# Patient Record
Sex: MALE | Race: BLACK OR AFRICAN AMERICAN | Employment: FULL TIME | URBAN - METROPOLITAN AREA
[De-identification: names, ages, dates, MRNs, and addresses within clinical notes are randomized per-mention and may not be internally consistent; named-entity substitution may affect disease eponyms.]

---

## 2017-01-27 ENCOUNTER — OFFICE VISIT (OUTPATIENT)
Dept: PRIMARY CARE CLINIC | Age: 57
End: 2017-01-27

## 2017-01-27 VITALS
WEIGHT: 263.8 LBS | TEMPERATURE: 97.9 F | BODY MASS INDEX: 31.28 KG/M2 | HEART RATE: 65 BPM | OXYGEN SATURATION: 98 % | SYSTOLIC BLOOD PRESSURE: 140 MMHG | DIASTOLIC BLOOD PRESSURE: 82 MMHG

## 2017-01-27 DIAGNOSIS — E78.00 PURE HYPERCHOLESTEROLEMIA: Primary | ICD-10-CM

## 2017-01-27 DIAGNOSIS — M1A.0790 CHRONIC IDIOPATHIC GOUT INVOLVING TOE, UNSPECIFIED LATERALITY: ICD-10-CM

## 2017-01-27 DIAGNOSIS — Z72.0 TOBACCO ABUSE: ICD-10-CM

## 2017-01-27 DIAGNOSIS — I10 ESSENTIAL HYPERTENSION: ICD-10-CM

## 2017-01-27 DIAGNOSIS — E78.00 PURE HYPERCHOLESTEROLEMIA: ICD-10-CM

## 2017-01-27 DIAGNOSIS — N52.01 ERECTILE DYSFUNCTION DUE TO ARTERIAL INSUFFICIENCY: ICD-10-CM

## 2017-01-27 LAB
ANION GAP SERPL CALCULATED.3IONS-SCNC: 17 MMOL/L (ref 3–16)
AST SERPL-CCNC: 27 U/L (ref 15–37)
BUN BLDV-MCNC: 18 MG/DL (ref 7–20)
CALCIUM SERPL-MCNC: 10.1 MG/DL (ref 8.3–10.6)
CHLORIDE BLD-SCNC: 100 MMOL/L (ref 99–110)
CHOLESTEROL, TOTAL: 242 MG/DL (ref 0–199)
CO2: 26 MMOL/L (ref 21–32)
CREAT SERPL-MCNC: 0.8 MG/DL (ref 0.9–1.3)
GFR AFRICAN AMERICAN: >60
GFR NON-AFRICAN AMERICAN: >60
GLUCOSE BLD-MCNC: 80 MG/DL (ref 70–99)
HDLC SERPL-MCNC: 95 MG/DL (ref 40–60)
LDL CHOLESTEROL CALCULATED: 126 MG/DL
POTASSIUM SERPL-SCNC: 4.5 MMOL/L (ref 3.5–5.1)
SODIUM BLD-SCNC: 143 MMOL/L (ref 136–145)
TRIGL SERPL-MCNC: 104 MG/DL (ref 0–150)
URIC ACID, SERUM: 6.9 MG/DL (ref 3.5–7.2)
VLDLC SERPL CALC-MCNC: 21 MG/DL

## 2017-01-27 PROCEDURE — 3017F COLORECTAL CA SCREEN DOC REV: CPT | Performed by: FAMILY MEDICINE

## 2017-01-27 PROCEDURE — 4004F PT TOBACCO SCREEN RCVD TLK: CPT | Performed by: FAMILY MEDICINE

## 2017-01-27 PROCEDURE — G8427 DOCREV CUR MEDS BY ELIG CLIN: HCPCS | Performed by: FAMILY MEDICINE

## 2017-01-27 PROCEDURE — G8419 CALC BMI OUT NRM PARAM NOF/U: HCPCS | Performed by: FAMILY MEDICINE

## 2017-01-27 PROCEDURE — 99214 OFFICE O/P EST MOD 30 MIN: CPT | Performed by: FAMILY MEDICINE

## 2017-01-27 PROCEDURE — G8484 FLU IMMUNIZE NO ADMIN: HCPCS | Performed by: FAMILY MEDICINE

## 2017-01-27 RX ORDER — BISOPROLOL FUMARATE AND HYDROCHLOROTHIAZIDE 10; 6.25 MG/1; MG/1
TABLET ORAL
Qty: 90 TABLET | Refills: 5 | Status: SHIPPED | OUTPATIENT
Start: 2017-01-27 | End: 2017-03-01 | Stop reason: SDUPTHER

## 2017-01-27 RX ORDER — TADALAFIL 2.5 MG/1
TABLET ORAL
Qty: 30 TABLET | Refills: 5 | Status: SHIPPED | OUTPATIENT
Start: 2017-01-27 | End: 2018-04-02 | Stop reason: SDUPTHER

## 2017-01-27 RX ORDER — CITALOPRAM 20 MG/1
20 TABLET ORAL
COMMUNITY
End: 2018-04-02

## 2017-01-27 RX ORDER — ALLOPURINOL 100 MG/1
TABLET ORAL
Qty: 180 TABLET | Refills: 1 | Status: SHIPPED | OUTPATIENT
Start: 2017-01-27 | End: 2018-04-02 | Stop reason: SDUPTHER

## 2017-01-27 RX ORDER — SIMVASTATIN 10 MG
10 TABLET ORAL
COMMUNITY
Start: 2015-06-16 | End: 2017-03-01 | Stop reason: CLARIF

## 2017-01-27 ASSESSMENT — ENCOUNTER SYMPTOMS
SHORTNESS OF BREATH: 0
BLURRED VISION: 0
CHOKING: 0
PHOTOPHOBIA: 0
SORE THROAT: 0
BACK PAIN: 0
ABDOMINAL PAIN: 0
ORTHOPNEA: 0
FACIAL SWELLING: 0
TROUBLE SWALLOWING: 0
APNEA: 0
COUGH: 0
ANAL BLEEDING: 0
COLOR CHANGE: 0
VOICE CHANGE: 0
EYE DISCHARGE: 0
NAUSEA: 0
EYE ITCHING: 0
RECTAL PAIN: 0
EYE REDNESS: 0
WHEEZING: 0
VOMITING: 0
BLOOD IN STOOL: 0
CONSTIPATION: 0
SINUS PRESSURE: 0
EYE PAIN: 0
CHEST TIGHTNESS: 0

## 2017-01-27 ASSESSMENT — PATIENT HEALTH QUESTIONNAIRE - PHQ9
1. LITTLE INTEREST OR PLEASURE IN DOING THINGS: 0
2. FEELING DOWN, DEPRESSED OR HOPELESS: 0
SUM OF ALL RESPONSES TO PHQ QUESTIONS 1-9: 0
SUM OF ALL RESPONSES TO PHQ9 QUESTIONS 1 & 2: 0

## 2017-02-01 ENCOUNTER — TELEPHONE (OUTPATIENT)
Dept: PRIMARY CARE CLINIC | Age: 57
End: 2017-02-01

## 2017-02-03 ENCOUNTER — OFFICE VISIT (OUTPATIENT)
Dept: FAMILY MEDICINE CLINIC | Age: 57
End: 2017-02-03

## 2017-02-03 VITALS
HEIGHT: 77 IN | WEIGHT: 264 LBS | SYSTOLIC BLOOD PRESSURE: 132 MMHG | BODY MASS INDEX: 31.17 KG/M2 | DIASTOLIC BLOOD PRESSURE: 86 MMHG | HEART RATE: 68 BPM

## 2017-02-03 DIAGNOSIS — M25.562 ACUTE PAIN OF BOTH KNEES: Primary | ICD-10-CM

## 2017-02-03 DIAGNOSIS — M25.561 ACUTE PAIN OF BOTH KNEES: Primary | ICD-10-CM

## 2017-02-03 PROCEDURE — 99213 OFFICE O/P EST LOW 20 MIN: CPT | Performed by: NURSE PRACTITIONER

## 2017-02-03 PROCEDURE — G8427 DOCREV CUR MEDS BY ELIG CLIN: HCPCS | Performed by: NURSE PRACTITIONER

## 2017-02-03 PROCEDURE — 4004F PT TOBACCO SCREEN RCVD TLK: CPT | Performed by: NURSE PRACTITIONER

## 2017-02-03 PROCEDURE — 3017F COLORECTAL CA SCREEN DOC REV: CPT | Performed by: NURSE PRACTITIONER

## 2017-02-03 PROCEDURE — G8484 FLU IMMUNIZE NO ADMIN: HCPCS | Performed by: NURSE PRACTITIONER

## 2017-02-03 PROCEDURE — G8419 CALC BMI OUT NRM PARAM NOF/U: HCPCS | Performed by: NURSE PRACTITIONER

## 2017-02-03 RX ORDER — TRAMADOL HYDROCHLORIDE 50 MG/1
50 TABLET ORAL 2 TIMES DAILY PRN
Qty: 20 TABLET | Refills: 0 | Status: SHIPPED | OUTPATIENT
Start: 2017-02-03 | End: 2017-02-13

## 2017-02-03 RX ORDER — PREDNISONE 10 MG/1
TABLET ORAL
Qty: 20 TABLET | Refills: 0 | Status: SHIPPED | OUTPATIENT
Start: 2017-02-03 | End: 2018-04-02

## 2017-02-03 ASSESSMENT — ENCOUNTER SYMPTOMS
CONSTIPATION: 0
DIARRHEA: 0
VOMITING: 0
BACK PAIN: 0
NAUSEA: 0
COUGH: 0
ABDOMINAL PAIN: 0
CHEST TIGHTNESS: 0
SHORTNESS OF BREATH: 0
WHEEZING: 0
VOICE CHANGE: 0

## 2017-02-13 ENCOUNTER — OFFICE VISIT (OUTPATIENT)
Dept: ORTHOPEDIC SURGERY | Age: 57
End: 2017-02-13

## 2017-02-13 ENCOUNTER — TELEPHONE (OUTPATIENT)
Dept: ORTHOPEDIC SURGERY | Age: 57
End: 2017-02-13

## 2017-02-13 VITALS
SYSTOLIC BLOOD PRESSURE: 132 MMHG | WEIGHT: 264 LBS | BODY MASS INDEX: 31.17 KG/M2 | HEART RATE: 78 BPM | TEMPERATURE: 97.8 F | DIASTOLIC BLOOD PRESSURE: 89 MMHG | HEIGHT: 77 IN

## 2017-02-13 DIAGNOSIS — S83.92XA SPRAIN OF LEFT KNEE, UNSPECIFIED LIGAMENT, INITIAL ENCOUNTER: ICD-10-CM

## 2017-02-13 DIAGNOSIS — S83.91XA SPRAIN OF RIGHT KNEE, UNSPECIFIED LIGAMENT, INITIAL ENCOUNTER: ICD-10-CM

## 2017-02-13 DIAGNOSIS — S80.01XA CONTUSION OF RIGHT KNEE, INITIAL ENCOUNTER: ICD-10-CM

## 2017-02-13 DIAGNOSIS — M17.0 PRIMARY OSTEOARTHRITIS OF BOTH KNEES: Primary | ICD-10-CM

## 2017-02-13 DIAGNOSIS — S80.02XA CONTUSION OF LEFT KNEE, INITIAL ENCOUNTER: Primary | ICD-10-CM

## 2017-02-13 PROCEDURE — G8419 CALC BMI OUT NRM PARAM NOF/U: HCPCS | Performed by: ORTHOPAEDIC SURGERY

## 2017-02-13 PROCEDURE — G8484 FLU IMMUNIZE NO ADMIN: HCPCS | Performed by: ORTHOPAEDIC SURGERY

## 2017-02-13 PROCEDURE — 3017F COLORECTAL CA SCREEN DOC REV: CPT | Performed by: ORTHOPAEDIC SURGERY

## 2017-02-13 PROCEDURE — 20610 DRAIN/INJ JOINT/BURSA W/O US: CPT | Performed by: ORTHOPAEDIC SURGERY

## 2017-02-13 PROCEDURE — 4004F PT TOBACCO SCREEN RCVD TLK: CPT | Performed by: ORTHOPAEDIC SURGERY

## 2017-02-13 PROCEDURE — G8427 DOCREV CUR MEDS BY ELIG CLIN: HCPCS | Performed by: ORTHOPAEDIC SURGERY

## 2017-02-13 PROCEDURE — 99214 OFFICE O/P EST MOD 30 MIN: CPT | Performed by: ORTHOPAEDIC SURGERY

## 2017-02-15 ENCOUNTER — TELEPHONE (OUTPATIENT)
Dept: ORTHOPEDIC SURGERY | Age: 57
End: 2017-02-15

## 2017-02-15 DIAGNOSIS — M25.561 RIGHT KNEE PAIN, UNSPECIFIED CHRONICITY: Primary | ICD-10-CM

## 2017-02-21 ENCOUNTER — HOSPITAL ENCOUNTER (OUTPATIENT)
Dept: OTHER | Age: 57
Discharge: HOME | End: 2017-02-28
Attending: ORTHOPAEDIC SURGERY | Admitting: ORTHOPAEDIC SURGERY

## 2017-02-21 ASSESSMENT — PAIN DESCRIPTION - LOCATION: LOCATION: KNEE

## 2017-02-21 ASSESSMENT — PAIN DESCRIPTION - ORIENTATION: ORIENTATION: RIGHT;LEFT

## 2017-02-21 ASSESSMENT — PAIN DESCRIPTION - PROGRESSION: CLINICAL_PROGRESSION: NOT CHANGED

## 2017-02-21 ASSESSMENT — PAIN DESCRIPTION - DESCRIPTORS: DESCRIPTORS: SHOOTING;SHARP;CONSTANT

## 2017-02-22 ENCOUNTER — HOSPITAL ENCOUNTER (OUTPATIENT)
Dept: PHYSICAL THERAPY | Age: 57
Discharge: HOME | End: 2017-02-22
Admitting: ORTHOPAEDIC SURGERY

## 2017-02-24 ENCOUNTER — HOSPITAL ENCOUNTER (OUTPATIENT)
Dept: MRI IMAGING | Age: 57
Discharge: HOME | End: 2017-02-24
Attending: ORTHOPAEDIC SURGERY | Admitting: ORTHOPAEDIC SURGERY

## 2017-02-24 DIAGNOSIS — M25.561 RIGHT KNEE PAIN, UNSPECIFIED CHRONICITY: ICD-10-CM

## 2017-02-24 DIAGNOSIS — M25.561 PAIN IN RIGHT KNEE: ICD-10-CM

## 2017-02-27 ENCOUNTER — TELEPHONE (OUTPATIENT)
Dept: ORTHOPEDIC SURGERY | Age: 57
End: 2017-02-27

## 2017-02-28 ENCOUNTER — HOSPITAL ENCOUNTER (OUTPATIENT)
Dept: PHYSICAL THERAPY | Age: 57
Discharge: HOME | End: 2017-02-28
Admitting: ORTHOPAEDIC SURGERY

## 2017-03-01 ENCOUNTER — OFFICE VISIT (OUTPATIENT)
Dept: PRIMARY CARE CLINIC | Age: 57
End: 2017-03-01

## 2017-03-01 VITALS
SYSTOLIC BLOOD PRESSURE: 119 MMHG | OXYGEN SATURATION: 99 % | HEART RATE: 69 BPM | BODY MASS INDEX: 31.07 KG/M2 | TEMPERATURE: 98.4 F | DIASTOLIC BLOOD PRESSURE: 78 MMHG | RESPIRATION RATE: 18 BRPM | WEIGHT: 262 LBS

## 2017-03-01 DIAGNOSIS — Z23 FLU VACCINE NEED: Primary | ICD-10-CM

## 2017-03-01 DIAGNOSIS — Z23 NEED FOR 23-POLYVALENT PNEUMOCOCCAL POLYSACCHARIDE VACCINE: ICD-10-CM

## 2017-03-01 DIAGNOSIS — I10 ESSENTIAL HYPERTENSION: ICD-10-CM

## 2017-03-01 DIAGNOSIS — Z23 NEED FOR DIPHTHERIA-TETANUS-PERTUSSIS (TDAP) VACCINE: ICD-10-CM

## 2017-03-01 PROCEDURE — 90732 PPSV23 VACC 2 YRS+ SUBQ/IM: CPT | Performed by: INTERNAL MEDICINE

## 2017-03-01 PROCEDURE — 4004F PT TOBACCO SCREEN RCVD TLK: CPT | Performed by: INTERNAL MEDICINE

## 2017-03-01 PROCEDURE — G8484 FLU IMMUNIZE NO ADMIN: HCPCS | Performed by: INTERNAL MEDICINE

## 2017-03-01 PROCEDURE — G8427 DOCREV CUR MEDS BY ELIG CLIN: HCPCS | Performed by: INTERNAL MEDICINE

## 2017-03-01 PROCEDURE — G8417 CALC BMI ABV UP PARAM F/U: HCPCS | Performed by: INTERNAL MEDICINE

## 2017-03-01 PROCEDURE — 3017F COLORECTAL CA SCREEN DOC REV: CPT | Performed by: INTERNAL MEDICINE

## 2017-03-01 PROCEDURE — 99213 OFFICE O/P EST LOW 20 MIN: CPT | Performed by: INTERNAL MEDICINE

## 2017-03-01 PROCEDURE — G0009 ADMIN PNEUMOCOCCAL VACCINE: HCPCS | Performed by: INTERNAL MEDICINE

## 2017-03-01 RX ORDER — BISOPROLOL FUMARATE AND HYDROCHLOROTHIAZIDE 10; 6.25 MG/1; MG/1
TABLET ORAL
Qty: 180 TABLET | Refills: 2 | Status: SHIPPED | OUTPATIENT
Start: 2017-03-01 | End: 2018-01-17 | Stop reason: SDUPTHER

## 2017-03-01 RX ORDER — EZETIMIBE 10 MG/1
10 TABLET ORAL DAILY
Qty: 90 TABLET | Refills: 0 | Status: SHIPPED | OUTPATIENT
Start: 2017-03-01 | End: 2017-06-07 | Stop reason: SDUPTHER

## 2017-03-01 RX ORDER — KETOPROFEN 50 MG/1
50 CAPSULE ORAL 2 TIMES DAILY
COMMUNITY
Start: 2017-02-14 | End: 2018-04-02 | Stop reason: SDUPTHER

## 2017-03-01 ASSESSMENT — ENCOUNTER SYMPTOMS
ORTHOPNEA: 0
EYE REDNESS: 0
GASTROINTESTINAL NEGATIVE: 1
EYE ITCHING: 0
EYE DISCHARGE: 0
RESPIRATORY NEGATIVE: 1
EYE PAIN: 0
PHOTOPHOBIA: 0
BLURRED VISION: 0
SHORTNESS OF BREATH: 0

## 2017-03-02 ENCOUNTER — OFFICE VISIT (OUTPATIENT)
Dept: ORTHOPEDIC SURGERY | Age: 57
End: 2017-03-02

## 2017-03-02 ENCOUNTER — TELEPHONE (OUTPATIENT)
Dept: ORTHOPEDIC SURGERY | Age: 57
End: 2017-03-02

## 2017-03-02 ENCOUNTER — HOSPITAL ENCOUNTER (OUTPATIENT)
Dept: PHYSICAL THERAPY | Age: 57
Discharge: HOME | End: 2017-03-03
Admitting: ORTHOPAEDIC SURGERY

## 2017-03-02 VITALS
DIASTOLIC BLOOD PRESSURE: 82 MMHG | BODY MASS INDEX: 30.94 KG/M2 | TEMPERATURE: 97 F | RESPIRATION RATE: 16 BRPM | HEIGHT: 77 IN | HEART RATE: 75 BPM | SYSTOLIC BLOOD PRESSURE: 128 MMHG | WEIGHT: 262 LBS

## 2017-03-02 DIAGNOSIS — M25.562 PAIN IN BOTH KNEES, UNSPECIFIED CHRONICITY: Primary | ICD-10-CM

## 2017-03-02 DIAGNOSIS — M17.0 PRIMARY OSTEOARTHRITIS OF BOTH KNEES: ICD-10-CM

## 2017-03-02 DIAGNOSIS — M25.561 PAIN IN BOTH KNEES, UNSPECIFIED CHRONICITY: Primary | ICD-10-CM

## 2017-03-02 PROCEDURE — 4004F PT TOBACCO SCREEN RCVD TLK: CPT | Performed by: ORTHOPAEDIC SURGERY

## 2017-03-02 PROCEDURE — G8484 FLU IMMUNIZE NO ADMIN: HCPCS | Performed by: ORTHOPAEDIC SURGERY

## 2017-03-02 PROCEDURE — G8427 DOCREV CUR MEDS BY ELIG CLIN: HCPCS | Performed by: ORTHOPAEDIC SURGERY

## 2017-03-02 PROCEDURE — 99213 OFFICE O/P EST LOW 20 MIN: CPT | Performed by: ORTHOPAEDIC SURGERY

## 2017-03-02 PROCEDURE — G8417 CALC BMI ABV UP PARAM F/U: HCPCS | Performed by: ORTHOPAEDIC SURGERY

## 2017-03-02 PROCEDURE — L1812 KO ELASTIC W/JOINTS PRE OTS: HCPCS | Performed by: ORTHOPAEDIC SURGERY

## 2017-03-02 PROCEDURE — 73562 X-RAY EXAM OF KNEE 3: CPT | Performed by: ORTHOPAEDIC SURGERY

## 2017-03-02 PROCEDURE — 3017F COLORECTAL CA SCREEN DOC REV: CPT | Performed by: ORTHOPAEDIC SURGERY

## 2017-03-06 ENCOUNTER — HOSPITAL ENCOUNTER (OUTPATIENT)
Dept: MRI IMAGING | Age: 57
Discharge: HOME | End: 2017-03-06
Attending: ORTHOPAEDIC SURGERY | Admitting: ORTHOPAEDIC SURGERY

## 2017-03-06 DIAGNOSIS — M25.561 PAIN IN RIGHT KNEE: ICD-10-CM

## 2017-03-06 DIAGNOSIS — M25.562 PAIN IN BOTH KNEES, UNSPECIFIED CHRONICITY: ICD-10-CM

## 2017-03-06 DIAGNOSIS — M25.561 PAIN IN BOTH KNEES, UNSPECIFIED CHRONICITY: ICD-10-CM

## 2017-03-08 ENCOUNTER — TELEPHONE (OUTPATIENT)
Dept: ORTHOPEDIC SURGERY | Age: 57
End: 2017-03-08

## 2017-03-09 ENCOUNTER — HOSPITAL ENCOUNTER (OUTPATIENT)
Dept: PHYSICAL THERAPY | Age: 57
Discharge: HOME | End: 2017-03-10
Admitting: ORTHOPAEDIC SURGERY

## 2017-03-13 ENCOUNTER — OFFICE VISIT (OUTPATIENT)
Dept: ORTHOPEDIC SURGERY | Age: 57
End: 2017-03-13

## 2017-03-13 VITALS
BODY MASS INDEX: 30.94 KG/M2 | HEART RATE: 66 BPM | RESPIRATION RATE: 16 BRPM | TEMPERATURE: 97.6 F | DIASTOLIC BLOOD PRESSURE: 80 MMHG | HEIGHT: 77 IN | WEIGHT: 262 LBS | SYSTOLIC BLOOD PRESSURE: 126 MMHG

## 2017-03-13 DIAGNOSIS — M17.0 PRIMARY OSTEOARTHRITIS OF BOTH KNEES: Primary | ICD-10-CM

## 2017-03-13 PROCEDURE — G8417 CALC BMI ABV UP PARAM F/U: HCPCS | Performed by: PHYSICIAN ASSISTANT

## 2017-03-13 PROCEDURE — 20610 DRAIN/INJ JOINT/BURSA W/O US: CPT | Performed by: PHYSICIAN ASSISTANT

## 2017-03-13 PROCEDURE — 4004F PT TOBACCO SCREEN RCVD TLK: CPT | Performed by: PHYSICIAN ASSISTANT

## 2017-03-13 PROCEDURE — 99213 OFFICE O/P EST LOW 20 MIN: CPT | Performed by: PHYSICIAN ASSISTANT

## 2017-03-13 PROCEDURE — G8484 FLU IMMUNIZE NO ADMIN: HCPCS | Performed by: PHYSICIAN ASSISTANT

## 2017-03-13 PROCEDURE — G8427 DOCREV CUR MEDS BY ELIG CLIN: HCPCS | Performed by: PHYSICIAN ASSISTANT

## 2017-03-13 PROCEDURE — 3017F COLORECTAL CA SCREEN DOC REV: CPT | Performed by: PHYSICIAN ASSISTANT

## 2017-03-16 ENCOUNTER — HOSPITAL ENCOUNTER (OUTPATIENT)
Dept: PHYSICAL THERAPY | Age: 57
Discharge: HOME | End: 2017-03-17
Admitting: ORTHOPAEDIC SURGERY

## 2017-03-21 ENCOUNTER — HOSPITAL ENCOUNTER (OUTPATIENT)
Dept: PHYSICAL THERAPY | Age: 57
Discharge: HOME | End: 2017-03-22
Admitting: ORTHOPAEDIC SURGERY

## 2017-03-27 ENCOUNTER — OFFICE VISIT (OUTPATIENT)
Dept: ORTHOPEDIC SURGERY | Age: 57
End: 2017-03-27

## 2017-03-27 VITALS
DIASTOLIC BLOOD PRESSURE: 88 MMHG | WEIGHT: 261.91 LBS | BODY MASS INDEX: 30.92 KG/M2 | SYSTOLIC BLOOD PRESSURE: 139 MMHG | TEMPERATURE: 98.2 F | HEART RATE: 69 BPM | HEIGHT: 77 IN

## 2017-03-27 DIAGNOSIS — M17.0 PRIMARY OSTEOARTHRITIS OF BOTH KNEES: Primary | ICD-10-CM

## 2017-03-27 PROCEDURE — G8427 DOCREV CUR MEDS BY ELIG CLIN: HCPCS | Performed by: PHYSICIAN ASSISTANT

## 2017-03-27 PROCEDURE — 4004F PT TOBACCO SCREEN RCVD TLK: CPT | Performed by: PHYSICIAN ASSISTANT

## 2017-03-27 PROCEDURE — G8484 FLU IMMUNIZE NO ADMIN: HCPCS | Performed by: PHYSICIAN ASSISTANT

## 2017-03-27 PROCEDURE — 3017F COLORECTAL CA SCREEN DOC REV: CPT | Performed by: PHYSICIAN ASSISTANT

## 2017-03-27 PROCEDURE — 99212 OFFICE O/P EST SF 10 MIN: CPT | Performed by: PHYSICIAN ASSISTANT

## 2017-03-27 PROCEDURE — G8417 CALC BMI ABV UP PARAM F/U: HCPCS | Performed by: PHYSICIAN ASSISTANT

## 2017-06-10 RX ORDER — EZETIMIBE 10 MG/1
TABLET ORAL
Qty: 90 TABLET | Refills: 0 | Status: SHIPPED | OUTPATIENT
Start: 2017-06-10 | End: 2018-04-02 | Stop reason: SDUPTHER

## 2018-01-17 DIAGNOSIS — I10 ESSENTIAL HYPERTENSION: ICD-10-CM

## 2018-01-17 RX ORDER — BISOPROLOL FUMARATE AND HYDROCHLOROTHIAZIDE 10; 6.25 MG/1; MG/1
TABLET ORAL
Qty: 180 TABLET | Refills: 0 | Status: SHIPPED | OUTPATIENT
Start: 2018-01-17 | End: 2018-04-02 | Stop reason: SDUPTHER

## 2018-04-02 ENCOUNTER — OFFICE VISIT (OUTPATIENT)
Dept: PRIMARY CARE CLINIC | Age: 58
End: 2018-04-02

## 2018-04-02 VITALS
BODY MASS INDEX: 34.24 KG/M2 | OXYGEN SATURATION: 99 % | WEIGHT: 290 LBS | DIASTOLIC BLOOD PRESSURE: 91 MMHG | HEIGHT: 77 IN | SYSTOLIC BLOOD PRESSURE: 139 MMHG | HEART RATE: 61 BPM

## 2018-04-02 DIAGNOSIS — F41.9 ANXIETY: ICD-10-CM

## 2018-04-02 DIAGNOSIS — Z12.5 PROSTATE CANCER SCREENING: ICD-10-CM

## 2018-04-02 DIAGNOSIS — M1A.0790 CHRONIC IDIOPATHIC GOUT INVOLVING TOE, UNSPECIFIED LATERALITY: ICD-10-CM

## 2018-04-02 DIAGNOSIS — Z23 NEED FOR PROPHYLACTIC VACCINATION AND INOCULATION AGAINST VARICELLA: ICD-10-CM

## 2018-04-02 DIAGNOSIS — N52.01 ERECTILE DYSFUNCTION DUE TO ARTERIAL INSUFFICIENCY: ICD-10-CM

## 2018-04-02 DIAGNOSIS — Z23 NEED FOR PROPHYLACTIC VACCINATION AGAINST DIPHTHERIA-TETANUS-PERTUSSIS (DTP): ICD-10-CM

## 2018-04-02 DIAGNOSIS — Z12.11 SCREEN FOR COLON CANCER: ICD-10-CM

## 2018-04-02 DIAGNOSIS — I10 ESSENTIAL HYPERTENSION: ICD-10-CM

## 2018-04-02 DIAGNOSIS — Z79.899 LONG-TERM USE OF HIGH-RISK MEDICATION: ICD-10-CM

## 2018-04-02 DIAGNOSIS — E78.1 PURE HYPERGLYCERIDEMIA: ICD-10-CM

## 2018-04-02 DIAGNOSIS — M17.0 PRIMARY OSTEOARTHRITIS OF BOTH KNEES: ICD-10-CM

## 2018-04-02 DIAGNOSIS — Z82.49 FAMILY HISTORY OF EARLY CAD: ICD-10-CM

## 2018-04-02 DIAGNOSIS — I10 ESSENTIAL HYPERTENSION: Primary | ICD-10-CM

## 2018-04-02 LAB
A/G RATIO: 1.5 (ref 1.1–2.2)
ALBUMIN SERPL-MCNC: 4.4 G/DL (ref 3.4–5)
ALP BLD-CCNC: 56 U/L (ref 40–129)
ALT SERPL-CCNC: 45 U/L (ref 10–40)
AMPHETAMINE SCREEN, URINE: NORMAL
ANION GAP SERPL CALCULATED.3IONS-SCNC: 15 MMOL/L (ref 3–16)
AST SERPL-CCNC: 40 U/L (ref 15–37)
BARBITURATE SCREEN URINE: NORMAL
BENZODIAZEPINE SCREEN, URINE: NORMAL
BILIRUB SERPL-MCNC: 0.4 MG/DL (ref 0–1)
BUN BLDV-MCNC: 15 MG/DL (ref 7–20)
CALCIUM SERPL-MCNC: 9.5 MG/DL (ref 8.3–10.6)
CANNABINOID SCREEN URINE: NORMAL
CHLORIDE BLD-SCNC: 100 MMOL/L (ref 99–110)
CO2: 26 MMOL/L (ref 21–32)
COCAINE METABOLITE SCREEN URINE: NORMAL
CREAT SERPL-MCNC: 0.9 MG/DL (ref 0.9–1.3)
GFR AFRICAN AMERICAN: >60
GFR NON-AFRICAN AMERICAN: >60
GLOBULIN: 2.9 G/DL
GLUCOSE BLD-MCNC: 95 MG/DL (ref 70–99)
HCT VFR BLD CALC: 48.2 % (ref 40.5–52.5)
HEMOGLOBIN: 15.8 G/DL (ref 13.5–17.5)
Lab: NORMAL
MCH RBC QN AUTO: 29.9 PG (ref 26–34)
MCHC RBC AUTO-ENTMCNC: 32.8 G/DL (ref 31–36)
MCV RBC AUTO: 91.2 FL (ref 80–100)
METHADONE SCREEN, URINE: NORMAL
OPIATE SCREEN URINE: NORMAL
OXYCODONE URINE: NORMAL
PDW BLD-RTO: 16.5 % (ref 12.4–15.4)
PH UA: 5
PHENCYCLIDINE SCREEN URINE: NORMAL
PLATELET # BLD: 202 K/UL (ref 135–450)
PMV BLD AUTO: 8.7 FL (ref 5–10.5)
POTASSIUM SERPL-SCNC: 4.5 MMOL/L (ref 3.5–5.1)
PROPOXYPHENE SCREEN: NORMAL
PROSTATE SPECIFIC ANTIGEN: 1.56 NG/ML (ref 0–4)
RBC # BLD: 5.28 M/UL (ref 4.2–5.9)
SODIUM BLD-SCNC: 141 MMOL/L (ref 136–145)
TOTAL CK: 228 U/L (ref 39–308)
TOTAL PROTEIN: 7.3 G/DL (ref 6.4–8.2)
URIC ACID, SERUM: 10.7 MG/DL (ref 3.5–7.2)
WBC # BLD: 4.7 K/UL (ref 4–11)

## 2018-04-02 PROCEDURE — G8417 CALC BMI ABV UP PARAM F/U: HCPCS | Performed by: FAMILY MEDICINE

## 2018-04-02 PROCEDURE — 3017F COLORECTAL CA SCREEN DOC REV: CPT | Performed by: FAMILY MEDICINE

## 2018-04-02 PROCEDURE — 4004F PT TOBACCO SCREEN RCVD TLK: CPT | Performed by: FAMILY MEDICINE

## 2018-04-02 PROCEDURE — 99214 OFFICE O/P EST MOD 30 MIN: CPT | Performed by: FAMILY MEDICINE

## 2018-04-02 PROCEDURE — G8427 DOCREV CUR MEDS BY ELIG CLIN: HCPCS | Performed by: FAMILY MEDICINE

## 2018-04-02 RX ORDER — BISOPROLOL FUMARATE AND HYDROCHLOROTHIAZIDE 10; 6.25 MG/1; MG/1
TABLET ORAL
Qty: 180 TABLET | Refills: 0 | Status: SHIPPED | OUTPATIENT
Start: 2018-04-02 | End: 2018-09-08 | Stop reason: SDUPTHER

## 2018-04-02 RX ORDER — KETOPROFEN 50 MG/1
2500 CAPSULE ORAL 2 TIMES DAILY
Qty: 60 CAPSULE | Refills: 2 | Status: SHIPPED | OUTPATIENT
Start: 2018-04-02 | End: 2019-06-28 | Stop reason: SDUPTHER

## 2018-04-02 RX ORDER — LORAZEPAM 1 MG/1
TABLET ORAL
Qty: 30 TABLET | Refills: 0 | Status: SHIPPED | OUTPATIENT
Start: 2018-04-02 | End: 2018-04-02

## 2018-04-02 RX ORDER — TADALAFIL 2.5 MG/1
TABLET ORAL
Qty: 30 TABLET | Refills: 5 | Status: SHIPPED | OUTPATIENT
Start: 2018-04-02 | End: 2019-09-27 | Stop reason: SDUPTHER

## 2018-04-02 RX ORDER — EZETIMIBE 10 MG/1
TABLET ORAL
Qty: 90 TABLET | Refills: 0 | Status: SHIPPED | OUTPATIENT
Start: 2018-04-02 | End: 2019-06-28 | Stop reason: SDUPTHER

## 2018-04-02 RX ORDER — TADALAFIL 2.5 MG/1
TABLET ORAL
Qty: 30 TABLET | Refills: 5 | Status: SHIPPED | OUTPATIENT
Start: 2018-04-02 | End: 2018-04-02 | Stop reason: SDUPTHER

## 2018-04-02 RX ORDER — ALLOPURINOL 100 MG/1
TABLET ORAL
Qty: 180 TABLET | Refills: 1 | Status: SHIPPED | OUTPATIENT
Start: 2018-04-02 | End: 2019-06-28 | Stop reason: SDUPTHER

## 2018-04-02 ASSESSMENT — ENCOUNTER SYMPTOMS
BLURRED VISION: 0
NAUSEA: 0
CONSTIPATION: 0
EYE ITCHING: 0
CHOKING: 0
TROUBLE SWALLOWING: 0
PHOTOPHOBIA: 0
SORE THROAT: 0
VOMITING: 0
EYE DISCHARGE: 0
ANAL BLEEDING: 0
SHORTNESS OF BREATH: 0
ABDOMINAL PAIN: 0
APNEA: 0
COUGH: 0
WHEEZING: 0
COLOR CHANGE: 0
SINUS PRESSURE: 0
EYE REDNESS: 0
VOICE CHANGE: 0
BACK PAIN: 0
CHEST TIGHTNESS: 0
BLOOD IN STOOL: 0
FACIAL SWELLING: 0
EYE PAIN: 0
RECTAL PAIN: 0

## 2018-04-02 ASSESSMENT — PATIENT HEALTH QUESTIONNAIRE - PHQ9
2. FEELING DOWN, DEPRESSED OR HOPELESS: 1
SUM OF ALL RESPONSES TO PHQ9 QUESTIONS 1 & 2: 1
SUM OF ALL RESPONSES TO PHQ QUESTIONS 1-9: 1
1. LITTLE INTEREST OR PLEASURE IN DOING THINGS: 0

## 2018-04-03 DIAGNOSIS — R74.8 ELEVATED LIVER ENZYMES: Primary | ICD-10-CM

## 2018-04-03 DIAGNOSIS — E78.00 PURE HYPERCHOLESTEROLEMIA: ICD-10-CM

## 2018-04-03 DIAGNOSIS — R79.89 ABNORMAL LIVER FUNCTION TEST: ICD-10-CM

## 2018-04-03 DIAGNOSIS — E78.00 PURE HYPERCHOLESTEROLEMIA: Primary | ICD-10-CM

## 2018-04-03 LAB
CHOLESTEROL, TOTAL: 264 MG/DL (ref 0–199)
HDLC SERPL-MCNC: 68 MG/DL (ref 40–60)
LDL CHOLESTEROL CALCULATED: 161 MG/DL
TRIGL SERPL-MCNC: 174 MG/DL (ref 0–150)
VLDLC SERPL CALC-MCNC: 35 MG/DL

## 2018-04-06 DIAGNOSIS — E78.00 PURE HYPERCHOLESTEROLEMIA: Primary | ICD-10-CM

## 2018-04-06 RX ORDER — ATORVASTATIN CALCIUM 20 MG/1
20 TABLET, FILM COATED ORAL DAILY
Qty: 30 TABLET | Refills: 3 | Status: SHIPPED | OUTPATIENT
Start: 2018-04-06 | End: 2018-04-19 | Stop reason: SDUPTHER

## 2018-04-19 DIAGNOSIS — E78.00 PURE HYPERCHOLESTEROLEMIA: ICD-10-CM

## 2018-04-21 RX ORDER — ATORVASTATIN CALCIUM 20 MG/1
20 TABLET, FILM COATED ORAL DAILY
Qty: 90 TABLET | Refills: 1 | Status: SHIPPED | OUTPATIENT
Start: 2018-04-21 | End: 2018-12-04 | Stop reason: SDUPTHER

## 2018-06-21 ENCOUNTER — OFFICE VISIT (OUTPATIENT)
Dept: ORTHOPEDIC SURGERY | Age: 58
End: 2018-06-21

## 2018-06-21 VITALS
BODY MASS INDEX: 34.24 KG/M2 | WEIGHT: 290 LBS | TEMPERATURE: 97.7 F | DIASTOLIC BLOOD PRESSURE: 95 MMHG | HEART RATE: 65 BPM | SYSTOLIC BLOOD PRESSURE: 142 MMHG | HEIGHT: 77 IN

## 2018-06-21 DIAGNOSIS — S80.01XA CONTUSION OF RIGHT KNEE, INITIAL ENCOUNTER: Primary | ICD-10-CM

## 2018-06-21 DIAGNOSIS — S80.02XA CONTUSION OF LEFT KNEE, INITIAL ENCOUNTER: ICD-10-CM

## 2018-06-21 PROCEDURE — 99214 OFFICE O/P EST MOD 30 MIN: CPT | Performed by: PHYSICIAN ASSISTANT

## 2018-06-21 RX ORDER — KETOPROFEN 75 MG/1
75 CAPSULE ORAL 4 TIMES DAILY PRN
Qty: 120 CAPSULE | Refills: 0 | Status: SHIPPED
Start: 2018-06-21 | End: 2020-06-26 | Stop reason: ALTCHOICE

## 2018-06-22 ENCOUNTER — TELEPHONE (OUTPATIENT)
Dept: ORTHOPEDIC SURGERY | Age: 58
End: 2018-06-22

## 2018-06-23 PROBLEM — S80.01XA CONTUSION OF RIGHT KNEE: Status: ACTIVE | Noted: 2018-06-23

## 2018-06-23 PROBLEM — S80.02XA CONTUSION OF LEFT KNEE: Status: ACTIVE | Noted: 2018-06-23

## 2018-06-26 ENCOUNTER — TELEPHONE (OUTPATIENT)
Dept: ORTHOPEDIC SURGERY | Age: 58
End: 2018-06-26

## 2018-07-02 ENCOUNTER — OFFICE VISIT (OUTPATIENT)
Dept: ORTHOPEDIC SURGERY | Age: 58
End: 2018-07-02

## 2018-07-02 VITALS
HEIGHT: 77 IN | DIASTOLIC BLOOD PRESSURE: 92 MMHG | TEMPERATURE: 96.6 F | BODY MASS INDEX: 34.24 KG/M2 | HEART RATE: 88 BPM | SYSTOLIC BLOOD PRESSURE: 143 MMHG | WEIGHT: 290 LBS | RESPIRATION RATE: 16 BRPM

## 2018-07-02 DIAGNOSIS — S80.02XA CONTUSION OF LEFT KNEE, INITIAL ENCOUNTER: ICD-10-CM

## 2018-07-02 DIAGNOSIS — S80.01XA CONTUSION OF RIGHT KNEE, INITIAL ENCOUNTER: Primary | ICD-10-CM

## 2018-07-02 PROCEDURE — 99212 OFFICE O/P EST SF 10 MIN: CPT | Performed by: PHYSICIAN ASSISTANT

## 2018-07-02 PROCEDURE — L1812 KO ELASTIC W/JOINTS PRE OTS: HCPCS | Performed by: PHYSICIAN ASSISTANT

## 2018-07-02 PROCEDURE — 20610 DRAIN/INJ JOINT/BURSA W/O US: CPT | Performed by: PHYSICIAN ASSISTANT

## 2018-07-03 NOTE — PROGRESS NOTES
negative. Patellar Compression testing does produce pain. Extensor Mechanism is  intact. X Rays: not performed in the office today:   Diagnosis:        ICD-10-CM ICD-9-CM    1. Contusion of right knee, initial encounter S80. 01XA 924.11 DJO Hinged Patella Knee Stabilizer      OH ARTHROCENTESIS ASPIR&/INJ MAJOR JT/BURSA W/O US      OH ARTHROCENTESIS ASPIR&/INJ MAJOR JT/BURSA W/O US      OH TRIAMCINOLONE ACETONIDE INJ   2. Contusion of left knee, initial encounter S80. 02XA 924.11 DJO Hinged Patella Knee Stabilizer      OH ARTHROCENTESIS ASPIR&/INJ MAJOR JT/BURSA W/O US      OH ARTHROCENTESIS ASPIR&/INJ MAJOR JT/BURSA W/O US      OH TRIAMCINOLONE ACETONIDE INJ        Assessment/ Plan:      The natural history of the patient's diagnosis as well as the treatment options were discussed in full and questions were answered. Risks and benefits of the treatment options also reviewed in detail. He has bilateral knee pain due to a recent work comp injury to both knees. He has contusions, MCL sprain left knee and bilateral knee arthritis which was aggravated by his work injury. Authorization has been granted for bilateral knee cortisone injections as well as economy hinged braces. Risk and benefits of corticosteroid intra-articular injection was discussed today. All questions were answered to his satisfaction. He verbally consented to proceed with intra-articular injection today. Cortisone Injection                                                       PROCEDURE NOTE:     Pre op Diagnosis:  bilateral knee pain     Post op Diagnosis: Same  With the patient's permission, his bilateral knee was prepped  in standard sterile fashion with  Alcohol and 2 cc of 0.25% Marcaine and 1 cc of Kenalog 40 mg was injected into the bilateral lateral compartment  without difficulty. The patient tolerated this well without difficulty. A band-aid was applied.  The patient was advised to ice the knee for 15-20 minutes

## 2018-07-17 ENCOUNTER — OFFICE VISIT (OUTPATIENT)
Dept: ORTHOPEDIC SURGERY | Age: 58
End: 2018-07-17

## 2018-07-17 VITALS
HEART RATE: 66 BPM | DIASTOLIC BLOOD PRESSURE: 89 MMHG | WEIGHT: 290 LBS | BODY MASS INDEX: 34.24 KG/M2 | HEIGHT: 77 IN | SYSTOLIC BLOOD PRESSURE: 125 MMHG

## 2018-07-17 DIAGNOSIS — S39.012A STRAIN OF LUMBAR REGION, INITIAL ENCOUNTER: ICD-10-CM

## 2018-07-17 DIAGNOSIS — M51.36 DDD (DEGENERATIVE DISC DISEASE), LUMBAR: Primary | ICD-10-CM

## 2018-07-17 PROCEDURE — 99203 OFFICE O/P NEW LOW 30 MIN: CPT | Performed by: NURSE PRACTITIONER

## 2018-09-08 DIAGNOSIS — I10 ESSENTIAL HYPERTENSION: ICD-10-CM

## 2018-09-11 RX ORDER — BISOPROLOL FUMARATE AND HYDROCHLOROTHIAZIDE 10; 6.25 MG/1; MG/1
TABLET ORAL
Qty: 180 TABLET | Refills: 0 | Status: SHIPPED | OUTPATIENT
Start: 2018-09-11 | End: 2019-02-19 | Stop reason: SDUPTHER

## 2018-12-04 DIAGNOSIS — E78.00 PURE HYPERCHOLESTEROLEMIA: ICD-10-CM

## 2018-12-05 RX ORDER — ATORVASTATIN CALCIUM 20 MG/1
TABLET, FILM COATED ORAL
Qty: 90 TABLET | Refills: 1 | Status: SHIPPED | OUTPATIENT
Start: 2018-12-05 | End: 2019-06-28 | Stop reason: SDUPTHER

## 2019-02-19 DIAGNOSIS — I10 ESSENTIAL HYPERTENSION: ICD-10-CM

## 2019-02-20 RX ORDER — BISOPROLOL FUMARATE AND HYDROCHLOROTHIAZIDE 10; 6.25 MG/1; MG/1
TABLET ORAL
Qty: 180 TABLET | Refills: 0 | Status: SHIPPED | OUTPATIENT
Start: 2019-02-20 | End: 2019-06-25 | Stop reason: SDUPTHER

## 2019-06-25 DIAGNOSIS — I10 ESSENTIAL HYPERTENSION: ICD-10-CM

## 2019-06-25 RX ORDER — BISOPROLOL FUMARATE AND HYDROCHLOROTHIAZIDE 10; 6.25 MG/1; MG/1
TABLET ORAL
Qty: 180 TABLET | Refills: 0 | Status: SHIPPED | OUTPATIENT
Start: 2019-06-25 | End: 2019-06-28 | Stop reason: SDUPTHER

## 2019-06-28 ENCOUNTER — OFFICE VISIT (OUTPATIENT)
Dept: PRIMARY CARE CLINIC | Age: 59
End: 2019-06-28

## 2019-06-28 VITALS
BODY MASS INDEX: 34.38 KG/M2 | HEART RATE: 61 BPM | OXYGEN SATURATION: 100 % | DIASTOLIC BLOOD PRESSURE: 86 MMHG | SYSTOLIC BLOOD PRESSURE: 137 MMHG | HEIGHT: 77 IN | WEIGHT: 291.2 LBS

## 2019-06-28 DIAGNOSIS — M1A.0790 CHRONIC IDIOPATHIC GOUT INVOLVING TOE, UNSPECIFIED LATERALITY: ICD-10-CM

## 2019-06-28 DIAGNOSIS — Z79.899 HIGH RISK MEDICATION USE: ICD-10-CM

## 2019-06-28 DIAGNOSIS — F41.9 ANXIETY: Primary | ICD-10-CM

## 2019-06-28 DIAGNOSIS — E78.1 PURE HYPERGLYCERIDEMIA: ICD-10-CM

## 2019-06-28 DIAGNOSIS — E78.00 PURE HYPERCHOLESTEROLEMIA: ICD-10-CM

## 2019-06-28 DIAGNOSIS — M17.0 PRIMARY OSTEOARTHRITIS OF BOTH KNEES: ICD-10-CM

## 2019-06-28 DIAGNOSIS — I10 ESSENTIAL HYPERTENSION: ICD-10-CM

## 2019-06-28 LAB
AMPHETAMINE SCREEN, URINE: NORMAL
BARBITURATE SCREEN URINE: NORMAL
BENZODIAZEPINE SCREEN, URINE: NORMAL
CANNABINOID SCREEN URINE: NORMAL
CHOLESTEROL, TOTAL: 170 MG/DL (ref 0–199)
COCAINE METABOLITE SCREEN URINE: NORMAL
HDLC SERPL-MCNC: 71 MG/DL (ref 40–60)
LDL CHOLESTEROL CALCULATED: 76 MG/DL
Lab: NORMAL
METHADONE SCREEN, URINE: NORMAL
OPIATE SCREEN URINE: NORMAL
OXYCODONE URINE: NORMAL
PH UA: 5.5
PHENCYCLIDINE SCREEN URINE: NORMAL
PROPOXYPHENE SCREEN: NORMAL
TRIGL SERPL-MCNC: 113 MG/DL (ref 0–150)
URIC ACID, SERUM: 10 MG/DL (ref 3.5–7.2)
VLDLC SERPL CALC-MCNC: 23 MG/DL

## 2019-06-28 PROCEDURE — 99214 OFFICE O/P EST MOD 30 MIN: CPT | Performed by: FAMILY MEDICINE

## 2019-06-28 RX ORDER — KETOPROFEN 50 MG/1
2500 CAPSULE ORAL 2 TIMES DAILY
Qty: 180 CAPSULE | Refills: 1 | Status: SHIPPED | OUTPATIENT
Start: 2019-06-28 | End: 2020-06-26 | Stop reason: ALTCHOICE

## 2019-06-28 RX ORDER — ALLOPURINOL 100 MG/1
TABLET ORAL
Qty: 180 TABLET | Refills: 1 | Status: SHIPPED | OUTPATIENT
Start: 2019-06-28 | End: 2019-06-29 | Stop reason: DRUGHIGH

## 2019-06-28 RX ORDER — BISOPROLOL FUMARATE AND HYDROCHLOROTHIAZIDE 10; 6.25 MG/1; MG/1
TABLET ORAL
Qty: 180 TABLET | Refills: 1 | Status: SHIPPED | OUTPATIENT
Start: 2019-06-28 | End: 2020-06-26 | Stop reason: SDUPTHER

## 2019-06-28 RX ORDER — LORAZEPAM 1 MG/1
1 TABLET ORAL
COMMUNITY
End: 2019-06-28 | Stop reason: SDUPTHER

## 2019-06-28 RX ORDER — EZETIMIBE 10 MG/1
TABLET ORAL
Qty: 90 TABLET | Refills: 1 | Status: SHIPPED | OUTPATIENT
Start: 2019-06-28 | End: 2020-06-26 | Stop reason: SDUPTHER

## 2019-06-28 RX ORDER — LORAZEPAM 1 MG/1
TABLET ORAL
Qty: 15 TABLET | Refills: 2 | Status: SHIPPED | OUTPATIENT
Start: 2019-06-28 | End: 2019-07-28

## 2019-06-28 ASSESSMENT — ENCOUNTER SYMPTOMS
BLURRED VISION: 0
ORTHOPNEA: 0
SHORTNESS OF BREATH: 0

## 2019-06-28 NOTE — PROGRESS NOTES
Subjective:      Patient ID: Mabel Avery is a 64 y.o. male. Hyperlipidemia   This is a chronic problem. The problem is uncontrolled. Recent lipid tests were reviewed and are high. He has no history of chronic renal disease, diabetes, hypothyroidism, liver disease or nephrotic syndrome. Pertinent negatives include no chest pain, focal sensory loss, leg pain, myalgias or shortness of breath. Current antihyperlipidemic treatment includes statins. The current treatment provides mild improvement of lipids. There are no compliance problems. Risk factors for coronary artery disease include hypertension, male sex and dyslipidemia. Hypertension   This is a recurrent problem. The current episode started more than 1 year ago. The problem is controlled. Pertinent negatives include no anxiety, blurred vision, chest pain, headaches, malaise/fatigue, neck pain, orthopnea, palpitations, peripheral edema, PND, shortness of breath or sweats. There are no associated agents to hypertension. The current treatment provides significant improvement. There is no history of angina, kidney disease, CAD/MI, CVA, heart failure, left ventricular hypertrophy, PVD or retinopathy. There is no history of chronic renal disease, coarctation of the aorta, hyperaldosteronism, hypercortisolism, hyperparathyroidism, a hypertension causing med, pheochromocytoma, renovascular disease, sleep apnea or a thyroid problem. he does have gouty arthritis sxs currently controlled    He does have primary djd of the joints of knees  sxs are controlled    ddd lumbar spine sxs controlled    Chronic anxiety  No evid for abuse  Review of Systems   Constitutional: Negative for malaise/fatigue. Eyes: Negative for blurred vision. Respiratory: Negative for shortness of breath. Cardiovascular: Negative for chest pain, palpitations, orthopnea and PND. Musculoskeletal: Negative for myalgias and neck pain. Neurological: Negative for headaches.        Objective: Physical Exam   Constitutional: He is oriented to person, place, and time. He appears well-developed and well-nourished. No distress. HENT:   Head: Normocephalic and atraumatic. Right Ear: External ear normal.   Left Ear: External ear normal.   Nose: Nose normal.   Mouth/Throat: Oropharynx is clear and moist. No oropharyngeal exudate. Eyes: Pupils are equal, round, and reactive to light. Conjunctivae and EOM are normal. Right eye exhibits no discharge. Left eye exhibits no discharge. No scleral icterus. Neck: Normal range of motion. Neck supple. No JVD present. No tracheal deviation present. No thyromegaly present. Cardiovascular: Normal rate, regular rhythm, normal heart sounds and intact distal pulses. Exam reveals no friction rub. No murmur heard. Pulses:       Carotid pulses are 2+ on the right side, and 2+ on the left side. Radial pulses are 2+ on the right side, and 2+ on the left side. Femoral pulses are 2+ on the right side, and 2+ on the left side. Popliteal pulses are 2+ on the right side, and 2+ on the left side. Dorsalis pedis pulses are 2+ on the right side, and 2+ on the left side. Posterior tibial pulses are 2+ on the right side, and 2+ on the left side. Pulmonary/Chest: Effort normal and breath sounds normal. No stridor. No respiratory distress. He has no wheezes. He has no rales. He exhibits no tenderness. Abdominal: Soft. Bowel sounds are normal. He exhibits no distension and no mass. There is no tenderness. There is no rebound and no guarding. Musculoskeletal: Normal range of motion. He exhibits no edema or tenderness. Lymphadenopathy:     He has no cervical adenopathy. Neurological: He is oriented to person, place, and time. He displays normal reflexes. No cranial nerve deficit or sensory deficit. He exhibits normal muscle tone. Coordination normal.   Skin: Skin is warm and dry. No rash noted. He is not diaphoretic. No pallor. Psychiatric: He has a normal mood and affect. His behavior is normal. Judgment and thought content normal.   Nursing note and vitals reviewed. Assessment:      1. Pure hyperglyceridemia    - ezetimibe (ZETIA) 10 MG tablet; TAKE 1 TABLET BY MOUTH DAILY  Dispense: 90 tablet; Refill: 1  - LIPID PANEL; Future    2. Essential hypertension    - bisoprolol-hydrochlorothiazide (ZIAC) 10-6.25 MG per tablet; TAKE ONE TABLET BY MOUTH TWICE A DAY  Dispense: 180 tablet; Refill: 1    3. Chronic idiopathic gout involving toe, unspecified laterality    - allopurinol (ZYLOPRIM) 100 MG tablet; TAKE ONE TABLET BY MOUTH TWICE DAILY  Dispense: 180 tablet; Refill: 1  - URIC ACID; Future    4. Primary osteoarthritis of both knees    - ketoprofen (ORUDIS) 50 MG capsule; Take 50 capsules by mouth 2 times daily  Dispense: 180 capsule; Refill: 1    5. Anxiety  Drug screen  oaars  - LORazepam (ATIVAN) 1 MG tablet; Take one tab every other day as needed for anxiety  Dispense: 15 tablet; Refill: 2    6. High risk medication use    - DRUG SCREEN MULTI URINE;  Future          Plan:              Mckay Li MD

## 2019-06-28 NOTE — TELEPHONE ENCOUNTER
Per pt is calling back in regards to the Tadalafil (CIALIS) 2.5 MG TABS     atorvastatin (LIPITOR) 20 MG tablet 90 day supply. Per pt stated that he is currently at the pharmacy and is waiting. Stated that the provider forgot to send his meds and he is in need of his medication.            Thanks           Please advise

## 2019-06-28 NOTE — TELEPHONE ENCOUNTER
Pt states he spoke with you about medication. States it should be a 90 day supply with 2 refills.      atorvastatin (LIPITOR) 20 MG tablet

## 2019-06-29 DIAGNOSIS — E78.00 PURE HYPERCHOLESTEROLEMIA: ICD-10-CM

## 2019-06-29 RX ORDER — ATORVASTATIN CALCIUM 80 MG/1
TABLET, FILM COATED ORAL
Qty: 90 TABLET | Refills: 1 | Status: SHIPPED | OUTPATIENT
Start: 2019-06-29 | End: 2020-02-04

## 2019-06-29 RX ORDER — ALLOPURINOL 300 MG/1
TABLET ORAL
Qty: 90 TABLET | Refills: 1 | Status: SHIPPED | OUTPATIENT
Start: 2019-06-29 | End: 2020-03-24 | Stop reason: SDUPTHER

## 2019-06-29 RX ORDER — ATORVASTATIN CALCIUM 20 MG/1
TABLET, FILM COATED ORAL
Qty: 90 TABLET | Refills: 0 | Status: SHIPPED | OUTPATIENT
Start: 2019-06-29 | End: 2019-06-29 | Stop reason: DRUGHIGH

## 2019-09-18 ENCOUNTER — TELEPHONE (OUTPATIENT)
Dept: PRIMARY CARE CLINIC | Age: 59
End: 2019-09-18

## 2019-09-18 DIAGNOSIS — J01.91 ACUTE RECURRENT SINUSITIS, UNSPECIFIED LOCATION: Primary | ICD-10-CM

## 2019-09-18 RX ORDER — LORATADINE 10 MG/1
10 TABLET ORAL DAILY
Qty: 30 TABLET | Refills: 0 | Status: SHIPPED | OUTPATIENT
Start: 2019-09-18

## 2019-09-18 RX ORDER — AMOXICILLIN 500 MG/1
TABLET, FILM COATED ORAL
Qty: 30 TABLET | Refills: 0 | Status: SHIPPED | OUTPATIENT
Start: 2019-09-18 | End: 2020-11-04 | Stop reason: ALTCHOICE

## 2019-09-26 DIAGNOSIS — N52.01 ERECTILE DYSFUNCTION DUE TO ARTERIAL INSUFFICIENCY: ICD-10-CM

## 2019-09-27 RX ORDER — TADALAFIL 2.5 MG/1
TABLET ORAL
Qty: 15 TABLET | Refills: 11 | Status: SHIPPED | OUTPATIENT
Start: 2019-09-27 | End: 2020-06-26 | Stop reason: SDUPTHER

## 2020-02-13 NOTE — TELEPHONE ENCOUNTER
Requested Prescriptions     Pending Prescriptions Disp Refills    bisoprolol-hydrochlorothiazide (ZIAC) 10-6.25 MG per tablet [Pharmacy Med Name: BISOPROLOL-HCTZ 10-6.25 MG TAB] 180 tablet 0     Sig: TAKE ONE TABLET BY MOUTH TWICE A DAY     4/2/18 Detail Level: Generalized

## 2020-03-19 ENCOUNTER — OFFICE VISIT (OUTPATIENT)
Dept: ORTHOPEDIC SURGERY | Age: 60
End: 2020-03-19
Payer: COMMERCIAL

## 2020-03-19 VITALS — TEMPERATURE: 97 F

## 2020-03-19 PROBLEM — M25.511 ACUTE PAIN OF RIGHT SHOULDER: Status: ACTIVE | Noted: 2020-03-19

## 2020-03-19 PROBLEM — S46.011A ROTATOR CUFF STRAIN, RIGHT, INITIAL ENCOUNTER: Status: ACTIVE | Noted: 2020-03-19

## 2020-03-19 PROCEDURE — L3660 SO 8 AB RSTR CAN/WEB PRE OTS: HCPCS | Performed by: PHYSICIAN ASSISTANT

## 2020-03-19 PROCEDURE — 99214 OFFICE O/P EST MOD 30 MIN: CPT | Performed by: PHYSICIAN ASSISTANT

## 2020-03-19 NOTE — LETTER
[facility]  [facility] [address] [address]  Phone: [phone]  Fax: [phone]    Caryle Cheadle, [address]        March 19, 2020     Patient: Tamera Dove   YOB: 1960   Date of Visit: 3/19/2020       To Whom It May Concern: It is my medical opinion that Tamera Dove should not use the right arm for 4 weeks. If you have any questions or concerns, please don't hesitate to call.     Sincerely,          Caryle Cheadle, PA

## 2020-03-19 NOTE — PROGRESS NOTES
500 MG TABS Take one tablet 3 times a day for 10 days 30 tablet 0    loratadine (CLARITIN) 10 MG tablet Take 1 tablet by mouth daily 30 tablet 0    allopurinol (ZYLOPRIM) 300 MG tablet Take one tab a day & cancel script for allopurinol 100 mg 90 tablet 1    ezetimibe (ZETIA) 10 MG tablet TAKE 1 TABLET BY MOUTH DAILY 90 tablet 1    bisoprolol-hydrochlorothiazide (ZIAC) 10-6.25 MG per tablet TAKE ONE TABLET BY MOUTH TWICE A  tablet 1    ketoprofen (ORUDIS) 50 MG capsule Take 50 capsules by mouth 2 times daily 180 capsule 1    ketoprofen (ORUDIS) 75 MG capsule Take 1 capsule by mouth 4 times daily as needed for Pain 120 capsule 0    Multiple Vitamins-Minerals (THERAPEUTIC MULTIVITAMIN-MINERALS) tablet Take 1 tablet by mouth daily       No current facility-administered medications for this visit. Objective:   He  is alert, oriented x 3, pleasant, well nourished, developed and in no acute distress. Temp 97 °F (36.1 °C) (Temporal)      SHOULDER EXAM:  Examination of the right shoulder shows: There is not a deformity. There is not erythema. There is not soft tissue swelling. Deltoid region is  tender to palpation. AC Joint is not tender to palpation. Clavicle is not tender to palpation. Bicipital Groove is not tender to palpation. Pectoralis  is not tender to palpation. Scapula/ trapezius is not tender to palpation. There is weakness with supraspinatus testing. There is pain with supraspinatus testing. Yergason Test negative. Drop Arm Test negative. Apprehension Test negative. Cross Arm Test negative. Supraspinatus Test positive. Shoulder AROM-  FF 60 degrees  Abduction 60 degrees  IR to ASIS                 ER 20     NEUROLOGICAL EXAM:  Examination of the upper extremities are intact with sensation to light touch. Motor testing  5/5 in all major motor groups including hand intrinsics. Radial, Median and Ulnar nerves are intact. Borjas's Sign absent.        VASCULAR

## 2020-03-24 RX ORDER — ATORVASTATIN CALCIUM 80 MG/1
TABLET, FILM COATED ORAL
Qty: 90 TABLET | Refills: 1 | OUTPATIENT
Start: 2020-03-24 | End: 2020-06-26 | Stop reason: SDUPTHER

## 2020-03-24 RX ORDER — ALLOPURINOL 300 MG/1
TABLET ORAL
Qty: 90 TABLET | Refills: 1 | OUTPATIENT
Start: 2020-03-24 | End: 2020-06-23 | Stop reason: SDUPTHER

## 2020-03-25 ENCOUNTER — TELEPHONE (OUTPATIENT)
Dept: ORTHOPEDIC SURGERY | Age: 60
End: 2020-03-25

## 2020-03-25 NOTE — TELEPHONE ENCOUNTER
Pt is calling to see if Enrico Wade can order an open MRI at [facility]. He also wanted to know what Dr in [facility] he was referred to for his Back. I thought Religious, but I wasn't positive I told him.   Please call

## 2020-03-25 NOTE — TELEPHONE ENCOUNTER
Pt said he needs an order for his MRI to be faxed to [facility] AT [facility] [address] [phone].

## 2020-03-31 ENCOUNTER — TELEPHONE (OUTPATIENT)
Dept: ORTHOPEDIC SURGERY | Age: 60
End: 2020-03-31

## 2020-04-08 ENCOUNTER — TELEPHONE (OUTPATIENT)
Dept: ORTHOPEDIC SURGERY | Age: 60
End: 2020-04-08

## 2020-04-09 ENCOUNTER — TELEPHONE (OUTPATIENT)
Dept: ORTHOPEDIC SURGERY | Age: 60
End: 2020-04-09

## 2020-04-29 ENCOUNTER — TELEPHONE (OUTPATIENT)
Dept: ORTHOPEDIC SURGERY | Age: 60
End: 2020-04-29

## 2020-05-07 ENCOUNTER — TELEPHONE (OUTPATIENT)
Dept: ORTHOPEDIC SURGERY | Age: 60
End: 2020-05-07

## 2020-05-08 ENCOUNTER — TELEPHONE (OUTPATIENT)
Dept: ORTHOPEDIC SURGERY | Age: 60
End: 2020-05-08

## 2020-05-08 NOTE — TELEPHONE ENCOUNTER
Wants note from yesterday faxed to his . Faxed to [phone]  Also want this emailed. To [email or web address]   Told him that I would have to check with the physican re the medication he was fine with this

## 2020-05-08 NOTE — TELEPHONE ENCOUNTER
PATIENT WOULD LIKE A CALL BACK REGARDING A WORK NOTE THAT NEEDS TO BE EXTENDED BEYOND 5/28 AND NEEDS TO SAY NO USE OF RIGHT ARM [address]. [phone]

## 2020-05-28 ENCOUNTER — OFFICE VISIT (OUTPATIENT)
Dept: ORTHOPEDIC SURGERY | Age: 60
End: 2020-05-28
Payer: COMMERCIAL

## 2020-05-28 VITALS — TEMPERATURE: 97.2 F

## 2020-05-28 PROCEDURE — 99214 OFFICE O/P EST MOD 30 MIN: CPT | Performed by: PHYSICIAN ASSISTANT

## 2020-05-28 NOTE — PROGRESS NOTES
tendon involving the insertion footplate. There is no tendon retraction. Mild tendinosis distal infraspinatus tendon is identified with a focus of intrasubstance fissuring. The rotator cuff tendons and muscles are otherwise normal.  Moderate degenerative hypertrophic AC joint is identified. Small glenohumeral joint effusion is identified. The long head biceps tendon is preserved. X Rays: not performed in the office today:     Diagnosis:        ICD-10-CM    1. Rotator cuff strain, right, initial encounter S46.011A         Assessment/ Plan:      I did spend 30 minutes in the office today with greater than 50% of this time counseling, reviewing diagnostic tests, face to face discussion concerning their diagnosis and treatment options. All of their questions were answered. Full-thickness tear of the distal supraspinatus tendon. Symptoms are related to his work injury. The natural history of the patient's diagnosis as well as the treatment options were discussed in full and questions were answered. Risks and benefits of the treatment options also reviewed in detail. Recommend physical therapy 2-3 times a week for 4 weeks. If no improvement then consider surgical repair. We will add diagnosis of rotator cuff tear of the supraspinatus tendon. Request physical therapy 2-3 times a week for 4 weeks. He will be allowed to return to work with restrictions, no use of the right upper extremity for 6 weeks. Follow Up: 4-6 weeks. Call or return to clinic prn if these symptoms worsen or fail to improve as anticipated.

## 2020-06-04 ENCOUNTER — TELEPHONE (OUTPATIENT)
Dept: INTERNAL MEDICINE CLINIC | Age: 60
End: 2020-06-04

## 2020-06-09 ENCOUNTER — TELEPHONE (OUTPATIENT)
Dept: PRIMARY CARE CLINIC | Age: 60
End: 2020-06-09

## 2020-06-09 ENCOUNTER — TELEPHONE (OUTPATIENT)
Dept: ORTHOPEDIC SURGERY | Age: 60
End: 2020-06-09

## 2020-06-09 NOTE — TELEPHONE ENCOUNTER
Pt called; req a referral to be sent over to [facility] for back pain. Please advise.      Call [phone] to discuss     Ph. [phone]   Fx [phone] or [phone]     [address]

## 2020-06-09 NOTE — TELEPHONE ENCOUNTER
[facility] received a call from:    Name of Caller: Godwin Beard contact number: [phone]    Reason for call: was told from specialist to ask PCP for a referral for patients back to Dr. Alia Alexis from [facility]    Fax: [phone]    [place]:[phone]  Fax : [phone]    Bring to Attention of Kathy Willingham

## 2020-06-16 ENCOUNTER — TELEPHONE (OUTPATIENT)
Dept: ORTHOPEDIC SURGERY | Age: 60
End: 2020-06-16

## 2020-06-18 ENCOUNTER — TELEPHONE (OUTPATIENT)
Dept: ORTHOPEDIC SURGERY | Age: 60
End: 2020-06-18

## 2020-06-23 ENCOUNTER — TELEPHONE (OUTPATIENT)
Dept: ADMINISTRATIVE | Age: 60
End: 2020-06-23

## 2020-06-23 RX ORDER — ALLOPURINOL 300 MG/1
TABLET ORAL
Qty: 90 TABLET | Refills: 1 | Status: SHIPPED | OUTPATIENT
Start: 2020-06-23 | End: 2020-06-26 | Stop reason: SDUPTHER

## 2020-06-23 NOTE — TELEPHONE ENCOUNTER
Referral faxed to number provided.   Med pended- naproxen has not been rx by you before I dont see if

## 2020-06-25 ENCOUNTER — TELEPHONE (OUTPATIENT)
Dept: ORTHOPEDIC SURGERY | Age: 60
End: 2020-06-25

## 2020-06-26 ENCOUNTER — VIRTUAL VISIT (OUTPATIENT)
Dept: PRIMARY CARE CLINIC | Age: 60
End: 2020-06-26
Payer: COMMERCIAL

## 2020-06-26 VITALS — WEIGHT: 270 LBS | BODY MASS INDEX: 31.88 KG/M2 | HEIGHT: 77 IN

## 2020-06-26 PROCEDURE — 99406 BEHAV CHNG SMOKING 3-10 MIN: CPT | Performed by: FAMILY MEDICINE

## 2020-06-26 PROCEDURE — 99215 OFFICE O/P EST HI 40 MIN: CPT | Performed by: FAMILY MEDICINE

## 2020-06-26 RX ORDER — ALLOPURINOL 300 MG/1
TABLET ORAL
Qty: 90 TABLET | Refills: 1 | Status: SHIPPED | OUTPATIENT
Start: 2020-06-26 | End: 2021-04-22

## 2020-06-26 RX ORDER — SERTRALINE HYDROCHLORIDE 25 MG/1
TABLET, FILM COATED ORAL
Qty: 60 TABLET | Refills: 5 | Status: SHIPPED | OUTPATIENT
Start: 2020-06-26 | End: 2020-10-06 | Stop reason: ALTCHOICE

## 2020-06-26 RX ORDER — BISOPROLOL FUMARATE AND HYDROCHLOROTHIAZIDE 10; 6.25 MG/1; MG/1
TABLET ORAL
Qty: 180 TABLET | Refills: 1 | Status: SHIPPED | OUTPATIENT
Start: 2020-06-26 | End: 2021-07-13 | Stop reason: SDUPTHER

## 2020-06-26 RX ORDER — IBUPROFEN 800 MG/1
TABLET ORAL
Qty: 120 TABLET | Refills: 0 | Status: SHIPPED | OUTPATIENT
Start: 2020-06-26 | End: 2020-09-20 | Stop reason: ALTCHOICE

## 2020-06-26 RX ORDER — ZOSTER VACCINE RECOMBINANT, ADJUVANTED 50 MCG/0.5
0.5 KIT INTRAMUSCULAR ONCE
Qty: 0.5 ML | Refills: 0 | Status: SHIPPED | OUTPATIENT
Start: 2020-06-26 | End: 2020-06-26

## 2020-06-26 RX ORDER — EZETIMIBE 10 MG/1
TABLET ORAL
Qty: 90 TABLET | Refills: 1 | Status: SHIPPED | OUTPATIENT
Start: 2020-06-26 | End: 2020-10-06 | Stop reason: ALTCHOICE

## 2020-06-26 RX ORDER — TADALAFIL 2.5 MG/1
TABLET ORAL
Qty: 15 TABLET | Refills: 11 | Status: SHIPPED | OUTPATIENT
Start: 2020-06-26 | End: 2020-08-13 | Stop reason: DRUGHIGH

## 2020-06-26 RX ORDER — COLCHICINE 0.6 MG/1
0.6 CAPSULE ORAL DAILY
Qty: 30 CAPSULE | Refills: 2 | Status: SHIPPED | OUTPATIENT
Start: 2020-06-26 | End: 2021-06-29

## 2020-06-26 RX ORDER — ATORVASTATIN CALCIUM 80 MG/1
TABLET, FILM COATED ORAL
Qty: 90 TABLET | Refills: 1 | Status: SHIPPED | OUTPATIENT
Start: 2020-06-26 | End: 2021-07-13 | Stop reason: SDUPTHER

## 2020-06-26 ASSESSMENT — ENCOUNTER SYMPTOMS
VOMITING: 0
SORE THROAT: 0
COUGH: 0
BACK PAIN: 0
EYE ITCHING: 0
WHEEZING: 0
SHORTNESS OF BREATH: 0
FACIAL SWELLING: 0
BLOOD IN STOOL: 0
EYE REDNESS: 0
ANAL BLEEDING: 0
EYE DISCHARGE: 0
ABDOMINAL PAIN: 0
COLOR CHANGE: 0
CONSTIPATION: 0
VOICE CHANGE: 0
RECTAL PAIN: 0
EYE PAIN: 0
SINUS PRESSURE: 0
CHEST TIGHTNESS: 0
PHOTOPHOBIA: 0
NAUSEA: 0
CHOKING: 0
TROUBLE SWALLOWING: 0
APNEA: 0

## 2020-06-26 ASSESSMENT — PATIENT HEALTH QUESTIONNAIRE - PHQ9
SUM OF ALL RESPONSES TO PHQ9 QUESTIONS 1 & 2: 0
SUM OF ALL RESPONSES TO PHQ QUESTIONS 1-9: 0
2. FEELING DOWN, DEPRESSED OR HOPELESS: 0
1. LITTLE INTEREST OR PLEASURE IN DOING THINGS: 0
SUM OF ALL RESPONSES TO PHQ QUESTIONS 1-9: 0

## 2020-06-26 NOTE — PROGRESS NOTES
Days, severe pain, swelling,present day/night,getting worse at this time. No other factors. No relieving factor but small dose of ibuprofen otc. Not getting better. Hypertension  No sob, no leg edema, no headaches  No blurred vision  No chest pain    Hyperlipidemia  On statin  No muscle spasms    Morbid obese  Wt Readings from Last 3 Encounters:   06/26/20 270 lb (122.5 kg)   06/28/19 291 lb 3.2 oz (132.1 kg)   07/17/18 290 lb (131.5 kg)       ED  cialis works  No uti sxs    Tobacco abuse disorder  1 pack lasts for 3 days, smoked for 7 yrs  Trying to stop      Past Medical History:   Diagnosis Date    Anxiety     Gout     Hyperlipidemia     Hypertension     Knee pain      Social History     Socioeconomic History    Marital status:      Spouse name: Not on file    Number of children: Not on file    Years of education: Not on file    Highest education level: Not on file   Occupational History    Not on file   Social Needs    Financial resource strain: Not on file    Food insecurity     Worry: Not on file     Inability: Not on file   [employer] needs     Medical: Not on file     Non-medical: Not on file   Tobacco Use    Smoking status: Light Tobacco Smoker     Packs/day: 0.25     Types: Cigarettes    Smokeless tobacco: Never Used   Substance and Sexual Activity    Alcohol use:  Yes     Alcohol/week: 0.0 standard drinks     Comment: socially    Drug use: No    Sexual activity: Not on file   Lifestyle    Physical activity     Days per week: Not on file     Minutes per session: Not on file    Stress: Not on file   Relationships    Social connections     Talks on phone: Not on file     Gets together: Not on file     Attends Orthodoxy service: Not on file     Active member of club or organization: Not on file     Attends meetings of clubs or organizations: Not on file     Relationship status: Not on file    Intimate partner violence     Fear of current or ex partner: Not on file

## 2020-06-26 NOTE — TELEPHONE ENCOUNTER
Called and he said that he needs someone to call [place] to refer him for his back. Since no one will see him here for his back.   Please call on Monday

## 2020-06-29 NOTE — TELEPHONE ENCOUNTER
Called and advised that the referral needs to come from his PCP. We do not refer to [facility]. Per alireza Banegas for extended work note for the next 6 weeks.     Pancho Doshi  [phone]

## 2020-07-06 ENCOUNTER — TELEPHONE (OUTPATIENT)
Dept: ORTHOPEDIC SURGERY | Age: 60
End: 2020-07-06

## 2020-07-30 ENCOUNTER — TELEPHONE (OUTPATIENT)
Dept: ORTHOPEDIC SURGERY | Age: 60
End: 2020-07-30

## 2020-07-30 ENCOUNTER — OFFICE VISIT (OUTPATIENT)
Dept: ORTHOPEDIC SURGERY | Age: 60
End: 2020-07-30
Payer: COMMERCIAL

## 2020-07-30 VITALS — WEIGHT: 280 LBS | BODY MASS INDEX: 33.06 KG/M2 | HEIGHT: 77 IN | TEMPERATURE: 97.3 F

## 2020-07-30 PROCEDURE — 99213 OFFICE O/P EST LOW 20 MIN: CPT | Performed by: PHYSICIAN ASSISTANT

## 2020-07-30 NOTE — LETTER
[facility]  [address] [address]  Phone: [phone]  Fax: [phone]    She Hernandez MD        July 30, 2020     Patient: Colleen Chisholm Sr. YOB: 1960   Date of Visit: 7/30/2020       To Whom It May Concern: It is my medical opinion that Colleen Chisholm has a mechanism of  injury consistant with rotator cuff tear. He has failed to improve thru Phyical therapy alone. My medical opinion is that he requires rotator cuff repair surgery to obtain maximum medical improvement. If you have any questions or concerns, please don't hesitate to call.     Sincerely,           She Hernandez MD

## 2020-08-03 ENCOUNTER — TELEPHONE (OUTPATIENT)
Dept: ORTHOPEDIC SURGERY | Age: 60
End: 2020-08-03

## 2020-08-03 NOTE — TELEPHONE ENCOUNTER
Received a call from [address] [place] [address]. He is returning your call from the other day stating that Dr. Tori Caruso is not taking workers comp backs. I think he is confused. He has called here many times wanting to scheduled with either Dr. Tori Caruso or Roula Winters.     He isn't sure whom to call now to make an appointment

## 2020-08-04 ENCOUNTER — TELEPHONE (OUTPATIENT)
Dept: ORTHOPEDIC SURGERY | Age: 60
End: 2020-08-04

## 2020-08-04 NOTE — TELEPHONE ENCOUNTER
Apolinar Bedolla is going to see for a 1 time consult only. I called and spoke with patient. He understood that she is not taking over his care but doing a 1 time evaluation. Appointment scheduled.

## 2020-08-08 NOTE — PROGRESS NOTES
This dictation was done with [employer] dictation and may contain mechanical errors related to translation. The review of systems was currently provided by the patient and reviewed with the medical assistant at today's visit. Please see media. Subjective:  Laverne Hall is a 64 y.o. who is here in follow-up for his right shoulder is  Having ongoing pain with overhead activities and work type activities. He has a 6 out of 10 pain and has been going to physical therapy at [facility] where they report approximately a 72 disability symptom score. His MRI showed a full thickness high-grade partial tear of the distal supraspinatus moderate degenerative hypertrophic AC joint effusion present.     Patient Active Problem List   Diagnosis    Knee pain    HTN (hypertension)    Anxiety    Hyperlipemia    Erectile dysfunction    Gout    Sinusitis, acute    Primary osteoarthritis of both knees    Contusion of right knee    Contusion of left knee    Acute pain of right shoulder    Rotator cuff strain, right, initial encounter           Current Outpatient Medications on File Prior to Visit   Medication Sig Dispense Refill    colchicine (MITIGARE) 0.6 MG capsule Take 1 capsule by mouth daily 30 capsule 2    ibuprofen (ADVIL;MOTRIN) 800 MG tablet One tablet 3 times a day as needed 120 tablet 0    allopurinol (ZYLOPRIM) 300 MG tablet Take one tab a day & cancel script for allopurinol 100 mg 90 tablet 1    bisoprolol-hydrochlorothiazide (ZIAC) 10-6.25 MG per tablet TAKE ONE TABLET BY MOUTH TWICE A  tablet 1    Tadalafil (CIALIS) 2.5 MG TABS TAKE 1 TABLET BY MOUTH EVERY DAY 15 tablet 11    sertraline (ZOLOFT) 25 MG tablet Take one tab a day for one week then one tab twice a day 60 tablet 5    ezetimibe (ZETIA) 10 MG tablet TAKE 1 TABLET BY MOUTH DAILY 90 tablet 1    atorvastatin (LIPITOR) 80 MG tablet TAKE 1 TABLET BY MOUTH ONE TIME A DAY 90 tablet 1    Amoxicillin 500 MG TABS Take one tablet 3 times a day for 10 days 30 tablet 0    loratadine (CLARITIN) 10 MG tablet Take 1 tablet by mouth daily 30 tablet 0    Multiple Vitamins-Minerals (THERAPEUTIC MULTIVITAMIN-MINERALS) tablet Take 1 tablet by mouth daily       No current facility-administered medications on file prior to visit. Objective:   Temperature 97.3 °F (36.3 °C), height 6' 5\" (1.956 m), weight 280 lb (127 kg). On examination this is a pleasant 65-year-old gentleman in no acute distress he is alert and oriented x3 he does have some apprehension and some weakness to supraspinatus strength testing. He has a positive crossover and palpable tenderness in the anterior and superior aspect of the right shoulder. I do not see any neurological deficits into the right hand with good  strength good wrist extension strength and thumb extension strength. He has good symmetric motion through the neck with a negative Spurling's test.  Neuro exam grossly intact both lower extremities. Intact sensation to light touch. Motor exam 4+ to 5/5 in all major motor groups. Negative Borjas's sign. Skin is warm, dry and intact with out erythema or significant increased temperature around the knee joint(s). There are no cutaneous lesions or lymphadenopathy present. X-RAYS:  MRI was reviewed with the patient      Assessment:  Right shoulder rotator cuff tear    Plan:  During today's visit, there was approximately 20 minutes of face-to-face discussion in regards to the patient's current condition and treatment options. More than 50 % of the time was counseling and coordination of care. At this point we talked about short and long-term expectations given his age activity and examination findings I feel that a consultation with Dr. Kvng Wallace for evaluation of possible surgery is warranted. This is a Worker's Comp.  injury and we will look to do a C9 to get approval for consultation and possible surgery if rotator cuff tear is not included in claim I would like to see that this gets allowed    PROCEDURE NOTE:   Refer to Dr. Isa Moreno      They will schedule a follow up in as needed

## 2020-08-11 ENCOUNTER — TELEPHONE (OUTPATIENT)
Dept: ORTHOPEDIC SURGERY | Age: 60
End: 2020-08-11

## 2020-08-12 ENCOUNTER — OFFICE VISIT (OUTPATIENT)
Dept: ORTHOPEDIC SURGERY | Age: 60
End: 2020-08-12
Payer: COMMERCIAL

## 2020-08-12 VITALS — BODY MASS INDEX: 31.88 KG/M2 | TEMPERATURE: 97.4 F | WEIGHT: 270 LBS | HEIGHT: 77 IN

## 2020-08-12 PROCEDURE — 99213 OFFICE O/P EST LOW 20 MIN: CPT | Performed by: PHYSICIAN ASSISTANT

## 2020-08-12 NOTE — PROGRESS NOTES
History of present illness:   Mr. Steve Herbert  is a pleasant 64 y.o. male with a PMH of HTN, HLD, gout, and anxiety here for consultation regarding his LBP and bilateral leg pain. He was previously evaluated by Dr. German Dominguez and Corey García CNP for low back pain from a workers comp injury in 2018. Patient states his low back and leg pain was resolved until new injury. He states his current pain is a new worker's compensation claim from an injury 3/13/20. He states his pain began after hooking up a trailer at work. Sustained a right shoulder injury at this time as well. His pain has steadily increased since then. He rates his back pain 7/10 and leg pain 7/10. He describes the pain as sharp, aching and constant that is worse with standing, rising from sitting, walking, and lying down and better with sitting and walking with grocery cart. The leg pain radiates down the posterior aspect of his legs to his ankles bilaterally. States R=L and alternates sides sporadically. He admits numbness and tingling in his posterior legs. He admits generalized weakness of his legs and denies bowel or bladder dysfunction. He states he can sit for a maximum of 30 minutes and stand for a maximum 10 minutes. The pain significantly disrupts his sleep. He takes NSAIDs. Past medical history:  His past medical history has been reviewed and is significant for HTN, HLD, gout, and anxiety    Past surgical history:  His past surgical history has been reviewed and is non-contributory to his present illness. His medications and allergies were reviewed.     Social history:  His social history has been reviewed and he smokes 0.25ppd    Current Medication:  Current Outpatient Medications   Medication Sig Dispense Refill    colchicine (MITIGARE) 0.6 MG capsule Take 1 capsule by mouth daily 30 capsule 2    ibuprofen (ADVIL;MOTRIN) 800 MG tablet One tablet 3 times a day as needed 120 tablet 0    allopurinol (ZYLOPRIM) 300 MG tablet Take one strength of bilateral lower extremities. He has a negative straight leg raise, bilaterally. Deep tendon reflexes at knees and achilles are 2+. Sensation is intact to light touch L3 to S1 bilaterally. He has no clonus. Hip range of motion painless. Imaging:  I reviewed MRI images of his lumbar spine from 6/25/18. They note multilevel lumbar spondylosis with degenerative disc L5-S1 with possible annular tear. No neural impingement appreciated. Assessment:  Lumbar strain  Lumbar HNP    Plan:  We discussed treatment options including observation, physical therapy, epidural injection and additional imaging. He would like to proceed with new lumbar MRI since this is a new injury. May consider PT/JANNA pending results. We will call him with results.      Severa Campi, PA-C

## 2020-08-13 ENCOUNTER — TELEPHONE (OUTPATIENT)
Dept: ORTHOPEDIC SURGERY | Age: 60
End: 2020-08-13

## 2020-08-13 ENCOUNTER — TELEPHONE (OUTPATIENT)
Dept: PRIMARY CARE CLINIC | Age: 60
End: 2020-08-13

## 2020-08-13 RX ORDER — TADALAFIL 5 MG/1
5 TABLET ORAL DAILY
Qty: 90 TABLET | Refills: 1 | Status: SHIPPED | OUTPATIENT
Start: 2020-08-13 | End: 2021-07-13 | Stop reason: SDUPTHER

## 2020-08-13 NOTE — TELEPHONE ENCOUNTER
Patient say 2.5mgs of Tadalafil is not working well and would like Dr. Tim Aparicio to up the dosage to 5mgs. He also wants 90 day supply with refills sent to [facility] on [address] near [employer]. Please call and advise.  Last OV 6/26/2020 Virtual

## 2020-08-14 ENCOUNTER — TELEPHONE (OUTPATIENT)
Dept: ORTHOPEDIC SURGERY | Age: 60
End: 2020-08-14

## 2020-09-03 ENCOUNTER — TELEPHONE (OUTPATIENT)
Dept: ORTHOPEDIC SURGERY | Age: 60
End: 2020-09-03

## 2020-09-10 ENCOUNTER — OFFICE VISIT (OUTPATIENT)
Dept: ORTHOPEDIC SURGERY | Age: 60
End: 2020-09-10
Payer: COMMERCIAL

## 2020-09-10 VITALS — TEMPERATURE: 97 F | BODY MASS INDEX: 31.88 KG/M2 | HEIGHT: 77 IN | WEIGHT: 270 LBS

## 2020-09-10 PROCEDURE — 99213 OFFICE O/P EST LOW 20 MIN: CPT | Performed by: PHYSICIAN ASSISTANT

## 2020-09-10 NOTE — PROGRESS NOTES
This dictation was done with [employer] dictation and may contain mechanical errors related to translation. Temperature 97 °F (36.1 °C), temperature source Temporal, height 6' 5\" (1.956 m), weight 270 lb (122.5 kg). This is a pleasant 70-year-old gentleman who has ongoing pain in his right shoulder related to [employer]. injury dating back about 6 months. He was lifting something at work strained his shoulder ultimately did physical therapy and had an MRI. This MRI did show partial tear and at my last visit I recommend that he go see Dr. Dr. Terence Wilson for a surgical consultation. At this point he had not gone and seen the other doctor he has been doing things physical therapy and exercises but not only his shoulder and his back but he is disabled to the point where he cannot do his regular occupation. We had him on restrictions and is here for reevaluation. Examining his shoulder he still has exquisite weakness with supraspinous strength testing pain with crossover all consistent with a partial rotator cuff tear that he see on the MRI. Impression is right shoulder rotator cuff partial tear  I reiterated the importance of following through her consultations and I recommend that he go see Dr. Terence Wilson for evaluation of possible surgical intervention.   I did extend his work restrictions based on the fact that he cannot return to his regular occupation at this point it is medically necessary that he does not continue to put too much pressure on his shoulder as he is trying to heal it    He will see Dr. Isa Moreno sometime in the near future

## 2020-09-14 ENCOUNTER — TELEPHONE (OUTPATIENT)
Dept: ORTHOPEDIC SURGERY | Age: 60
End: 2020-09-14

## 2020-09-15 ENCOUNTER — OFFICE VISIT (OUTPATIENT)
Dept: ORTHOPEDIC SURGERY | Age: 60
End: 2020-09-15
Payer: COMMERCIAL

## 2020-09-15 VITALS — TEMPERATURE: 97.2 F | WEIGHT: 270 LBS | HEIGHT: 77 IN | BODY MASS INDEX: 31.88 KG/M2 | RESPIRATION RATE: 16 BRPM

## 2020-09-15 PROCEDURE — 99243 OFF/OP CNSLTJ NEW/EST LOW 30: CPT | Performed by: ORTHOPAEDIC SURGERY

## 2020-09-15 NOTE — LETTER
Surgery Scheduling Form:    Patient Name:  Rachid Esquivel Sr.  Patient :  1960     Patient MR#:  [id number]    Patient Address:  [address] [place] [address]    Location:  [facility]  Surgeon:  Jessi Blackwell. Jesse Lynne M.D.    PCP:  Love Farr MD  Insurance:    Payor/Plan Subscr  Sex Relation Sub. Ins. ID Effective Group Num   1. [place] 1960 Male  [id number]                                    [address]   2. GENERIC SELF-* Mohsen Getting 1960 Male Self [id number] 3/13/20                                    [address] [place] [address]     Diagnosis:  Right shoulder rotator cuff tear S46.011A    Operation:  Right shoulder arthroscopy, subacromial decompression, rotator cuff repair and indicated procedures. Surgeon's Scheduling Instruction:  elective  Requested Date: 20 OR Time:  9:15       OR Time Required:  75  Minutes    Anesthesia:  General + block  Surgical Assistant required:  Yes     Equipment:  Arthrex    Standard C-Arm:  No    Mini C-Arm:  No  Status:  Outpatient   PAT Required:  Yes  Comments:       Jessi Blackwell. Jesse Lynne MD      9/15/20    BILLING INFORMATION:                                                                                               . Date of procedure:  Procedure:       CPT Code Modifier                Pre-Certification: [facility] approved                                                Pre-Admission Testing Order Set   In accordance with our formulary system, a generic equivalent drug may be dispensed and administered unless a D.A. W. is written with the medication order  All orders without checkboxes will processed automatically unless crossed out. Orders with checkboxes MUST be checked in order to be carried out. Rachid Esquivel Sr.                                                        1960  Date of Procedure/Surgery: 20  1.  H & P for ALL procedure/surgery completed within 30 days, to be updated day of procedure/surgery 2. [ ]Consults: PT/OT evaluation  3. [X ]Defer to Anesthesia for Pre-Admission testing orders  4. Diagnostic Tests:  [ ]EKG (if not completed in last 3 months) IF: (check all that apply)   Other:  [ [employer] (if not completed in last 6 months) IF: (check all that apply)   Hx Malignancy   Hx CVS/Thoracic Surg   CVS Disease   Hx Pneumonia last 3 months   Chronic Pulm Disease  Other:  [ ]Radiology   Knee Right Left Standing AP knee, hip to ankle on scoliosis film Other:  5. Labs  [ ]Basic Metabolic Profile  IF: (check all that apply)  [ ]Albumin    Other:     ________________________________________________________________  [ ]CBC without diff   Pre op exam      ________________________________________________________________  [ ]PT/INR  PTT   Pre op exam         ________________________________________________________________  [ ]Type & Screen   Surg with potiential for signif. blood loss  [ ]Type & cross match 2 units         ________________________________________________________________  [ ]Urine routine reflex to culture (UAR) If cultures positive, repeat on                  admission [ [place] catch urine  [ ]Nasal culture for MRSA   [ ]Nasal MRSA culture  ________________________________________________________________  6. [ ]Other:    TO: __________________________________ Date: _______ Time:_________    Physician Signature:         9/21/2020    10:46 AM         Pre-operative Physician Prophylaxis Orders    Kim King Sr.  1960  All orders without checkboxes will be processed automatically unless crossed out. Orders with checkboxes MUST be checked in order to be carried out. 1. Allergies: Patient has no known allergies. 2. Procedure: Right shoulder arthroscopy, subacromial decompression, rotator cuff repair and indicated procedures.           Ht Readings from Last 1 Encounters:   09/15/20 6' 5\" (1.956 m)               Wt Readings from Last 1 Encounters:   09/15/20 270 lb (122.5 kg) 4.   [ ] DVT Prophylaxis-Contraindication (Do not give)  Allergy to heparin Currently on anticoagulation  Not indicated, low risk patient  Thrombocytopenia Admitted for bleeding diagnosis Surgery or invasive procedure  Gala.Louann ] Sequential Compression Boots to unaffected or bilateral extremities  [ ] Venous Compression Hose (SHADI hose) to unaffected or bilateral extremities        Knee high  [ ] Heparin 5,000 units subcutaneously 1-2 hours pre op into the thigh opposite of operative site  5.   [ ] Beta Blocker  Patient to take beta blocker the morning of surgery with a sip of water as prescribed at home  Other:  6. Gala.Louann ] Antimicrobial Prophylaxis (Consensus Guideline Recommendations) for       S.C.I. P. procedures  All antibiotics to be initiated 30 minutes pre-op (prior to leaving pre-op hold area/ACU) EXCEPT: Vancomycin and Ciprofloxacin. Initiate Vancomycin and Ciprofloxacin 2 hours pre-op. For combination antibiotic orders, start Ciprofloxacin first, then start second antibiotic ordered. In house patients, send pre-op antibiotics with patient to pre-op hold, do not initiate.   Surgical Procedure Routine pre-op Antibiotic  Penicillin or Cephalosporin Allergy  Orthopaedic  X Cefazolin (Ancef) 2 gm IVPB x1 X Clindamycin 900 mg IVPB x1    or     If > 80 kg Cefazolin 2 gm IVPB x1 Vancomycin 1 gm IVPB x1  Approved indications for Vancomycin:  MRSA related chronic wound dialysis  Other antibiotic to be given:  TO: ________________________________Date: ____________ Time: _______    Physician Signature:                Date: 9/21/2020      Time: 10:46 AM

## 2020-09-15 NOTE — Clinical Note
The diagnosis needs to be added to the claim. He will also need post op outpatient PT 1-2xs/week for 6 weeks. And a cold therapy unit with pad [id number]. The location of [facility] was already on the letter. I hope this is everything you need.

## 2020-09-15 NOTE — PROGRESS NOTES
CHIEF COMPLAINT: Right shoulder pain    DATE OF INJURY: 3/13/20    History:    Josefa Chu Sr. is a 64 y.o. right handed male referred by SRINATH Curry for Sport Medicine consultation for evaluation and treatment of Right shoulder pain. This is evaluated as a workman's compensation injury out of [facility]. The pain is described as aching. The pain began 6 months ago. There was an injury. He was drop and hooking a trailer to truck. Pain is rated as a 7/10. Pain is located lateral. The symptoms are worse with reaching, lifting, work at or above shoulder height, sleeping on affected side, abduction. Symptoms improve with ice, ibuprofen. Limited activities include work overhead. Mild stiffness, mild weakness reported. The patient does report night pain. The patient has had PT. The patient has not had an injection. The patient has tried NSAIDs. The patient has tried ice. Patient's occupation is . Outside reports reviewed:  office notes from Hawa Dodd.     Past Medical History:   Diagnosis Date    Anxiety     Gout     Hyperlipidemia     Hypertension     Knee pain        Past Surgical History:   Procedure Laterality Date    KNEE ARTHROSCOPY  right knee       Current Outpatient Medications on File Prior to Visit   Medication Sig Dispense Refill    tadalafil (CIALIS) 5 MG tablet Take 1 tablet by mouth daily 90 tablet 1    colchicine (MITIGARE) 0.6 MG capsule Take 1 capsule by mouth daily 30 capsule 2    ibuprofen (ADVIL;MOTRIN) 800 MG tablet One tablet 3 times a day as needed 120 tablet 0    allopurinol (ZYLOPRIM) 300 MG tablet Take one tab a day & cancel script for allopurinol 100 mg 90 tablet 1    bisoprolol-hydrochlorothiazide (ZIAC) 10-6.25 MG per tablet TAKE ONE TABLET BY MOUTH TWICE A  tablet 1    sertraline (ZOLOFT) 25 MG tablet Take one tab a day for one week then one tab twice a day 60 tablet 5    ezetimibe (ZETIA) 10 MG tablet TAKE 1 TABLET BY MOUTH DAILY 90 tablet 1    insertion footplate. There is no tendon retraction. Mild tendinosis distal infraspinatus tendon with focus of intrasubstance fissuring. Moderate degenerative hypertrophic AC joint. Small glenohumeral joint effusion. The long head of biceps tendon is preserved. \"  Per my review, there is full-thickness tear of the supraspinatus. Assessment:      Right shoulder rotator cuff tear  Gout  HTN  Anxiety      Plan:      Natural history and expected course discussed. Questions answered. Discussed that I think he does have full thickness traumatic tear of his rotator cuff. I had an extensive discussion with Mr. Marcia Galindo. and/or family regarding the natural history, etiology, and long term consequences of his condition. I have outlined a treatment plan with them and, in my opinion, surgical intervention is indicated at this time. I have discussed the potential complications (including, but not limited to injury to nerve or blood vessel, infection, bleeding, DVT or PE, stiffness, incomplete pain relief, need for further surgery, and anesthetic complications), limitations, expectations, alternatives, and risks of the surgical procedure. We also discussed the importance of postoperative rehabilitation. He has had full opportunity to ask his questions. I have answered them all to his satisfaction. I feel that Mr. Marcia Galindo understands our discussion today and he will provide written informed consent for the procedure. Plan for right shoulder arthroscopy, subacromial decompression, rotator cuff repair. The patient will see their PCP for H&P and clearance for surgery.

## 2020-09-15 NOTE — LETTER
Surgery Scheduling Form:    Patient Name:  Jennyfer Hedrick Sr.  Patient :  1960     Patient MR#:  [id number]    Patient Address:  [address] [place], [place] [id number]    Location:  [facility]  Surgeon:  Kalin Kennedy. Benji Anna M.D.    PCP:  Octavio Arango MD  Insurance:  Payor: GENERIC SELF-INSURED / Plan: GENERIC SELF-INSURED [facility] / Product Type: *No Product type* /       Diagnosis:  Right shoulder rotator cuff tear S46.011A    Operation:  Right shoulder arthroscopy, subacromial decompression, rotator cuff repair and indicated procedures. Surgeon's Scheduling Instruction:  elective  Requested Date:     OR Time:       Patient Arrival Time:      OR Time Required:  76  Minutes    Anesthesia:  General + block  Surgical Assistant required:  Yes     Equipment:  Arthrex    Standard C-Arm:  No    Mini C-Arm:  No  Status:  Outpatient   PAT Required:  Yes  Comments:               Kalin Kennedy. Benji Anna MD      9/15/20    BILLING INFORMATION:                                                                                               .     Date of procedure:  Procedure:       CPT Code Modifier                  Pre-Certification:

## 2020-09-18 ENCOUNTER — TELEPHONE (OUTPATIENT)
Dept: PRIMARY CARE CLINIC | Age: 60
End: 2020-09-18

## 2020-09-18 NOTE — TELEPHONE ENCOUNTER
Pt states he remembers the name of the medication he would like Dr. Nickie Cuevas to prescribe: Indomethacin 50 mg. Take 3 times a day when you have a flare up. Then it is once a day as needed.        PHONE:   [phone]

## 2020-09-20 RX ORDER — INDOMETHACIN 50 MG/1
50 CAPSULE ORAL 2 TIMES DAILY WITH MEALS
Qty: 60 CAPSULE | Refills: 3 | Status: ON HOLD | OUTPATIENT
Start: 2020-09-20 | End: 2020-11-06 | Stop reason: HOSPADM

## 2020-09-28 ENCOUNTER — OFFICE VISIT (OUTPATIENT)
Dept: ORTHOPEDIC SURGERY | Age: 60
End: 2020-09-28

## 2020-09-28 VITALS — HEIGHT: 77 IN | TEMPERATURE: 97.2 F | BODY MASS INDEX: 31.88 KG/M2 | WEIGHT: 270 LBS

## 2020-09-28 PROCEDURE — 99212 OFFICE O/P EST SF 10 MIN: CPT | Performed by: PHYSICIAN ASSISTANT

## 2020-09-28 NOTE — PROGRESS NOTES
History of present illness:  Mr. Murali Ferrera  is a pleasant 64 y.o. male with a PMH of HTN, HLD, gout, and anxiety following up regarding his LBP and bilateral leg pain. He was previously evaluated by Dr. Michaela Bermudez and Alyssia Galvan CNP for low back pain from a workers comp injury in 2018. Patient states his low back and leg pain was resolved until new injury. He states his current pain is a new worker's compensation claim from an injury 3/13/20. He states his pain began after hooking up a trailer at work. Sustained a right shoulder injury at this time as well. His pain has persisted since last office visit. He rates his pain 7/10 today. He describes the pain as sharp, aching and constant that is worse with standing, rising from sitting, walking, and lying down and better with sitting and walking with grocery cart. The leg pain radiates down the posterior aspect of his legs to his ankles bilaterally. States R=L and alternates sides sporadically. He admits numbness and tingling in his posterior legs. He admits generalized weakness of his legs and denies bowel or bladder dysfunction. He takes NSAIDs. Physical examination:  Mr. Evaristo Diaz Sr.'s most recent vitals:  Vitals  Temp: 97.2 °F (36.2 °C)  Temp Source: Infrared  Height: 6' 5\" (195.6 cm)  Weight: 270 lb (122.5 kg)  Body mass index is 32.02 kg/m². General exam:  He is well-developed and well-nourished, is in obvious pain and alert and oriented to person, place, and time. He demonstrates appropriate mood and affect. His skin is warm and dry. His gait is normal and he walks heel to toe without limp or instability. Back:  He stands with slight lumbar flexion. His lumbar flexion, extension and lateral bending are moderately reduced with pain. He has mild tenderness over his lumbar spine without obvious muscle spasm. The skin over his lumbar spine is normal without a surgical scar.      Lower extremities:  He has 5/5 motor strength of bilateral lower

## 2020-10-05 ENCOUNTER — OFFICE VISIT (OUTPATIENT)
Dept: PRIMARY CARE CLINIC | Age: 60
End: 2020-10-05
Payer: COMMERCIAL

## 2020-10-05 ENCOUNTER — TELEPHONE (OUTPATIENT)
Dept: ORTHOPEDIC SURGERY | Age: 60
End: 2020-10-05

## 2020-10-05 PROCEDURE — 99211 OFF/OP EST MAY X REQ PHY/QHP: CPT | Performed by: NURSE PRACTITIONER

## 2020-10-05 NOTE — PROGRESS NOTES
Patient presented to [facility] drive up clinic for preop testing. Patient was swabbed and given information advising them to remain isolated until procedure date.

## 2020-10-06 LAB — SARS-COV-2, NAA: NOT DETECTED

## 2020-10-07 PROCEDURE — MISCCOLD COLD THERAPY UNIT AND PAD: Performed by: ORTHOPAEDIC SURGERY

## 2020-10-08 ENCOUNTER — TELEPHONE (OUTPATIENT)
Dept: ORTHOPEDIC SURGERY | Age: 60
End: 2020-10-08

## 2020-10-08 NOTE — TELEPHONE ENCOUNTER
Surgery and/or Procedure Scheduling     Contact Name: FIDE  Surgical/Procedure Request: NEEDS TO CANCEL [address] DUE TO HIS PCP BEING OUT OF TOWN AND CAN'T GET A PHYSICAL  Patient Contact Number: [phone]

## 2020-10-14 ENCOUNTER — TELEPHONE (OUTPATIENT)
Dept: ORTHOPEDIC SURGERY | Age: 60
End: 2020-10-14

## 2020-10-14 NOTE — TELEPHONE ENCOUNTER
[address] [address] APPROVED PT LUMBAR 12 SESSIONS      Called and spoke to patient letting him know that his PT has been approved for his back. Patient would like information to go to [facility] on [address]. I will fax over the external order and the auth to them.

## 2020-10-19 ENCOUNTER — OFFICE VISIT (OUTPATIENT)
Dept: ORTHOPEDIC SURGERY | Age: 60
End: 2020-10-19
Payer: COMMERCIAL

## 2020-10-19 VITALS — TEMPERATURE: 96.6 F

## 2020-10-19 PROBLEM — S46.011A TRAUMATIC COMPLETE TEAR OF RIGHT ROTATOR CUFF: Status: ACTIVE | Noted: 2020-10-19

## 2020-10-19 PROCEDURE — 99213 OFFICE O/P EST LOW 20 MIN: CPT | Performed by: PHYSICIAN ASSISTANT

## 2020-10-19 NOTE — PROGRESS NOTES
Subjective:      Patient ID: Aldair Sterling Sr. is a 64 y.o.  male. Chief Complaint   Patient presents with    Shoulder Problem     Right        HPI: He is here for follow-up pain, [facility] claim. He was on the surgical schedule with Dr. Shantal Campbell for right shoulder arthroscopy, subacromial decompression, rotator cuff repair. He was unable to get a history and physical, preop clearance performed prior to his surgery and therefore that his surgical procedure was canceled. He is back in the office today stating that he does have an appointment with his primary care physician this week for his preop history and physical.  He continues to have moderate right shoulder pain, weakness. Review of Systems:   Negative for fever or chills. Past Medical History:   Diagnosis Date    Anxiety     Gout     Hyperlipidemia     Hypertension     Knee pain        Family History   Problem Relation Age of Onset    Cancer Mother     Diabetes Mother     Heart Failure Mother     Diabetes Father     Heart Disease Father        Past Surgical History:   Procedure Laterality Date    FINGER FRACTURE SURGERY Left     2 MIDDLE FINGERS    KNEE ARTHROSCOPY  right knee       Social History     Occupational History    Not on file   Tobacco Use    Smoking status: Light Tobacco Smoker     Packs/day: 0.25     Years: 8.00     Pack years: 2.00     Types: Cigarettes    Smokeless tobacco: Never Used   Substance and Sexual Activity    Alcohol use:  Yes     Alcohol/week: 0.0 standard drinks     Comment: socially    Drug use: Never    Sexual activity: Not Currently       Current Outpatient Medications   Medication Sig Dispense Refill    SERTRALINE HCL PO Take 1 tablet by mouth as needed      indomethacin (INDOCIN) 50 MG capsule Take 1 capsule by mouth 2 times daily (with meals) As needed stop ibuprofen (Patient taking differently: Take 50 mg by mouth as needed As needed stop ibuprofen) 60 capsule 3    tadalafil (CIALIS) 5 MG tablet Take 1 tablet by mouth daily (Patient taking differently: Take 5 mg by mouth as needed ) 90 tablet 1    colchicine (MITIGARE) 0.6 MG capsule Take 1 capsule by mouth daily (Patient taking differently: Take 0.6 mg by mouth as needed ) 30 capsule 2    allopurinol (ZYLOPRIM) 300 MG tablet Take one tab a day & cancel script for allopurinol 100 mg 90 tablet 1    bisoprolol-hydrochlorothiazide (ZIAC) 10-6.25 MG per tablet TAKE ONE TABLET BY MOUTH TWICE A  tablet 1    atorvastatin (LIPITOR) 80 MG tablet TAKE 1 TABLET BY MOUTH ONE TIME A DAY (Patient taking differently: Take 80 mg by mouth nightly TAKE 1 TABLET BY MOUTH ONE TIME A DAY ) 90 tablet 1    Amoxicillin 500 MG TABS Take one tablet 3 times a day for 10 days 30 tablet 0    loratadine (CLARITIN) 10 MG tablet Take 1 tablet by mouth daily (Patient taking differently: Take 10 mg by mouth as needed ) 30 tablet 0    Multiple Vitamins-Minerals (THERAPEUTIC MULTIVITAMIN-MINERALS) tablet Take 1 tablet by mouth daily       No current facility-administered medications for this visit. Objective:   He is alert, oriented x 3, pleasant, well nourished, developed and in no acute distress. Temp 96.6 °F (35.9 °C) (Temporal)      Examination of the right shoulder: Active range of motion: Forward flexion 90, external rotation 45, internal rotation to about L4. Passive range of motion: Forward flexion 180 degrees. He reports pain with active and passive range of motion. Examination of the upper extremities are intact with sensation to light touch. Motor testing  5/5 in all major motor groups including hand intrinsics. Radial, Median and Ulnar nerves are intact. Borjas's Sign absent. Examination of the upper extremities shows intact perfusion to all extremities. No cyanosis. Digits are warm to touch. Capillary refill is less than 2 seconds. There is no edema noted.      Examination of the skin over the upper extremities:  Reveals the skin to be intact without lacerations or abrasions. There are no significant erythema, rashes or skin lesions. X Rays: not performed in the office today:     Diagnosis:        ICD-10-CM    1. Traumatic complete tear of right rotator cuff, initial encounter  S46.011A         Assessment/ Plan:      MRI consistent with traumatic complete tear of the right rotator cuff. The natural history of the patient's diagnosis as well as the treatment options were discussed in full and questions were answered. Risks and benefits of the treatment options also reviewed in detail. Follow-up with primary care physician for preop history and physical as already scheduled. Schedule appointment with Dr. Charo Russell this week to discuss proceeding with surgery. He continues on same restrictions x2 weeks. Follow Up:   Call or return to clinic prn if these symptoms worsen or fail to improve as anticipated.

## 2020-10-19 NOTE — LETTER
[facility]  [address] [address]  Phone: [phone]  Fax: [phone]    Kalin Hooper MD        October 19, 2020     Patient: Cherie Alfonso Sr. YOB: 1960   Date of Visit: 10/19/2020       To Whom It May Concern: It is my medical opinion that Cherie Alfonso should be off work extended for another 2 weeks. If you have any questions or concerns, please don't hesitate to call.     Sincerely,           Kalin Hooper MD

## 2020-10-20 ENCOUNTER — TELEPHONE (OUTPATIENT)
Dept: PRIMARY CARE CLINIC | Age: 60
End: 2020-10-20

## 2020-10-20 ENCOUNTER — TELEPHONE (OUTPATIENT)
Dept: ORTHOPEDIC SURGERY | Age: 60
End: 2020-10-20

## 2020-10-20 ENCOUNTER — OFFICE VISIT (OUTPATIENT)
Dept: PRIMARY CARE CLINIC | Age: 60
End: 2020-10-20
Payer: COMMERCIAL

## 2020-10-20 ENCOUNTER — OFFICE VISIT (OUTPATIENT)
Dept: ORTHOPEDIC SURGERY | Age: 60
End: 2020-10-20
Payer: COMMERCIAL

## 2020-10-20 VITALS
HEART RATE: 64 BPM | SYSTOLIC BLOOD PRESSURE: 128 MMHG | DIASTOLIC BLOOD PRESSURE: 80 MMHG | BODY MASS INDEX: 32.59 KG/M2 | OXYGEN SATURATION: 98 % | TEMPERATURE: 97.2 F | HEIGHT: 77 IN | WEIGHT: 276 LBS

## 2020-10-20 VITALS — HEIGHT: 77 IN | BODY MASS INDEX: 32.59 KG/M2 | WEIGHT: 276 LBS | TEMPERATURE: 97 F | RESPIRATION RATE: 16 BRPM

## 2020-10-20 DIAGNOSIS — R00.1 BRADYCARDIA: ICD-10-CM

## 2020-10-20 DIAGNOSIS — Z01.818 PRE-OP EXAM: ICD-10-CM

## 2020-10-20 LAB
A/G RATIO: 1.5 (ref 1.1–2.2)
ALBUMIN SERPL-MCNC: 4.7 G/DL (ref 3.4–5)
ALP BLD-CCNC: 80 U/L (ref 40–129)
ALT SERPL-CCNC: 46 U/L (ref 10–40)
ANION GAP SERPL CALCULATED.3IONS-SCNC: 11 MMOL/L (ref 3–16)
APTT: 34.7 SEC (ref 24.2–36.2)
AST SERPL-CCNC: 46 U/L (ref 15–37)
BILIRUB SERPL-MCNC: 0.9 MG/DL (ref 0–1)
BUN BLDV-MCNC: 14 MG/DL (ref 7–20)
CALCIUM SERPL-MCNC: 9.9 MG/DL (ref 8.3–10.6)
CHLORIDE BLD-SCNC: 98 MMOL/L (ref 99–110)
CO2: 27 MMOL/L (ref 21–32)
CREAT SERPL-MCNC: 0.9 MG/DL (ref 0.9–1.3)
GFR AFRICAN AMERICAN: >60
GFR NON-AFRICAN AMERICAN: >60
GLOBULIN: 3.1 G/DL
GLUCOSE BLD-MCNC: 91 MG/DL (ref 70–99)
HCT VFR BLD CALC: 45.5 % (ref 40.5–52.5)
HEMOGLOBIN: 15.2 G/DL (ref 13.5–17.5)
INR BLD: 1 (ref 0.86–1.14)
MCH RBC QN AUTO: 29.9 PG (ref 26–34)
MCHC RBC AUTO-ENTMCNC: 33.5 G/DL (ref 31–36)
MCV RBC AUTO: 89.2 FL (ref 80–100)
PDW BLD-RTO: 16.2 % (ref 12.4–15.4)
PLATELET # BLD: 242 K/UL (ref 135–450)
PMV BLD AUTO: 8.1 FL (ref 5–10.5)
POTASSIUM SERPL-SCNC: 4.5 MMOL/L (ref 3.5–5.1)
PROTHROMBIN TIME: 11.6 SEC (ref 10–13.2)
RBC # BLD: 5.1 M/UL (ref 4.2–5.9)
SODIUM BLD-SCNC: 136 MMOL/L (ref 136–145)
TOTAL PROTEIN: 7.8 G/DL (ref 6.4–8.2)
TSH SERPL DL<=0.05 MIU/L-ACNC: 3.15 UIU/ML (ref 0.27–4.2)
WBC # BLD: 6 K/UL (ref 4–11)

## 2020-10-20 PROCEDURE — 93000 ELECTROCARDIOGRAM COMPLETE: CPT | Performed by: FAMILY MEDICINE

## 2020-10-20 PROCEDURE — 99212 OFFICE O/P EST SF 10 MIN: CPT | Performed by: ORTHOPAEDIC SURGERY

## 2020-10-20 PROCEDURE — 99242 OFF/OP CONSLTJ NEW/EST SF 20: CPT | Performed by: FAMILY MEDICINE

## 2020-10-20 RX ORDER — METOPROLOL SUCCINATE 25 MG/1
12.5 TABLET, EXTENDED RELEASE ORAL DAILY
Qty: 45 TABLET | Refills: 1 | Status: SHIPPED | OUTPATIENT
Start: 2020-10-20 | End: 2020-10-20 | Stop reason: CLARIF

## 2020-10-20 ASSESSMENT — ENCOUNTER SYMPTOMS
SINUS PRESSURE: 0
COLOR CHANGE: 0
BACK PAIN: 0
ABDOMINAL PAIN: 0
CHOKING: 0
BLOOD IN STOOL: 0
SHORTNESS OF BREATH: 0
CHEST TIGHTNESS: 0
VOICE CHANGE: 0
TROUBLE SWALLOWING: 0
WHEEZING: 0
VOMITING: 0
NAUSEA: 0
EYE DISCHARGE: 0
ANAL BLEEDING: 0
CONSTIPATION: 0
EYE REDNESS: 0
APNEA: 0
SORE THROAT: 0
EYE ITCHING: 0
EYE PAIN: 0
FACIAL SWELLING: 0
PHOTOPHOBIA: 0
COUGH: 0
RECTAL PAIN: 0

## 2020-10-20 NOTE — PROGRESS NOTES
Subjective:      Patient ID: Elizabeth Mckoy Sr. is a 64 y.o. male. HPI 64 y/l male known hypertension,gouty arthritis,hyperlipidemia, and tobacco use who has  Had severe right shoulder pain for 7 months duration not responsive  To non surgical treatment. Work up shows full thickness tear of the right  Supraspinatus tendon and he is scheduled for repair soon per Dr Barbie Young MD, orthopedist.    He denies chest pain or heart disease. No sob or cough. No stroke  Or kidney disease. He does have controlled bp and gouty arthritis. No problems with general anesthesia. No bleeding issues.     Smoker 1/4 ppd cigarettes  No smokeless tobacco    Current Outpatient Medications   Medication Sig Dispense Refill    SERTRALINE HCL PO Take 1 tablet by mouth as needed      indomethacin (INDOCIN) 50 MG capsule Take 1 capsule by mouth 2 times daily (with meals) As needed stop ibuprofen (Patient taking differently: Take 50 mg by mouth as needed As needed stop ibuprofen) 60 capsule 3    tadalafil (CIALIS) 5 MG tablet Take 1 tablet by mouth daily (Patient taking differently: Take 5 mg by mouth as needed ) 90 tablet 1    colchicine (MITIGARE) 0.6 MG capsule Take 1 capsule by mouth daily (Patient taking differently: Take 0.6 mg by mouth as needed ) 30 capsule 2    allopurinol (ZYLOPRIM) 300 MG tablet Take one tab a day & cancel script for allopurinol 100 mg 90 tablet 1    bisoprolol-hydrochlorothiazide (ZIAC) 10-6.25 MG per tablet TAKE ONE TABLET BY MOUTH TWICE A  tablet 1    atorvastatin (LIPITOR) 80 MG tablet TAKE 1 TABLET BY MOUTH ONE TIME A DAY (Patient taking differently: Take 80 mg by mouth nightly TAKE 1 TABLET BY MOUTH ONE TIME A DAY ) 90 tablet 1    Amoxicillin 500 MG TABS Take one tablet 3 times a day for 10 days 30 tablet 0    loratadine (CLARITIN) 10 MG tablet Take 1 tablet by mouth daily (Patient taking differently: Take 10 mg by mouth as needed ) 30 tablet 0    Multiple Vitamins-Minerals Constitutional: Negative for activity change, appetite change, chills, diaphoresis, fatigue, fever and unexpected weight change. HENT: Negative for congestion, dental problem, drooling, ear discharge, ear pain, facial swelling, hearing loss, mouth sores, nosebleeds, postnasal drip, sinus pressure, sneezing, sore throat, tinnitus, trouble swallowing and voice change. Eyes: Negative for photophobia, pain, discharge, redness, itching and visual disturbance. Respiratory: Negative for apnea, cough, choking, chest tightness, shortness of breath and wheezing. Cardiovascular: Negative for chest pain, palpitations and leg swelling. Gastrointestinal: Negative for abdominal pain, anal bleeding, blood in stool, constipation, nausea, rectal pain and vomiting. Endocrine: Negative for cold intolerance, heat intolerance, polydipsia, polyphagia and polyuria. Genitourinary: Negative for decreased urine volume, difficulty urinating, discharge, dysuria, enuresis, flank pain, frequency, hematuria, penile swelling, scrotal swelling, testicular pain and urgency. Musculoskeletal: Negative for arthralgias, back pain, gait problem, joint swelling, myalgias, neck pain and neck stiffness. Right shoulder pain   Skin: Negative for color change, pallor, rash and wound. Allergic/Immunologic: Negative for environmental allergies, food allergies and immunocompromised state. Neurological: Negative for dizziness, tremors, seizures, syncope, facial asymmetry, speech difficulty, weakness, light-headedness, numbness and headaches. Hematological: Negative for adenopathy. Does not bruise/bleed easily. Psychiatric/Behavioral: Negative for agitation, behavioral problems, confusion, decreased concentration, dysphoric mood, hallucinations, self-injury, sleep disturbance and suicidal ideas. The patient is not nervous/anxious and is not hyperactive. Objective:   Physical Exam  Vitals signs and nursing note reviewed. Constitutional:       General: He is not in acute distress. Appearance: He is well-developed. He is not diaphoretic. HENT:      Head: Normocephalic and atraumatic. Right Ear: External ear normal.      Left Ear: External ear normal.      Nose: Nose normal.      Mouth/Throat:      Pharynx: No oropharyngeal exudate. Eyes:      General: No scleral icterus. Right eye: No discharge. Left eye: No discharge. Conjunctiva/sclera: Conjunctivae normal.      Pupils: Pupils are equal, round, and reactive to light. Neck:      Musculoskeletal: Normal range of motion and neck supple. Thyroid: No thyromegaly. Vascular: No JVD. Trachea: No tracheal deviation. Cardiovascular:      Rate and Rhythm: Normal rate and regular rhythm. Pulses:           Carotid pulses are 2+ on the right side and 2+ on the left side. Radial pulses are 2+ on the right side and 2+ on the left side. Femoral pulses are 2+ on the right side and 2+ on the left side. Popliteal pulses are 2+ on the right side and 2+ on the left side. Dorsalis pedis pulses are 2+ on the right side and 2+ on the left side. Posterior tibial pulses are 2+ on the right side and 2+ on the left side. Heart sounds: Normal heart sounds. No murmur. No friction rub. Pulmonary:      Effort: Pulmonary effort is normal. No respiratory distress. Breath sounds: Normal breath sounds. No stridor. No wheezing or rales. Chest:      Chest wall: No tenderness. Abdominal:      General: Bowel sounds are normal. There is no distension. Palpations: Abdomen is soft. There is no mass. Tenderness: There is no abdominal tenderness. There is no guarding or rebound. Musculoskeletal:         General: No tenderness. Comments: Decreased abduction about the right shoulder   Lymphadenopathy:      Cervical: No cervical adenopathy. Skin:     General: Skin is warm and dry.       Coloration: Skin is not pale. Findings: No rash. Neurological:      Mental Status: He is oriented to person, place, and time. Cranial Nerves: No cranial nerve deficit. Motor: No abnormal muscle tone. Coordination: Coordination normal.      Deep Tendon Reflexes: Reflexes normal.   Psychiatric:         Behavior: Behavior normal.         Thought Content: Thought content normal.         Judgment: Judgment normal.         Assessment:     1. Pre-op exam  Medically clear pending ekg, labs  - EKG 12 Lead  - CBC; Future  - PROTIME-INR; Future  - APTT; Future  - Comprehensive Metabolic Panel; Future    2. Traumatic tear of right rotator cuff, unspecified tear extent, subsequent encounter      3. Essential hypertension  bp at goal  < 140/90  - EKG 12 Lead  4. Mixed hyperlipidemia  Cont. statin  5. Gouty arthritis  Stable  Handicap sticker    6. Tobacco abuse disorder  The patient is currently a smoker. Risks of smoking and second hand smoking risks discussed  With patient. Products available for smoking cessation have been discussed with patient. Down to 1/4 ppd  Urged pt to stop    7. Screen for colon cancer    - Cologuard (For External Results Only); Future    8. Screening, lipid    - Lipid, Fasting;  Future        Plan:             EKG shows sinus  Bradycardia rate 58, prominent R wave VI(poss pulmonary disease)    Ck TSH, CXR      He is on beta blocker for bp      10/22/2020    EKG, CXR, Labs reviewed    PATIENT Melany Musa MD

## 2020-10-20 NOTE — LETTER
[facility]  [place] [address] [address] [address]  Phone: [phone]  Fax: [phone]    Nestor Crystal MD        October 20, 2020     Patient: Bin Guo Sr. YOB: 1960   Date of Visit: 10/20/2020       To Whom It May Concern: It is my medical opinion that Bin Guo should remain out of work until after surgery. He will then be out of work for an estimated five months after surgery. If you have any questions or concerns, please don't hesitate to call.     Sincerely,        Nestor Crystal MD

## 2020-10-21 ENCOUNTER — HOSPITAL ENCOUNTER (OUTPATIENT)
Dept: GENERAL RADIOLOGY | Age: 60
Discharge: HOME | End: 2020-10-21
Payer: COMMERCIAL

## 2020-10-21 ENCOUNTER — HOSPITAL ENCOUNTER (OUTPATIENT)
Age: 60
Discharge: HOME | End: 2020-10-21
Payer: COMMERCIAL

## 2020-10-21 PROCEDURE — 71046 X-RAY EXAM CHEST 2 VIEWS: CPT

## 2020-10-22 ENCOUNTER — TELEPHONE (OUTPATIENT)
Dept: PRIMARY CARE CLINIC | Age: 60
End: 2020-10-22

## 2020-10-30 ENCOUNTER — TELEPHONE (OUTPATIENT)
Dept: ORTHOPEDIC SURGERY | Age: 60
End: 2020-10-30

## 2020-10-30 NOTE — TELEPHONE ENCOUNTER
General Question     Subject: PREOP  Patient and /or Facility Request: NEEDS TO KNOW WHAT TIME HE NEEDS TO COME IN BEFORE SURGERY FOR COVID SCREENING  Contact Number: [phone]

## 2020-11-02 ENCOUNTER — OFFICE VISIT (OUTPATIENT)
Dept: PRIMARY CARE CLINIC | Age: 60
End: 2020-11-02
Payer: COMMERCIAL

## 2020-11-02 PROCEDURE — 99211 OFF/OP EST MAY X REQ PHY/QHP: CPT | Performed by: NURSE PRACTITIONER

## 2020-11-02 NOTE — PROGRESS NOTES
Patient presented to [facility] drive up clinic for preop testing. Patient was swabbed and given information advising them to remain isolated until procedure date.

## 2020-11-03 LAB — SARS-COV-2: NORMAL

## 2020-11-04 NOTE — PROGRESS NOTES
Patient's specimen from his 11/2/2020 covid-test is invalid-patient will need a rapid covid test selma-juan tavares rn-manager-pre/post.pacu endo and pat

## 2020-11-04 NOTE — PROGRESS NOTES
[address] [facility] time_0850___________        Surgery time___1050_________    Take the following medications with a sip of water: Follow your MD/Surgeons pre-procedure instructions regarding your medications    Do not eat or drink anything after 12:00 midnight prior to your surgery. This includes water chewing gum, mints and ice chips. You may brush your teeth and gargle the morning of your surgery, but do not swallow the water     Please see your family doctor/pediatrician for a history and physical and/or concerning medications. Bring any test results/reports from your physicians office. If you are under the care of a heart doctor or specialist doctor, please be aware that you may be asked to them for clearance    You may be asked to stop blood thinners such as Coumadin, Plavix, Fragmin, Lovenox, etc., or any anti-inflammatories such as:  Aspirin, Ibuprofen, Advil, Naproxen prior to your surgery. We also ask that you stop any OTC medications such as fish oil, vitamin E, glucosamine, garlic, Multivitamins, COQ 10, etc. May take tylenol-stop indocin    We ask that you do not smoke 24 hours prior to surgery  We ask that you do not  drink any alcoholic beverages 24 hours prior to surgery     You must make arrangements for a responsible adult to take you home after your surgery. For your safety you will not be allowed to leave alone or drive yourself home. Your surgery will be cancelled if you do not have a ride home. Also for your safety, it is strongly suggested that someone stay with you the first 24 hours after your surgery. A parent or legal guardian must accompany a child scheduled for surgery and plan to stay at the hospital until the child is discharged. Please do not bring other children with you. For your comfort, please wear simple loose fitting clothing to the hospital.  Please do not bring valuables.     Do not wear any make-up or nail polish on your fingers or toes      For your safety, please do not wear any jewelry or body piercing's on the day of surgery. All jewelry must be removed. If you have dentures, they will be removed before going to operating room. For your convenience, we will provide you with a container. If you wear contact lenses or glasses, they will be removed, please bring a case for them. If you have a living will and a durable power of  for healthcare, please bring in a copy. As part of our patient safety program to minimize surgical site infections, we ask you to do the following:    · Please notify your surgeon if you develop any illness between         now and the  day of your surgery. · This includes a cough, cold, fever, sore throat, nausea,         or vomiting, and diarrhea, etc.  ·  Please notify your surgeon if you experience dizziness, shortness         of breath or blurred vision between now and the time of your surgery. Do not shave your operative site 96 hours prior to surgery. For face and neck surgery, men may use an electric razor 48 hours   prior to surgery. You may shower the night before surgery or the morning of   your surgery with an antibacterial soap. You will need to bring a photo ID and insurance card    [facility] has an onsite pharmacy, would you like to utilize our pharmacy     If you will be staying overnight and use a C-pap machine, please bring   your C-pap to hospital     Our goal is to provide you with excellent care, therefore, visitors will be limited to two(2) in the room at a time so that we may focus on providing this care for you. Please contact pre-admission testing if you have any further questions.                  [facility] phone number:  [phone] PAT fax number:  [phone]  Please note these are generalized instructions for all surgical cases, you may be provided with more specific instructions according to your surgery.

## 2020-11-05 ENCOUNTER — ANESTHESIA EVENT (OUTPATIENT)
Dept: OPERATING ROOM | Age: 60
End: 2020-11-05
Payer: COMMERCIAL

## 2020-11-06 ENCOUNTER — ANESTHESIA (OUTPATIENT)
Dept: OPERATING ROOM | Age: 60
End: 2020-11-06
Payer: COMMERCIAL

## 2020-11-06 ENCOUNTER — HOSPITAL ENCOUNTER (OUTPATIENT)
Age: 60
Setting detail: OUTPATIENT SURGERY
Discharge: HOME | End: 2020-11-06
Attending: ORTHOPAEDIC SURGERY | Admitting: ORTHOPAEDIC SURGERY
Payer: COMMERCIAL

## 2020-11-06 VITALS
RESPIRATION RATE: 2 BRPM | SYSTOLIC BLOOD PRESSURE: 117 MMHG | DIASTOLIC BLOOD PRESSURE: 85 MMHG | TEMPERATURE: 94.5 F | OXYGEN SATURATION: 91 %

## 2020-11-06 VITALS
HEART RATE: 50 BPM | BODY MASS INDEX: 31.76 KG/M2 | DIASTOLIC BLOOD PRESSURE: 84 MMHG | OXYGEN SATURATION: 98 % | WEIGHT: 268.96 LBS | RESPIRATION RATE: 18 BRPM | TEMPERATURE: 97 F | SYSTOLIC BLOOD PRESSURE: 140 MMHG | HEIGHT: 77 IN

## 2020-11-06 PROCEDURE — 2720000010 HC SURG SUPPLY STERILE: Performed by: ORTHOPAEDIC SURGERY

## 2020-11-06 PROCEDURE — 3600000004 HC SURGERY LEVEL 4 BASE: Performed by: ORTHOPAEDIC SURGERY

## 2020-11-06 PROCEDURE — 2580000003 HC RX 258: Performed by: ORTHOPAEDIC SURGERY

## 2020-11-06 PROCEDURE — 3700000001 HC ADD 15 MINUTES (ANESTHESIA): Performed by: ORTHOPAEDIC SURGERY

## 2020-11-06 PROCEDURE — 29826 SHO ARTHRS SRG DECOMPRESSION: CPT | Performed by: ORTHOPAEDIC SURGERY

## 2020-11-06 PROCEDURE — 6360000002 HC RX W HCPCS: Performed by: NURSE ANESTHETIST, CERTIFIED REGISTERED

## 2020-11-06 PROCEDURE — 2580000003 HC RX 258: Performed by: ANESTHESIOLOGY

## 2020-11-06 PROCEDURE — 29828 SHO ARTHRS SRG BICP TENODSIS: CPT | Performed by: ORTHOPAEDIC SURGERY

## 2020-11-06 PROCEDURE — 2500000003 HC RX 250 WO HCPCS: Performed by: NURSE ANESTHETIST, CERTIFIED REGISTERED

## 2020-11-06 PROCEDURE — 7100000001 HC PACU RECOVERY - ADDTL 15 MIN: Performed by: ORTHOPAEDIC SURGERY

## 2020-11-06 PROCEDURE — 3700000000 HC ANESTHESIA ATTENDED CARE: Performed by: ORTHOPAEDIC SURGERY

## 2020-11-06 PROCEDURE — 7100000000 HC PACU RECOVERY - FIRST 15 MIN: Performed by: ORTHOPAEDIC SURGERY

## 2020-11-06 PROCEDURE — 6360000002 HC RX W HCPCS: Performed by: ORTHOPAEDIC SURGERY

## 2020-11-06 PROCEDURE — L3809 WHFO W/O JOINTS PRE OTS: HCPCS | Performed by: ORTHOPAEDIC SURGERY

## 2020-11-06 PROCEDURE — 3600000014 HC SURGERY LEVEL 4 ADDTL 15MIN: Performed by: ORTHOPAEDIC SURGERY

## 2020-11-06 PROCEDURE — 6360000002 HC RX W HCPCS: Performed by: ANESTHESIOLOGY

## 2020-11-06 PROCEDURE — 7100000011 HC PHASE II RECOVERY - ADDTL 15 MIN: Performed by: ORTHOPAEDIC SURGERY

## 2020-11-06 PROCEDURE — 2709999900 HC NON-CHARGEABLE SUPPLY: Performed by: ORTHOPAEDIC SURGERY

## 2020-11-06 PROCEDURE — 29827 SHO ARTHRS SRG RT8TR CUF RPR: CPT | Performed by: ORTHOPAEDIC SURGERY

## 2020-11-06 PROCEDURE — 2500000003 HC RX 250 WO HCPCS: Performed by: ANESTHESIOLOGY

## 2020-11-06 PROCEDURE — C1713 ANCHOR/SCREW BN/BN,TIS/BN: HCPCS | Performed by: ORTHOPAEDIC SURGERY

## 2020-11-06 PROCEDURE — 64415 NJX AA&/STRD BRCH PLXS IMG: CPT | Performed by: ANESTHESIOLOGY

## 2020-11-06 PROCEDURE — 7100000010 HC PHASE II RECOVERY - FIRST 15 MIN: Performed by: ORTHOPAEDIC SURGERY

## 2020-11-06 RX ORDER — ROCURONIUM BROMIDE 10 MG/ML
INJECTION, SOLUTION INTRAVENOUS PRN
Status: DISCONTINUED | OUTPATIENT
Start: 2020-11-06 | End: 2020-11-06 | Stop reason: SDUPTHER

## 2020-11-06 RX ORDER — LIDOCAINE HYDROCHLORIDE 20 MG/ML
INJECTION, SOLUTION EPIDURAL; INFILTRATION; INTRACAUDAL; PERINEURAL PRN
Status: DISCONTINUED | OUTPATIENT
Start: 2020-11-06 | End: 2020-11-06 | Stop reason: SDUPTHER

## 2020-11-06 RX ORDER — PHENYLEPHRINE HCL IN 0.9% NACL 1 MG/10 ML
SYRINGE (ML) INTRAVENOUS PRN
Status: DISCONTINUED | OUTPATIENT
Start: 2020-11-06 | End: 2020-11-06 | Stop reason: SDUPTHER

## 2020-11-06 RX ORDER — POVIDONE-IODINE 10 MG/G
OINTMENT TOPICAL
Status: COMPLETED | OUTPATIENT
Start: 2020-11-06 | End: 2020-11-06

## 2020-11-06 RX ORDER — ONDANSETRON 2 MG/ML
4 INJECTION INTRAMUSCULAR; INTRAVENOUS
Status: DISCONTINUED | OUTPATIENT
Start: 2020-11-06 | End: 2020-11-06 | Stop reason: HOSPADM

## 2020-11-06 RX ORDER — SODIUM CHLORIDE 0.9 % (FLUSH) 0.9 %
10 SYRINGE (ML) INJECTION PRN
Status: DISCONTINUED | OUTPATIENT
Start: 2020-11-06 | End: 2020-11-06 | Stop reason: HOSPADM

## 2020-11-06 RX ORDER — SODIUM CHLORIDE 0.9 % (FLUSH) 0.9 %
10 SYRINGE (ML) INJECTION EVERY 12 HOURS SCHEDULED
Status: DISCONTINUED | OUTPATIENT
Start: 2020-11-06 | End: 2020-11-06 | Stop reason: HOSPADM

## 2020-11-06 RX ORDER — HYDROCODONE BITARTRATE AND ACETAMINOPHEN 5; 325 MG/1; MG/1
1 TABLET ORAL PRN
Status: DISCONTINUED | OUTPATIENT
Start: 2020-11-06 | End: 2020-11-06 | Stop reason: HOSPADM

## 2020-11-06 RX ORDER — SUCCINYLCHOLINE/SOD CL,ISO/PF 200MG/10ML
SYRINGE (ML) INTRAVENOUS PRN
Status: DISCONTINUED | OUTPATIENT
Start: 2020-11-06 | End: 2020-11-06 | Stop reason: SDUPTHER

## 2020-11-06 RX ORDER — HYDRALAZINE HYDROCHLORIDE 20 MG/ML
5 INJECTION INTRAMUSCULAR; INTRAVENOUS EVERY 10 MIN PRN
Status: DISCONTINUED | OUTPATIENT
Start: 2020-11-06 | End: 2020-11-06 | Stop reason: HOSPADM

## 2020-11-06 RX ORDER — FENTANYL CITRATE 50 UG/ML
25 INJECTION, SOLUTION INTRAMUSCULAR; INTRAVENOUS EVERY 5 MIN PRN
Status: DISCONTINUED | OUTPATIENT
Start: 2020-11-06 | End: 2020-11-06 | Stop reason: HOSPADM

## 2020-11-06 RX ORDER — MAGNESIUM HYDROXIDE 1200 MG/15ML
LIQUID ORAL CONTINUOUS PRN
Status: COMPLETED | OUTPATIENT
Start: 2020-11-06 | End: 2020-11-06

## 2020-11-06 RX ORDER — MIDAZOLAM HYDROCHLORIDE 1 MG/ML
INJECTION INTRAMUSCULAR; INTRAVENOUS PRN
Status: DISCONTINUED | OUTPATIENT
Start: 2020-11-06 | End: 2020-11-06 | Stop reason: SDUPTHER

## 2020-11-06 RX ORDER — DEXAMETHASONE SODIUM PHOSPHATE 4 MG/ML
INJECTION, SOLUTION INTRA-ARTICULAR; INTRALESIONAL; INTRAMUSCULAR; INTRAVENOUS; SOFT TISSUE PRN
Status: DISCONTINUED | OUTPATIENT
Start: 2020-11-06 | End: 2020-11-06 | Stop reason: SDUPTHER

## 2020-11-06 RX ORDER — FENTANYL CITRATE 50 UG/ML
INJECTION, SOLUTION INTRAMUSCULAR; INTRAVENOUS PRN
Status: DISCONTINUED | OUTPATIENT
Start: 2020-11-06 | End: 2020-11-06 | Stop reason: SDUPTHER

## 2020-11-06 RX ORDER — HYDROCODONE BITARTRATE AND ACETAMINOPHEN 5; 325 MG/1; MG/1
2 TABLET ORAL PRN
Status: DISCONTINUED | OUTPATIENT
Start: 2020-11-06 | End: 2020-11-06 | Stop reason: HOSPADM

## 2020-11-06 RX ORDER — PROPOFOL 10 MG/ML
INJECTION, EMULSION INTRAVENOUS PRN
Status: DISCONTINUED | OUTPATIENT
Start: 2020-11-06 | End: 2020-11-06 | Stop reason: SDUPTHER

## 2020-11-06 RX ORDER — SODIUM CHLORIDE 9 MG/ML
INJECTION, SOLUTION INTRAVENOUS CONTINUOUS
Status: DISCONTINUED | OUTPATIENT
Start: 2020-11-06 | End: 2020-11-06 | Stop reason: HOSPADM

## 2020-11-06 RX ORDER — OXYCODONE HYDROCHLORIDE AND ACETAMINOPHEN 5; 325 MG/1; MG/1
1 TABLET ORAL EVERY 4 HOURS PRN
Qty: 40 TABLET | Refills: 0 | Status: SHIPPED | OUTPATIENT
Start: 2020-11-06 | End: 2020-11-13

## 2020-11-06 RX ORDER — BUPIVACAINE HYDROCHLORIDE 5 MG/ML
INJECTION, SOLUTION EPIDURAL; INTRACAUDAL
Status: COMPLETED | OUTPATIENT
Start: 2020-11-06 | End: 2020-11-06

## 2020-11-06 RX ORDER — PROMETHAZINE HYDROCHLORIDE 25 MG/ML
6.25 INJECTION, SOLUTION INTRAMUSCULAR; INTRAVENOUS
Status: DISCONTINUED | OUTPATIENT
Start: 2020-11-06 | End: 2020-11-06 | Stop reason: HOSPADM

## 2020-11-06 RX ORDER — FENTANYL CITRATE 50 UG/ML
50 INJECTION, SOLUTION INTRAMUSCULAR; INTRAVENOUS EVERY 5 MIN PRN
Status: DISCONTINUED | OUTPATIENT
Start: 2020-11-06 | End: 2020-11-06 | Stop reason: HOSPADM

## 2020-11-06 RX ORDER — ONDANSETRON 2 MG/ML
INJECTION INTRAMUSCULAR; INTRAVENOUS PRN
Status: DISCONTINUED | OUTPATIENT
Start: 2020-11-06 | End: 2020-11-06 | Stop reason: SDUPTHER

## 2020-11-06 RX ORDER — GLYCOPYRROLATE 0.2 MG/ML
INJECTION INTRAMUSCULAR; INTRAVENOUS PRN
Status: DISCONTINUED | OUTPATIENT
Start: 2020-11-06 | End: 2020-11-06 | Stop reason: SDUPTHER

## 2020-11-06 RX ORDER — MEPERIDINE HYDROCHLORIDE 25 MG/ML
12.5 INJECTION INTRAMUSCULAR; INTRAVENOUS; SUBCUTANEOUS
Status: DISCONTINUED | OUTPATIENT
Start: 2020-11-06 | End: 2020-11-06 | Stop reason: HOSPADM

## 2020-11-06 RX ORDER — PROMETHAZINE HYDROCHLORIDE 25 MG/1
25 TABLET ORAL EVERY 6 HOURS PRN
Qty: 5 TABLET | Refills: 0 | Status: SHIPPED | OUTPATIENT
Start: 2020-11-06 | End: 2022-05-23

## 2020-11-06 RX ADMIN — Medication 180 MG: at 10:08

## 2020-11-06 RX ADMIN — BUPIVACAINE HYDROCHLORIDE 30 ML: 5 INJECTION, SOLUTION EPIDURAL; INTRACAUDAL; PERINEURAL at 09:51

## 2020-11-06 RX ADMIN — LIDOCAINE HYDROCHLORIDE 100 MG: 20 INJECTION, SOLUTION EPIDURAL; INFILTRATION; INTRACAUDAL; PERINEURAL at 10:07

## 2020-11-06 RX ADMIN — MIDAZOLAM 2 MG: 1 INJECTION INTRAMUSCULAR; INTRAVENOUS at 09:52

## 2020-11-06 RX ADMIN — FENTANYL CITRATE 100 MCG: 50 INJECTION INTRAMUSCULAR; INTRAVENOUS at 09:52

## 2020-11-06 RX ADMIN — DEXAMETHASONE SODIUM PHOSPHATE 4 MG: 4 INJECTION, SOLUTION INTRAMUSCULAR; INTRAVENOUS at 10:16

## 2020-11-06 RX ADMIN — PROPOFOL 260 MG: 10 INJECTION, EMULSION INTRAVENOUS at 10:07

## 2020-11-06 RX ADMIN — Medication 50 MCG: at 10:29

## 2020-11-06 RX ADMIN — SODIUM CHLORIDE: 9 INJECTION, SOLUTION INTRAVENOUS at 08:33

## 2020-11-06 RX ADMIN — ONDANSETRON 4 MG: 2 INJECTION INTRAMUSCULAR; INTRAVENOUS at 10:16

## 2020-11-06 RX ADMIN — GLYCOPYRROLATE 0.2 MG: 0.2 INJECTION, SOLUTION INTRAMUSCULAR; INTRAVENOUS at 10:19

## 2020-11-06 RX ADMIN — Medication 50 MCG: at 10:39

## 2020-11-06 RX ADMIN — Medication 3 G: at 10:01

## 2020-11-06 RX ADMIN — ROCURONIUM BROMIDE 5 MG: 10 INJECTION INTRAVENOUS at 10:07

## 2020-11-06 ASSESSMENT — PULMONARY FUNCTION TESTS
PIF_VALUE: 22
PIF_VALUE: 2
PIF_VALUE: 22
PIF_VALUE: 22
PIF_VALUE: 3
PIF_VALUE: 22
PIF_VALUE: 2
PIF_VALUE: 7
PIF_VALUE: 22
PIF_VALUE: 0
PIF_VALUE: 9
PIF_VALUE: 22
PIF_VALUE: 23
PIF_VALUE: 22
PIF_VALUE: 22
PIF_VALUE: 2
PIF_VALUE: 22
PIF_VALUE: 4
PIF_VALUE: 22
PIF_VALUE: 25
PIF_VALUE: 11
PIF_VALUE: 22
PIF_VALUE: 22
PIF_VALUE: 15
PIF_VALUE: 22
PIF_VALUE: 2
PIF_VALUE: 22
PIF_VALUE: 0
PIF_VALUE: 22
PIF_VALUE: 23
PIF_VALUE: 22
PIF_VALUE: 23
PIF_VALUE: 22
PIF_VALUE: 22
PIF_VALUE: 8
PIF_VALUE: 1
PIF_VALUE: 1
PIF_VALUE: 22
PIF_VALUE: 19
PIF_VALUE: 23
PIF_VALUE: 19
PIF_VALUE: 42
PIF_VALUE: 31
PIF_VALUE: 16
PIF_VALUE: 22
PIF_VALUE: 21
PIF_VALUE: 0
PIF_VALUE: 22
PIF_VALUE: 9
PIF_VALUE: 22
PIF_VALUE: 24
PIF_VALUE: 3
PIF_VALUE: 1
PIF_VALUE: 22

## 2020-11-06 ASSESSMENT — PAIN SCALES - GENERAL
PAINLEVEL_OUTOF10: 0

## 2020-11-06 ASSESSMENT — LIFESTYLE VARIABLES: SMOKING_STATUS: 0

## 2020-11-06 ASSESSMENT — PAIN - FUNCTIONAL ASSESSMENT: PAIN_FUNCTIONAL_ASSESSMENT: 0-10

## 2020-11-06 NOTE — BRIEF OP NOTE
Brief Postoperative Note      Patient: Jeovany Mahan Sr.   YOB: 1960  MRN: [id number]    Date of Procedure: 11/6/2020    Pre-Op Diagnosis: RIGHT SHOULDER ROTATOR CUFF TEAR    Post-Op Diagnosis: Same + type 2 SLAP tear and loose bodies       Procedure(s):  RIGHT SHOULDER ARTHROSCOPY, SUBACROMIAL DECOMPRESSION, ROTATOR CUFF REPAIR AND BICEPS TENDONISIS    Surgeon(s):  Hazel Esquivel MD    Assistant:  First Assistant: Trent Salazar RN    Anesthesia: General    Estimated Blood Loss (mL): Minimal    Complications: None    Specimens:   * No specimens in log *    Implants:  * No implants in log *      Drains: * No LDAs found *        Electronically signed by Hazel Esquivel MD on 11/6/2020 at 11:00 AM

## 2020-11-06 NOTE — PROGRESS NOTES
Pt arrived to PACU from OR. Pt sleeping on 3l/nc. Right shoulder dressing C/D/I. Polar pack applied. +pulses noted.  VSS

## 2020-11-06 NOTE — ANESTHESIA PROCEDURE NOTES
Peripheral Block    Patient location during procedure: pre-op  Staffing  Performed: anesthesiologist   Preanesthetic Checklist  Completed: patient identified, site marked, surgical consent, pre-op evaluation, timeout performed, IV checked, risks and benefits discussed, monitors and equipment checked, anesthesia consent given, oxygen available and patient being monitored  Peripheral Block  Patient position: sitting  Prep: ChloraPrep  Patient monitoring: cardiac monitor, continuous pulse ox, frequent blood pressure checks and IV access  Block type: Brachial plexus  Laterality: right  Injection technique: single-shot  Procedures: ultrasound guided  Interscalene  Provider prep: mask and sterile gloves  Needle  Needle gauge: 21 G  Needle length: 10 cm  Needle localization: ultrasound guidance  Assessment  Injection assessment: negative aspiration for heme, no paresthesia on injection and local visualized surrounding nerve on ultrasound  Paresthesia pain: none  Slow fractionated injection: yes  Hemodynamics: stable  Additional Notes  Immediately prior to procedure a \"time out\" was called to verify the correct patient, allergies, laterality, procedure and equipment. Time out performed with Raffaele Casas RN    Local Anesthetic: 0.5 %  Bupivacaine   Amount: 30 ml  in 5 ml increments after negative aspiration each time. Anterior scalene and middle scalene muscles, upper, middle and lower trunks of the brachial plexus are identified, the tip of the needle and the spread of the local anesthetic around the brachial plexus are visualized. The Brachial plexus appeared to be anatomically normal and there were no abnormal pathologically findings seen.          Medications Administered  Bupivacaine (MARCAINE) PF injection 0.5%, 30 mL  Reason for block: post-op pain management and at surgeon's request

## 2020-11-06 NOTE — PROGRESS NOTES
CLINICAL PHARMACY NOTE: [facility] Patient?: No  Total # of Prescriptions Filled: 2   The following medications were delivered to the patient:  Discharge Medication List as of 11/6/2020 11:56 AM      START taking these medications    Details   oxyCODONE-acetaminophen (PERCOCET) 5-325 MG per tablet Take 1 tablet by mouth every 4 hours as needed for Pain for up to 7 days. , Disp-40 tablet,R-0Print      promethazine (PHENERGAN) 25 MG tablet Take 1 tablet by mouth every 6 hours as needed for Nausea, Disp-5 tablet,R-0Print         ·   ·   Total # of Interventions Completed: 0  Time Spent (min): 30    Additional Documentation:

## 2020-11-06 NOTE — ANESTHESIA PRE PROCEDURE
[facility] Department of Anesthesiology  Pre-Anesthesia Evaluation/Consultation       Name:  Marisela Jarrett Sr.  : 1960  Age:  64 y.o. MRN:  [id number]  Date: 2020           Surgeon: Surgeon(s):  Brooks Tripathi MD    Procedure: Procedure(s):  RIGHT SHOULDER ARTHROSCOPY, SUBACROMIAL DECOMPRESSION, ROTATOR CUFF REPAIR AND INDICATED PROCEDURES     No Known Allergies  Patient Active Problem List   Diagnosis    Knee pain    HTN (hypertension)    Anxiety    Hyperlipemia    Erectile dysfunction    Gout    Sinusitis, acute    Primary osteoarthritis of both knees    Contusion of right knee    Contusion of left knee    Acute pain of right shoulder    Rotator cuff strain, right, initial encounter    Traumatic complete tear of right rotator cuff     Past Medical History:   Diagnosis Date    Anxiety     Gout     Hyperlipidemia     Hypertension     Knee pain      Past Surgical History:   Procedure Laterality Date    FINGER FRACTURE SURGERY Left     2 MIDDLE FINGERS    KNEE ARTHROSCOPY  right knee     Social History     Tobacco Use    Smoking status: Light Tobacco Smoker     Packs/day: 0.25     Years: 8.00     Pack years: 2.00     Types: Cigarettes    Smokeless tobacco: Never Used   Substance Use Topics    Alcohol use: Yes     Alcohol/week: 0.0 standard drinks     Comment: socially    Drug use: Never     Medications  No current facility-administered medications on file prior to encounter.       Current Outpatient Medications on File Prior to Encounter   Medication Sig Dispense Refill    indomethacin (INDOCIN) 50 MG capsule Take 1 capsule by mouth 2 times daily (with meals) As needed stop ibuprofen (Patient taking differently: Take 50 mg by mouth as needed As needed stop ibuprofen) 60 capsule 3    colchicine (MITIGARE) 0.6 MG capsule Take 1 capsule by mouth daily (Patient taking differently: Take 0.6 mg by mouth as needed ) 30 capsule 2    allopurinol (ZYLOPRIM) 300 MG tablet Take one tab a day & cancel script for allopurinol 100 mg (Patient taking differently: Take 300 mg by mouth daily Take one tab a day & cancel script for allopurinol 100 mg) 90 tablet 1    bisoprolol-hydrochlorothiazide (ZIAC) 10-6.25 MG per tablet TAKE ONE TABLET BY MOUTH TWICE A  tablet 1    atorvastatin (LIPITOR) 80 MG tablet TAKE 1 TABLET BY MOUTH ONE TIME A DAY (Patient taking differently: Take 80 mg by mouth nightly TAKE 1 TABLET BY MOUTH ONE TIME A DAY ) 90 tablet 1    loratadine (CLARITIN) 10 MG tablet Take 1 tablet by mouth daily (Patient taking differently: Take 10 mg by mouth as needed ) 30 tablet 0    tadalafil (CIALIS) 5 MG tablet Take 1 tablet by mouth daily (Patient taking differently: Take 5 mg by mouth as needed ) 90 tablet 1     Current Facility-Administered Medications   Medication Dose Route Frequency Provider Last Rate Last Dose    0.9 % sodium chloride infusion   Intravenous Continuous Pelon Clinton MD        sodium chloride flush 0.9 % injection 10 mL  10 mL Intravenous 2 times per day Pelon Clinton MD        sodium chloride flush 0.9 % injection 10 mL  10 mL Intravenous PRN Pelon Clinton MD        ceFAZolin (ANCEF) 3 g in dextrose 5 % 100 mL IVPB  3 g Intravenous Once Isael Tracey MD         Vital Signs (Current)   Vitals:    20 1553 20 0800 20 08   BP:   131/84   Pulse:   59   Resp:   14   Temp:   98.2 °F (36.8 °C)   SpO2:   (!) 59%   Weight: 270 lb (122.5 kg) 268 lb 15.4 oz (122 kg)    Height: 6' 5\" (1.956 m)                                            BP Readings from Last 3 Encounters:   20 131/84   10/20/20 128/80   19 137/86     Vital Signs Statistics (for past 48 hrs)     Temp  Av.2 °F (36.8 °C)  Min: 98.2 °F (36.8 °C)   Min taken time: 20  Max: 98.2 °F (36.8 °C)   Max taken time: 20  Pulse  Av  Min: 61   Min taken time: 20 0831  Max: 61   Max taken time: 20  Resp  Av  Min: 15   Min taken time: 20  Max: 15   Max taken time: 20  BP  Min: 131/84   Min taken time: 20  Max: 131/84   Max taken time: 20  SpO2  Av %  Min: 59 %   Min taken time: 20  Max: 59 %   Max taken time: 20  BP Readings from Last 3 Encounters:   20 131/84   10/20/20 128/80   19 137/86       BMI  Body mass index is 31.89 kg/m². Estimated body mass index is 31.89 kg/m² as calculated from the following:    Height as of this encounter: 6' 5\" (1.956 m). Weight as of this encounter: 268 lb 15.4 oz (122 kg). CBC   Lab Results   Component Value Date    WBC 6.0 10/20/2020    RBC 5.10 10/20/2020    HGB 15.2 10/20/2020    HCT 45.5 10/20/2020    MCV 89.2 10/20/2020    RDW 16.2 10/20/2020     10/20/2020     CMP    Lab Results   Component Value Date     10/20/2020    K 4.5 10/20/2020    CL 98 10/20/2020    CO2 27 10/20/2020    BUN 14 10/20/2020    CREATININE 0.9 10/20/2020    GFRAA >60 10/20/2020    GFRAA >60 2013    AGRATIO 1.5 10/20/2020    LABGLOM >60 10/20/2020    GLUCOSE 91 10/20/2020    PROT 7.8 10/20/2020    PROT 7.6 2013    CALCIUM 9.9 10/20/2020    BILITOT 0.9 10/20/2020    ALKPHOS 80 10/20/2020    AST 46 10/20/2020    ALT 46 10/20/2020     BMP    Lab Results   Component Value Date     10/20/2020    K 4.5 10/20/2020    CL 98 10/20/2020    CO2 27 10/20/2020    BUN 14 10/20/2020    CREATININE 0.9 10/20/2020    CALCIUM 9.9 10/20/2020    GFRAA >60 10/20/2020    GFRAA >60 2013    LABGLOM >60 10/20/2020    GLUCOSE 91 10/20/2020     POCGlucose  No results for input(s): GLUCOSE in the last 72 hours.    [facility]    Lab Results   Component Value Date    PROTIME 11.6 10/20/2020    INR 1.00 10/20/2020    APTT 34.7      HCG (If Applicable) No results found for: PREGTESTUR, PREGSERUM, HCG, HCGQUANT   ABGs No results found for: PHART, PO2ART, KNN5BLU, GFZ5RIH, BEART, V8OTSZJH   Type & Screen (If Applicable)  No results found for: LABABO, LABRH                         BMI: Wt Readings from Last 3 Encounters:       NPO Status:   Date of last liquid consumption: 11/05/20   Time of last liquid consumption: 2100   Date of last solid food consumption: 11/05/20      Time of last solid consumption: 2100       Anesthesia Evaluation  Patient summary reviewed no history of anesthetic complications:   Airway: Mallampati: II  TM distance: >3 FB   Neck ROM: full  Mouth opening: > = 3 FB Dental: normal exam         Pulmonary: breath sounds clear to auscultation      (-) COPD, asthma, sleep apnea and not a current smoker                           Cardiovascular:  Exercise tolerance: good (>4 METS),   (+) hypertension:, hyperlipidemia    (-) past MI, CABG/stent and  angina      Rhythm: regular  Rate: normal                    Neuro/Psych:      (-) seizures, TIA, CVA and depression/anxiety            GI/Hepatic/Renal:        (-) PUD and liver disease       Endo/Other:    (+) : arthritis: OA., .    (-) diabetes mellitus, hypothyroidism               Abdominal:           Vascular:     - DVT and PE. Anesthesia Plan      general and regional     ASA 2       Induction: intravenous. MIPS: Postoperative opioids intended and Prophylactic antiemetics administered. Anesthetic plan and risks discussed with patient. Plan discussed with CRNA. This pre-anesthesia assessment may be used as a history and physical.    DOS STAFF ADDENDUM:    Pt seen and examined, chart reviewed (including anesthesia, drug and allergy history). No interval changes to history and physical examination. Anesthetic plan, risks, benefits, alternatives, and personnel involved discussed with patient. Patient verbalized an understanding and agrees to proceed.       Domonique Gallego MD  November 6, 2020  8:33 AM

## 2020-11-06 NOTE — ANESTHESIA POSTPROCEDURE EVALUATION
Department of Anesthesiology  Postprocedure Note    Patient: Katya Lane Sr. MRN: [id number]  YOB: 1960  Date of evaluation: 11/6/2020  Time:  11:39 AM     Procedure Summary     Date:  11/06/20 Room / Location:  [address] / [facility]    Anesthesia Start:  1050 Anesthesia Stop:  3967    Procedure:  RIGHT SHOULDER ARTHROSCOPY, SUBACROMIAL DECOMPRESSION, ROTATOR CUFF REPAIR AND BICEPS TENDONISIS (Right ) Diagnosis:       Traumatic tear of right rotator cuff, initial encounter      (RIGHT SHOULDER ROTATOR CUFF TEAR)    Surgeon:  Crystal Soriano MD Responsible Provider:  Chris Gorman MD    Anesthesia Type:  general, regional ASA Status:  2          Anesthesia Type: general, regional    Stef Phase I: Stef Score: 8    Stef Phase II:      Last vitals: Reviewed and per EMR flowsheets.        Anesthesia Post Evaluation    Patient location during evaluation: PACU  Patient participation: complete - patient participated  Level of consciousness: awake and alert  Pain score: 1  Airway patency: patent  Nausea & Vomiting: no nausea and no vomiting  Complications: no  Cardiovascular status: blood pressure returned to baseline  Respiratory status: acceptable  Hydration status: euvolemic

## 2020-11-06 NOTE — PROGRESS NOTES
Assisted up to chair. No complaints. Discharge instructions given, verbal and written. Verbalize understanding. Awaiting scripts from pharmacy and ride.

## 2020-11-06 NOTE — PROGRESS NOTES
Received from PACU. Admitted to Phase 2 care. Awake and alert, respirations easy and even. Oriented to room and surroundings. No complaints. Able to move right hand and feel nurses hand on his.

## 2020-11-07 NOTE — OP NOTE
room table  in supine position. He was induced under general anesthesia. He  received prophylactic preoperative IV antibiotics. He had DVT  prophylaxis with sequential compression devices on his bilateral lower  extremities. He was then placed in the lateral decubitus position with  his right side up. An axillary roll was placed. Care was taken to  ensure that all bony prominences were well padded. His right arm was  placed in the arthroscopic arm moreland. His right shoulder was then  sterilely prepped and draped in the usual fashion. A time-out was taken  where the patient, the operative extremity, and the operative procedure  were once again verified. We then created a standard posterior arthroscopy portal.  The  arthroscope was inserted into the glenohumeral joint. An anterior  portal was then created under direct visualization using a spinal  needle. We then explored the glenohumeral joint, which demonstrated  some sub-millimeter loose bodies that were easily removed with the  shaver or out through our outflow cannula. He had no significant  chondromalacia within the humeral head or the glenoid though. He did  have significant fraying of his labrum and with probing, he did have a  type 2 tear of his superior labrum. We thus elected to tenotomize the  biceps tendon from the labrum. We then viewed the rotator cuff from the  articular surface, which did not demonstrate any obvious full-thickness  tear, but there was a suspected tear. There was no significant  retraction. We then removed the scope from the glenohumeral joint and placed in the  subacromial space. A lateral portal was then created. He had a  moderate-to-large amount of bursitis, and a bursectomy was performed  using a shaver. He had a mild-to-moderate amount of anterior acromial  spur, and an anterior acromioplasty was performed using the jeronimo.   We  then viewed the rotator cuff from the bursal surface, which demonstrated  the He tolerated the procedure well without any complications. PLAN:  The patient will be recovered in the PACU and then be discharged  home. He will be in the sling for the next six weeks. We will not  start him on physical therapy until six weeks after surgery. He will  have no resisted elbow flexion, supination because of the biceps  tenodesis. He was given a prescription for Percocet as well as  Phenergan for nausea and vomiting. He was instructed to use the cooling  pad throughout the weekend with careful instructions to make sure the  cooling pad does not directly contact the skin to avoid the potential  for frostbite. He will follow up in the office in three to four days.         Enmanuel Weaver MD    D: 11/06/2020 11:30:24       T: 11/06/2020 12:42:34     [email or web address]  Job#: [id number]     Doc#: [id number]

## 2020-11-09 ENCOUNTER — OFFICE VISIT (OUTPATIENT)
Dept: ORTHOPEDIC SURGERY | Age: 60
End: 2020-11-09

## 2020-11-09 VITALS — HEIGHT: 77 IN | WEIGHT: 275 LBS | TEMPERATURE: 97.5 F | BODY MASS INDEX: 32.47 KG/M2

## 2020-11-09 PROCEDURE — 99024 POSTOP FOLLOW-UP VISIT: CPT | Performed by: ORTHOPAEDIC SURGERY

## 2020-11-09 NOTE — PROGRESS NOTES
Shoulder Arthroscopy Follow-up  Ayaan Curran Iesha is here for follow up after right shoulder arthroscopic surgery. Surgery date was 11/6/20. Findings at surgery: large rotator cuff tear, type 2 SLAP tear . Pain is controlled with current analgesics. Medication(s) being used: Percocet. The patient's pain is rated at 6/10. The patient denies fever, wound drainage, increasing redness, pus, increasing swelling. Post op problems reported: none. He has been in a sling and non-weightbearing as instructed. Physical Examination:  Temp 97.5 °F (36.4 °C)   Ht 6' 5\" (1.956 m)   Wt 275 lb (124.7 kg)   BMI 32.61 kg/m²    Patient is awake, alert, and in no acute distress. The incisions are clean, dry, no drainage. There is no warmth, erythema, or purulent drainage over the incisions. Sensation is intact to light touch in the axillary, median, radial, and ulnar nerve distribution bilaterally. The EPL, FPL, and interossei are grossly intact bilaterally. The Bilateral upper extremities are warm and well-perfused with brisk capillary refill. Assessment:   3 days status post right shoulder arthroscopy, subacromial decompression, rotator cuff repair, and biceps tenodesis      Plan: We reviewed intra-operative arthroscopy photos. Start physical therapy at 6 weeks postop. The patient may not advance their weight-bearing. Sling for 6 weeks total after surgery. Come out of sling for elbow ROM. The patient should use the cold pad or apply ice to the shoulder continuously for 3 days after surgery. Then use cold pad or ice 20-30 minutes only 3-5 times per day as needed. Refill pain medications as needed. No NSAIDs. Patient may start showering now. No baths or soaks. Apply band-aids to the incisions if the Tegaderms fall off. No driving while on pain medication if it causes impairment. No driving while shoulder is in sling as this may impair ability to control vehicle.     Return to office at the 2 week postop time period for suture removal and evaluation of progress.

## 2020-11-18 ENCOUNTER — TELEPHONE (OUTPATIENT)
Dept: ORTHOPEDIC SURGERY | Age: 60
End: 2020-11-18

## 2020-11-18 NOTE — TELEPHONE ENCOUNTER
Spoke with Teo Child. She wanted to verify when patient will be starting PT. Per Dr. Arnaldo Conn, he will start PT 6 weeks post op.  Faxed [facility] approval for PT as requested

## 2020-11-18 NOTE — TELEPHONE ENCOUNTER
Medical Facility Question     Facility Name: [facility] Name: [facility]    Contact Number: [phone]    Request or Information: Wanted to give some information on this patient.

## 2020-11-23 ENCOUNTER — OFFICE VISIT (OUTPATIENT)
Dept: ORTHOPEDIC SURGERY | Age: 60
End: 2020-11-23

## 2020-11-23 VITALS — TEMPERATURE: 97.7 F | HEIGHT: 77 IN | WEIGHT: 270 LBS | BODY MASS INDEX: 31.88 KG/M2

## 2020-11-23 PROCEDURE — 99024 POSTOP FOLLOW-UP VISIT: CPT | Performed by: ORTHOPAEDIC SURGERY

## 2020-11-23 RX ORDER — OXYCODONE HYDROCHLORIDE AND ACETAMINOPHEN 5; 325 MG/1; MG/1
1 TABLET ORAL 3 TIMES DAILY PRN
Qty: 21 TABLET | Refills: 0 | Status: SHIPPED | OUTPATIENT
Start: 2020-11-23 | End: 2021-11-09 | Stop reason: SDUPTHER

## 2020-11-23 NOTE — PROGRESS NOTES
Shoulder Arthroscopy Follow-up  Jeovany Wheelerjessica Jones is here for follow up after right shoulder arthroscopic surgery. Surgery date was 11/6/20. Findings at surgery: large rotator cuff tear, type 2 SLAP tear . Pain is controlled with current analgesics. Medication(s) being used: Percocet. The patient's pain is rated at 5/10. He has been in a sling and non-weightbearing as instructed. Physical Examination:  Temp 97.7 °F (36.5 °C)   Ht 6' 5\" (1.956 m)   Wt 270 lb (122.5 kg)   BMI 32.02 kg/m²     Patient is awake, alert, and in no acute distress. The incisions are healing. Sensation is intact to light touch in the axillary, median, radial, and ulnar nerve distribution bilaterally. The EPL, FPL, and interossei are grossly intact bilaterally. The Bilateral upper extremities are warm and well-perfused with brisk capillary refill. Assessment:   2 weeks status post right shoulder arthroscopy, subacromial decompression, rotator cuff repair, and biceps tenodesis      Plan:   Sutures were removed. Steri-strips and mastisol were applied. Patient may start taking baths or soaks at 4 weeks postop    Start physical therapy at 6 weeks postop. The patient may not advance their weight-bearing. Sling for 6 weeks total after surgery. Refill pain medications as needed. Script for Percocet refill. No NSAIDs. Return to office at the 6 week postop time period for evaluation of progress or prn if problems.

## 2020-12-29 ENCOUNTER — OFFICE VISIT (OUTPATIENT)
Dept: ORTHOPEDIC SURGERY | Age: 60
End: 2020-12-29

## 2020-12-29 VITALS — HEIGHT: 77 IN | BODY MASS INDEX: 32.35 KG/M2 | WEIGHT: 274 LBS | TEMPERATURE: 97.2 F

## 2020-12-29 PROCEDURE — 99024 POSTOP FOLLOW-UP VISIT: CPT | Performed by: ORTHOPAEDIC SURGERY

## 2020-12-29 NOTE — PROGRESS NOTES
Shoulder Arthroscopy Follow-up  Cherie Alfonso  is here for follow up after right shoulder arthroscopic surgery. Surgery date was 11/6/20. Findings at surgery: large rotator cuff tear, type 2 SLAP tear . Pain is controlled with current analgesics. Medication(s) being used: Percocet. The patient's pain is rated at 6/10. He has been in a sling and non-weightbearing as instructed. He started PT at [facility]. I did not provide him referral. He states he was called and told that PT was approved. He states he has been moving his shoulder with PT, sounds like passive and AAROM. He actively moves his shoulder today. Physical Examination:  Temp 97.2 °F (36.2 °C) (Infrared)   Ht 6' 5\" (1.956 m)   Wt 274 lb (124.3 kg)   BMI 32.49 kg/m²      Patient is awake, alert, and in no acute distress. The incisions are healed. Sensation is intact to light touch in the axillary, median, radial, and ulnar nerve distribution bilaterally. The EPL, FPL, and interossei are grossly intact bilaterally. The Bilateral upper extremities are warm and well-perfused with brisk capillary refill. Assessment:   7 weeks status post right shoulder arthroscopy, subacromial decompression, rotator cuff repair, and biceps tenodesis      Plan:   I informed him that I did not provide him referral to PT. He was in PT for his back. Sounds like he had the approval for PT from [address] [place] [address] which I requested from [address] [place] [address]. He was then called by [facility] to start. They did not have my referral. They did not have an precautions from me. Discussed that I hope they did not do any damage to the repair / tenodesis. Referral and protocol provided at this time. The patient may advance their weight-bearing. Discontinue sling. Refill pain medications as needed. No NSAIDs. Follow up in 2 months or evaluation of progress or prn if problems.

## 2021-01-29 ENCOUNTER — TELEPHONE (OUTPATIENT)
Dept: ORTHOPEDIC SURGERY | Age: 61
End: 2021-01-29

## 2021-01-29 NOTE — TELEPHONE ENCOUNTER
Other [facility]. Will Dr. Julio Guillermo be willing to accept this wokers comp case?  His DOI is 3/13/2020

## 2021-03-01 ENCOUNTER — OFFICE VISIT (OUTPATIENT)
Dept: ORTHOPEDIC SURGERY | Age: 61
End: 2021-03-01
Payer: COMMERCIAL

## 2021-03-01 VITALS — BODY MASS INDEX: 32.35 KG/M2 | TEMPERATURE: 97.5 F | HEIGHT: 77 IN | WEIGHT: 274 LBS

## 2021-03-01 DIAGNOSIS — S46.011D TRAUMATIC COMPLETE TEAR OF RIGHT ROTATOR CUFF, SUBSEQUENT ENCOUNTER: Primary | ICD-10-CM

## 2021-03-01 PROCEDURE — 99213 OFFICE O/P EST LOW 20 MIN: CPT | Performed by: ORTHOPAEDIC SURGERY

## 2021-03-01 NOTE — PROGRESS NOTES
History:  Carolann Dunbar Sr. is here for follow up after right shoulder arthroscopic surgery. Surgery date was 11/6/20. Findings at surgery: large rotator cuff tear, type 2 SLAP tear . Pain is controlled with current analgesics. Medication(s) being used: Percocet. The patient's pain is rated at 6/10. He been to PT at [facility]. See prior note about him starting before referral was provided. Physical Examination:  Temp 97.5 °F (36.4 °C)   Ht 6' 5\" (1.956 m)   Wt 274 lb (124.3 kg)   BMI 32.49 kg/m²       Patient is awake, alert, and in no acute distress. The incisions are healed. Sensation is intact to light touch in the axillary, median, radial, and ulnar nerve distribution bilaterally. The EPL, FPL, and interossei are grossly intact bilaterally. The Bilateral upper extremities are warm and well-perfused with brisk capillary refill. Right shoulder active forward flexion 135, passive 160-170, ER 45, IR back pocket  5-/5 Supraspinatus, External rotation        Assessment:   Almost 4 months status post right shoulder arthroscopy, subacromial decompression, rotator cuff repair, and biceps tenodesis      Plan:   Continue PT. Will request more visits. Refill pain medications as needed. NSAIDs prn. Letter provided for work. 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 It will likely be 6 months postop before he is ready for full duty. Follow up in 2 months or evaluation of progress or prn if problems.

## 2021-03-01 NOTE — LETTER
[facility]  [place] [address] [address] [address]  Phone: [phone]  Fax: [phone]    Nelson Portillo MD        March 1, 2021     Patient: Montse Knox Sr. 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 He is unlikely to return to work fully duty until 6 months post operative from 11/6/2020. He may drive a vehicle at this time. No lifting, pushing, pulling or carrying. If you have any questions or concerns, please don't hesitate to call.     Sincerely,     Nelson Portillo MD

## 2021-03-03 ENCOUNTER — TELEPHONE (OUTPATIENT)
Dept: ORTHOPEDIC SURGERY | Age: 61
End: 2021-03-03

## 2021-03-04 ENCOUNTER — TELEPHONE (OUTPATIENT)
Dept: ORTHOPEDIC SURGERY | Age: 61
End: 2021-03-04

## 2021-03-30 ENCOUNTER — OFFICE VISIT (OUTPATIENT)
Dept: ORTHOPEDIC SURGERY | Age: 61
End: 2021-03-30
Payer: COMMERCIAL

## 2021-03-30 VITALS — WEIGHT: 274 LBS | HEIGHT: 77 IN | TEMPERATURE: 97.3 F | BODY MASS INDEX: 32.35 KG/M2

## 2021-03-30 DIAGNOSIS — S39.012D LUMBAR STRAIN, SUBSEQUENT ENCOUNTER: Primary | ICD-10-CM

## 2021-03-30 PROCEDURE — 99214 OFFICE O/P EST MOD 30 MIN: CPT | Performed by: ORTHOPAEDIC SURGERY

## 2021-03-30 NOTE — PROGRESS NOTES
History of present illness:   Mr. Vidal Soni  is a pleasant 63 y.o. male with a PMH of HTN, HLD, gout, and anxiety here for consultation regarding his LBP and bilateral leg pain. He was previously evaluated by Berenice Ruffin CNP for low back pain from a workers comp injury in 2018. Patient states his low back and leg pain was resolved until new injury. He states his current pain is a new worker's compensation claim from an injury 3/13/20. He states his pain began after hooking up a trailer at work. Sustained a right shoulder injury at this time as well. His pain has steadily increased since then. He rates his back pain 7/10 and leg pain 7/10. He describes the pain as sharp, aching and constant that is worse with standing, rising from sitting, walking, and lying down and better with sitting and walking with grocery cart. The leg pain radiates down the posterior aspect of his legs to his ankles bilaterally. States R=L and alternates sides sporadically. He admits numbness and tingling in his posterior legs. He admits generalized weakness of his legs and denies bowel or bladder dysfunction. He states he can sit for a maximum of 30 minutes and stand for a maximum 10 minutes. The pain significantly disrupts his sleep. He takes NSAIDs. He has tried physical therapy. Past medical history:  His past medical history has been reviewed and is significant for HTN, HLD, gout, and anxiety    Past surgical history:  His past surgical history has been reviewed and is non-contributory to his present illness. His medications and allergies were reviewed.     Social history:  His social history has been reviewed and he smokes 0.25ppd    Current Medication:  Current Outpatient Medications   Medication Sig Dispense Refill    promethazine (PHENERGAN) 25 MG tablet Take 1 tablet by mouth every 6 hours as needed for Nausea 5 tablet 0    tadalafil (CIALIS) 5 MG tablet Take 1 tablet by mouth daily (Patient taking differently: Take 5 mg by mouth as needed ) 90 tablet 1    colchicine (MITIGARE) 0.6 MG capsule Take 1 capsule by mouth daily (Patient taking differently: Take 0.6 mg by mouth as needed ) 30 capsule 2    allopurinol (ZYLOPRIM) 300 MG tablet Take one tab a day & cancel script for allopurinol 100 mg (Patient taking differently: Take 300 mg by mouth daily Take one tab a day & cancel script for allopurinol 100 mg) 90 tablet 1    bisoprolol-hydrochlorothiazide (ZIAC) 10-6.25 MG per tablet TAKE ONE TABLET BY MOUTH TWICE A  tablet 1    atorvastatin (LIPITOR) 80 MG tablet TAKE 1 TABLET BY MOUTH ONE TIME A DAY (Patient taking differently: Take 80 mg by mouth nightly TAKE 1 TABLET BY MOUTH ONE TIME A DAY ) 90 tablet 1    loratadine (CLARITIN) 10 MG tablet Take 1 tablet by mouth daily (Patient taking differently: Take 10 mg by mouth as needed ) 30 tablet 0     No current facility-administered medications for this visit. Review of symptoms:  Patient's review of symptoms was reviewed and is significant for back pain and negative for recent weight loss, fatigue, chills, visual disturbances, blood in stool or urine, recent infection, chest pain, or shortness of breath. All other ROS were negative. Physical examination:  Mr. Carolann Dunbar Sr.'s most recent vitals:  Vitals  Temp: 97.3 °F (36.3 °C)  Temp Source: Infrared  Height: 6' 5\" (195.6 cm)  Weight: 274 lb (124.3 kg)  Body mass index is 32.49 kg/m². General exam:  He is well-developed and well-nourished, is in obvious pain and alert and oriented to person, place, and time. He demonstrates appropriate mood and affect. His skin is warm and dry. His gait is normal and he walks heel to toe without limp or instability. Back:  He stands with slight lumbar flexion. His lumbar flexion, extension and lateral bending are moderately reduced with pain. He has mild tenderness over his lumbar spine without obvious muscle spasm.  The skin over his lumbar spine is normal without a surgical scar. Lower extremities:  He has 5/5 motor strength of bilateral lower extremities. He has a negative straight leg raise, bilaterally. Deep tendon reflexes at knees and achilles are 2+. Sensation is intact to light touch L3 to S1 bilaterally. He has no clonus. Hip range of motion painless. Imaging:  I reviewed MRI images of his lumbar spine from 6/25/18 and August 2020. They note multilevel lumbar spondylosis with degenerative disc L5-S1 with possible annular tear. No neural impingement appreciated. Assessment:  Lumbar strain    Plan:  We discussed treatment options including observation, additional physical therapy, chiropractic care, spinal injections and spinal surgery. I do not believe he is a candidate for spinal surgery at this time. He is going to try chiropractic care.

## 2021-04-22 DIAGNOSIS — M10.9 GOUTY ARTHRITIS: ICD-10-CM

## 2021-04-22 DIAGNOSIS — M1A.0790 CHRONIC IDIOPATHIC GOUT INVOLVING TOE, UNSPECIFIED LATERALITY: ICD-10-CM

## 2021-04-22 RX ORDER — ALLOPURINOL 300 MG/1
300 TABLET ORAL DAILY
Qty: 90 TABLET | Refills: 2 | Status: SHIPPED | OUTPATIENT
Start: 2021-04-22 | End: 2021-04-27 | Stop reason: SDUPTHER

## 2021-04-23 ENCOUNTER — TELEPHONE (OUTPATIENT)
Dept: ORTHOPEDIC SURGERY | Age: 61
End: 2021-04-23

## 2021-04-26 ENCOUNTER — OFFICE VISIT (OUTPATIENT)
Dept: ORTHOPEDIC SURGERY | Age: 61
End: 2021-04-26
Payer: COMMERCIAL

## 2021-04-26 ENCOUNTER — TELEPHONE (OUTPATIENT)
Dept: ORTHOPEDIC SURGERY | Age: 61
End: 2021-04-26

## 2021-04-26 VITALS — WEIGHT: 274 LBS | HEIGHT: 76 IN | TEMPERATURE: 97.2 F | RESPIRATION RATE: 16 BRPM | BODY MASS INDEX: 33.36 KG/M2

## 2021-04-26 DIAGNOSIS — S46.011D TRAUMATIC COMPLETE TEAR OF RIGHT ROTATOR CUFF, SUBSEQUENT ENCOUNTER: Primary | ICD-10-CM

## 2021-04-26 PROCEDURE — 99213 OFFICE O/P EST LOW 20 MIN: CPT | Performed by: ORTHOPAEDIC SURGERY

## 2021-04-26 NOTE — PROGRESS NOTES
History:  Reshma Keene Sr. is here for follow up after right shoulder arthroscopic surgery. Surgery date was 11/6/20. Findings at surgery: large rotator cuff tear, type 2 SLAP tear. The patient's pain is rated at 5/10. He is a . He is still doing PT. Physical Examination:  Temp 97.2 °F (36.2 °C)   Resp 16   Ht 6' 4\" (1.93 m)   Wt 274 lb (124.3 kg)   BMI 33.35 kg/m²        Patient is awake, alert, and in no acute distress. Sensation is intact to light touch in the axillary, median, radial, and ulnar nerve distribution bilaterally. The EPL, FPL, and interossei are grossly intact bilaterally. The Bilateral upper extremities are warm and well-perfused with brisk capillary refill. Right shoulder active forward flexion 160, passive 160-170, ER 60, IR L4  5-/5 Supraspinatus, External rotation        Assessment:   5 1/2 months status post right shoulder arthroscopy, subacromial decompression, rotator cuff repair, and biceps tenodesis      Plan:   His range of motion has improved. However, he still has rotator cuff weakness. We discussed my concerns about him returning to full duty at this time as a . He agreed with this and stated that he likely would not feel to past the [facility] physical at this time. Continue physical therapy. I will request work hardening program from physical therapy on the [employer].    NSAIDs prn. Ice/heat as needed. Letter provided for his continued work restrictions for sedentary work. Follow up in 6 to 8 weeks or evaluation of progress or prn if problems.

## 2021-04-27 ENCOUNTER — TELEPHONE (OUTPATIENT)
Dept: ORTHOPEDIC SURGERY | Age: 61
End: 2021-04-27

## 2021-04-27 ENCOUNTER — TELEPHONE (OUTPATIENT)
Dept: PRIMARY CARE CLINIC | Age: 61
End: 2021-04-27

## 2021-04-27 DIAGNOSIS — M10.9 GOUTY ARTHRITIS: ICD-10-CM

## 2021-04-27 DIAGNOSIS — M1A.0790 CHRONIC IDIOPATHIC GOUT INVOLVING TOE, UNSPECIFIED LATERALITY: ICD-10-CM

## 2021-04-27 RX ORDER — ALLOPURINOL 300 MG/1
300 TABLET ORAL DAILY
Qty: 90 TABLET | Refills: 1 | Status: SHIPPED | OUTPATIENT
Start: 2021-04-27 | End: 2021-07-13 | Stop reason: SDUPTHER

## 2021-04-27 NOTE — TELEPHONE ENCOUNTER
----- Message from Alyne Nyhan sent at 4/26/2021  5:11 PM EDT -----  Subject: Refill Request    QUESTIONS  Name of Medication? allopurinol (ZYLOPRIM) 300 MG tablet  Patient-reported dosage and instructions? takes 2 a day   How many days do you have left? 0  Preferred Pharmacy? [address] [facility] [address]  Pharmacy phone number (if available)? [phone]  Additional Information for Provider? [facility] called in the prescription   last Wednesday and Friday, the provider has not responded. ---------------------------------------------------------------------------  --------------  Benji JULES  What is the best way for the office to contact you? OK to leave message on   voicemail  Preferred Call Back Phone Number?  [phone]

## 2021-04-27 NOTE — TELEPHONE ENCOUNTER
Called and spoke with patient about the following message:    [facility].

## 2021-06-10 ENCOUNTER — TELEPHONE (OUTPATIENT)
Dept: ORTHOPEDIC SURGERY | Age: 61
End: 2021-06-10

## 2021-06-10 NOTE — TELEPHONE ENCOUNTER
L/M ROSCOE LOREDO regarding her message and requesting call or fax with further details as to what is being requested for this conference call to ensure it is scheduled appropriately and allows sufficient time for both parties. Phone and fax number provided.  No patient information left on vm as it did not state whether or not it was confidential.

## 2021-06-10 NOTE — TELEPHONE ENCOUNTER
Manuelito Cortés from [facility] and [facility] law office called wanting to set up a conference call [address]ella Runner Dr. Lisbeth Llano. ph. [phone]

## 2021-06-14 ENCOUNTER — OFFICE VISIT (OUTPATIENT)
Dept: ORTHOPEDIC SURGERY | Age: 61
End: 2021-06-14
Payer: COMMERCIAL

## 2021-06-14 ENCOUNTER — TELEPHONE (OUTPATIENT)
Dept: ORTHOPEDIC SURGERY | Age: 61
End: 2021-06-14

## 2021-06-14 VITALS — WEIGHT: 279 LBS | BODY MASS INDEX: 33.97 KG/M2 | RESPIRATION RATE: 16 BRPM | HEIGHT: 76 IN

## 2021-06-14 DIAGNOSIS — S46.011D TRAUMATIC COMPLETE TEAR OF RIGHT ROTATOR CUFF, SUBSEQUENT ENCOUNTER: Primary | ICD-10-CM

## 2021-06-14 PROCEDURE — 99213 OFFICE O/P EST LOW 20 MIN: CPT | Performed by: ORTHOPAEDIC SURGERY

## 2021-06-14 NOTE — LETTER
[facility]  [address] [address]  Phone: [phone]  Fax: [phone]    Luis Alejandra MD      October 4, 2021     Patient: Girish Metz Sr. YOB: 1960   Date of Visit: 6/14/2021       To Whom It May Concern: It is my medical opinion that Girish Metz may return to work with the following restrictions:  · He may drive a car at this time. · No lifting, pushing, pulling or carrying. · Restrictions in place for 3 months. If you have any questions or concerns, please don't hesitate to call.     Sincerely,    Luis Alejandra MD

## 2021-06-14 NOTE — LETTER
[facility]  [place] [address] [address] [address]  Phone: [phone]  Fax: [phone]    Anna Ward MD        June 14, 2021     Patient: Rambo Flower Sr. YOB: 1960   Date of Visit: 6/14/2021       To Whom It May Concern: It is my medical opinion that Rambo Flower should continue current restrictions for the next two months. If you have any questions or concerns, please don't hesitate to call.     Sincerely,          Anna Ward MD

## 2021-06-14 NOTE — PROGRESS NOTES
History:  Benton Olguin  is here for follow up after right shoulder arthroscopic surgery. Surgery date was 11/6/20. Findings at surgery: large rotator cuff tear, type 2 SLAP tear. The patient's pain is rated at 7/10. He is a . He is still doing PT at [facility]. He feels like his shoulder locks up with range of motion. Pain is located laterally. He and his  would like you referral to pain management. Physical Examination:  Resp 16   Ht 6' 4\" (1.93 m)   Wt 279 lb (126.6 kg)   BMI 33.96 kg/m²        Patient is awake, alert, and in no acute distress. Sensation is intact to light touch in the axillary, median, radial, and ulnar nerve distribution bilaterally. The EPL, FPL, and interossei are grossly intact bilaterally. The Bilateral upper extremities are warm and well-perfused with brisk capillary refill. Right shoulder active forward flexion 135, passive 160-170, ER 60, IR L4  5-/5 Supraspinatus, External rotation        Assessment:   7 months status post right shoulder arthroscopy, subacromial decompression, rotator cuff repair, and biceps tenodesis      Plan:   His range of motion has regressed. He still has rotator cuff weakness. We will request repeat MRI of his right shoulder to evaluate the integrity of the rotator cuff repair. We will keep him on same work restrictions. He appears worse than he was at the last visit. Hold physical therapy for now. NSAIDs prn. Ice/heat as needed. Letter provided for his continued work restrictions. Referral to pain management provided at his request.  We will request this on C9. Follow up after MRI.

## 2021-06-17 ENCOUNTER — TELEPHONE (OUTPATIENT)
Dept: ORTHOPEDIC SURGERY | Age: 61
End: 2021-06-17

## 2021-06-17 NOTE — TELEPHONE ENCOUNTER
GAVE [facility] MEDICAL RECORDS --ALL-- TO JESÚS SZYMANSKI TO SCAN IN MRO TO [facility].  [id number] [facility]    E-MAILED [facility]  REQUEST FOR BILLING REQUEST

## 2021-06-17 NOTE — TELEPHONE ENCOUNTER
Cannot provide him with any definite information at this time. I ordered a repeat MRI, which has not been performed yet. Once I have those results, can provide information about future treatment needs. If we do a call now, that is what I will say.

## 2021-06-17 NOTE — TELEPHONE ENCOUNTER
S/W KAREN as she sent a fax requesting to schedule a conference call again. Reiterated that further information is needed regarding their request such as the topic and requesting length of time. She states that the  is requesting to discuss the patient's future tx needs and estimates the conference call to last 10-15 minutes. I explained to her that I will forward this to Dr. Leopoldo Reveal for review, and once he determines the necessity and furthe details I will fax her correspondence regarding scheduling. She provides a fax number of [phone]. Please advise on how to proceed.

## 2021-06-18 ENCOUNTER — TELEPHONE (OUTPATIENT)
Dept: ORTHOPEDIC SURGERY | Age: 61
End: 2021-06-18

## 2021-06-18 NOTE — TELEPHONE ENCOUNTER
I spoke to pt. He would like to come  a copy of his MRI order and a copy of the referral to pain management, at [facility], on Monday.

## 2021-06-18 NOTE — TELEPHONE ENCOUNTER
Other Spoke with injured worker, he is wanting to speak to clinic about his MRI and pain management referral. ph. [phone]

## 2021-06-25 ENCOUNTER — TELEPHONE (OUTPATIENT)
Dept: PRIMARY CARE CLINIC | Age: 61
End: 2021-06-25

## 2021-06-25 NOTE — TELEPHONE ENCOUNTER
Pt is calling to find out why his medication had been cancelled? Pt states that he is in a lot of pain. Thank you!     Pt ree: [phone]

## 2021-06-25 NOTE — TELEPHONE ENCOUNTER
[facility]  Discuss meds:   Needing to clarify if you still want pt on colchicine and indomethacin. If so, can you pls send a refill request over? Thank you!     Devika: [phone]

## 2021-06-28 ENCOUNTER — TELEPHONE (OUTPATIENT)
Dept: PRIMARY CARE CLINIC | Age: 61
End: 2021-06-28

## 2021-06-28 NOTE — TELEPHONE ENCOUNTER
Refill on colchicine (MITIGARE) 0.6 MG & indomethacin (INDOCIN) 50 MG please send to [address] on [facility], patient appointment schedule 7/13/21.

## 2021-06-29 RX ORDER — COLCHICINE 0.6 MG/1
TABLET ORAL
Qty: 60 TABLET | Refills: 0 | Status: SHIPPED | OUTPATIENT
Start: 2021-06-29 | End: 2021-07-13 | Stop reason: SDUPTHER

## 2021-06-29 NOTE — TELEPHONE ENCOUNTER
Medication:   Requested Prescriptions     Pending Prescriptions Disp Refills    colchicine (COLCRYS) 0.6 MG tablet [Pharmacy Med Name: Colchicine 0.6 MG Oral Tablet] 60 tablet 0     Sig: Take 1 tablet by mouth once daily        Last Filled:      Patient Phone Number: [phone] (home)     Last appt: 10/20/2020   Next appt: 7/13/2021    Last OARRS:   RX Monitoring 4/2/2018   Attestation The Prescription Monitoring Report for this patient was reviewed today. Periodic Controlled Substance Monitoring Possible medication side effects, risk of tolerance/dependence & alternative treatments discussed. ;Random urine drug screen sent today. Chronic Pain > 80 MEDD Treatment objectives documented - patient is progressing appropriately.

## 2021-07-13 ENCOUNTER — OFFICE VISIT (OUTPATIENT)
Dept: PRIMARY CARE CLINIC | Age: 61
End: 2021-07-13
Payer: COMMERCIAL

## 2021-07-13 VITALS
DIASTOLIC BLOOD PRESSURE: 74 MMHG | BODY MASS INDEX: 33.36 KG/M2 | HEART RATE: 66 BPM | TEMPERATURE: 97.1 F | OXYGEN SATURATION: 98 % | SYSTOLIC BLOOD PRESSURE: 109 MMHG | HEIGHT: 76 IN | WEIGHT: 274 LBS

## 2021-07-13 DIAGNOSIS — M1A.0790 CHRONIC IDIOPATHIC GOUT INVOLVING TOE, UNSPECIFIED LATERALITY: ICD-10-CM

## 2021-07-13 DIAGNOSIS — E78.00 PURE HYPERCHOLESTEROLEMIA: ICD-10-CM

## 2021-07-13 DIAGNOSIS — Z12.11 SCREEN FOR COLON CANCER: ICD-10-CM

## 2021-07-13 DIAGNOSIS — Z11.4 ENCOUNTER FOR SCREENING FOR HIV: ICD-10-CM

## 2021-07-13 DIAGNOSIS — Z12.5 PROSTATE CANCER SCREENING: Primary | ICD-10-CM

## 2021-07-13 DIAGNOSIS — Z12.5 PROSTATE CANCER SCREENING: ICD-10-CM

## 2021-07-13 DIAGNOSIS — I10 ESSENTIAL HYPERTENSION: ICD-10-CM

## 2021-07-13 DIAGNOSIS — N52.9 ERECTILE DYSFUNCTION, UNSPECIFIED ERECTILE DYSFUNCTION TYPE: ICD-10-CM

## 2021-07-13 PROCEDURE — 99214 OFFICE O/P EST MOD 30 MIN: CPT | Performed by: FAMILY MEDICINE

## 2021-07-13 RX ORDER — BISOPROLOL FUMARATE AND HYDROCHLOROTHIAZIDE 10; 6.25 MG/1; MG/1
TABLET ORAL
Qty: 180 TABLET | Refills: 1 | Status: SHIPPED | OUTPATIENT
Start: 2021-07-13 | End: 2022-02-15 | Stop reason: SDUPTHER

## 2021-07-13 RX ORDER — INDOMETHACIN 50 MG/1
50 CAPSULE ORAL 2 TIMES DAILY WITH MEALS
Qty: 180 CAPSULE | Refills: 1 | Status: SHIPPED | OUTPATIENT
Start: 2021-07-13 | End: 2021-10-12

## 2021-07-13 RX ORDER — COLCHICINE 0.6 MG/1
TABLET ORAL
Qty: 90 TABLET | Refills: 0 | Status: SHIPPED | OUTPATIENT
Start: 2021-07-13 | End: 2022-07-27

## 2021-07-13 RX ORDER — ATORVASTATIN CALCIUM 80 MG/1
TABLET, FILM COATED ORAL
Qty: 90 TABLET | Refills: 1 | Status: SHIPPED | OUTPATIENT
Start: 2021-07-13 | End: 2022-02-15 | Stop reason: SDUPTHER

## 2021-07-13 RX ORDER — TADALAFIL 5 MG/1
5 TABLET ORAL DAILY
Qty: 90 TABLET | Refills: 1 | Status: SHIPPED | OUTPATIENT
Start: 2021-07-13 | End: 2021-11-12 | Stop reason: SDUPTHER

## 2021-07-13 RX ORDER — ALLOPURINOL 300 MG/1
300 TABLET ORAL DAILY
Qty: 90 TABLET | Refills: 1 | Status: SHIPPED | OUTPATIENT
Start: 2021-07-13 | End: 2021-11-12 | Stop reason: SDUPTHER

## 2021-07-13 SDOH — SOCIAL STABILITY: SOCIAL NETWORK: ARE YOU MARRIED, WIDOWED, DIVORCED, SEPARATED, NEVER MARRIED, OR LIVING WITH A PARTNER?: PATIENT DECLINED

## 2021-07-13 SDOH — SOCIAL STABILITY: SOCIAL INSECURITY
WITHIN THE LAST YEAR, HAVE TO BEEN RAPED OR FORCED TO HAVE ANY KIND OF SEXUAL ACTIVITY BY YOUR PARTNER OR EX-PARTNER?: PATIENT DECLINED

## 2021-07-13 SDOH — HEALTH STABILITY: MENTAL HEALTH: HOW OFTEN DO YOU HAVE A DRINK CONTAINING ALCOHOL?: PATIENT DECLINED

## 2021-07-13 SDOH — ECONOMIC STABILITY: HOUSING INSECURITY
IN THE LAST 12 MONTHS, WAS THERE A TIME WHEN YOU DID NOT HAVE A STEADY PLACE TO SLEEP OR SLEPT IN A SHELTER (INCLUDING NOW)?: PATIENT REFUSED

## 2021-07-13 SDOH — HEALTH STABILITY: MENTAL HEALTH: HOW MANY STANDARD DRINKS CONTAINING ALCOHOL DO YOU HAVE ON A TYPICAL DAY?: PATIENT DECLINED

## 2021-07-13 SDOH — HEALTH STABILITY: MENTAL HEALTH
STRESS IS WHEN SOMEONE FEELS TENSE, NERVOUS, ANXIOUS, OR CAN'T SLEEP AT NIGHT BECAUSE THEIR MIND IS TROUBLED. HOW STRESSED ARE YOU?: PATIENT DECLINED

## 2021-07-13 SDOH — SOCIAL STABILITY: SOCIAL INSECURITY
WITHIN THE LAST YEAR, HAVE YOU BEEN HUMILIATED OR EMOTIONALLY ABUSED IN OTHER WAYS BY YOUR PARTNER OR EX-PARTNER?: PATIENT DECLINED

## 2021-07-13 SDOH — ECONOMIC STABILITY: FOOD INSECURITY: WITHIN THE PAST 12 MONTHS, YOU WORRIED THAT YOUR FOOD WOULD RUN OUT BEFORE YOU GOT MONEY TO BUY MORE.: PATIENT DECLINED

## 2021-07-13 SDOH — ECONOMIC STABILITY: FOOD INSECURITY: WITHIN THE PAST 12 MONTHS, THE FOOD YOU BOUGHT JUST DIDN'T LAST AND YOU DIDN'T HAVE MONEY TO GET MORE.: PATIENT DECLINED

## 2021-07-13 SDOH — SOCIAL STABILITY: SOCIAL INSECURITY
WITHIN THE LAST YEAR, HAVE YOU BEEN KICKED, HIT, SLAPPED, OR OTHERWISE PHYSICALLY HURT BY YOUR PARTNER OR EX-PARTNER?: PATIENT DECLINED

## 2021-07-13 SDOH — SOCIAL STABILITY: SOCIAL NETWORK: HOW OFTEN DO YOU ATTENT MEETINGS OF THE CLUB OR ORGANIZATION YOU BELONG TO?: PATIENT DECLINED

## 2021-07-13 SDOH — SOCIAL STABILITY: SOCIAL NETWORK: IN A TYPICAL WEEK, HOW MANY TIMES DO YOU TALK ON THE PHONE WITH FAMILY, FRIENDS, OR NEIGHBORS?: PATIENT DECLINED

## 2021-07-13 SDOH — ECONOMIC STABILITY: INCOME INSECURITY: HOW HARD IS IT FOR YOU TO PAY FOR THE VERY BASICS LIKE FOOD, HOUSING, MEDICAL CARE, AND HEATING?: PATIENT DECLINED

## 2021-07-13 SDOH — ECONOMIC STABILITY: TRANSPORTATION INSECURITY
IN THE PAST 12 MONTHS, HAS LACK OF TRANSPORTATION KEPT YOU FROM MEETINGS, WORK, OR FROM GETTING THINGS NEEDED FOR DAILY LIVING?: PATIENT DECLINED

## 2021-07-13 SDOH — SOCIAL STABILITY: SOCIAL NETWORK: HOW OFTEN DO YOU GET TOGETHER WITH FRIENDS OR RELATIVES?: PATIENT DECLINED

## 2021-07-13 SDOH — HEALTH STABILITY: PHYSICAL HEALTH
ON AVERAGE, HOW MANY DAYS PER WEEK DO YOU ENGAGE IN MODERATE TO STRENUOUS EXERCISE (LIKE A BRISK WALK)?: PATIENT DECLINED

## 2021-07-13 SDOH — SOCIAL STABILITY: SOCIAL NETWORK: HOW OFTEN DO YOU ATTEND CHURCH OR RELIGIOUS SERVICES?: PATIENT DECLINED

## 2021-07-13 SDOH — ECONOMIC STABILITY: INCOME INSECURITY: IN THE LAST 12 MONTHS, WAS THERE A TIME WHEN YOU WERE NOT ABLE TO PAY THE MORTGAGE OR RENT ON TIME?: PATIENT REFUSED

## 2021-07-13 SDOH — HEALTH STABILITY: PHYSICAL HEALTH: ON AVERAGE, HOW MANY MINUTES DO YOU ENGAGE IN EXERCISE AT THIS LEVEL?: PATIENT DECLINED

## 2021-07-13 SDOH — SOCIAL STABILITY: SOCIAL INSECURITY: WITHIN THE LAST YEAR, HAVE YOU BEEN AFRAID OF YOUR PARTNER OR EX-PARTNER?: PATIENT DECLINED

## 2021-07-13 SDOH — ECONOMIC STABILITY: TRANSPORTATION INSECURITY
IN THE PAST 12 MONTHS, HAS THE LACK OF TRANSPORTATION KEPT YOU FROM MEDICAL APPOINTMENTS OR FROM GETTING MEDICATIONS?: PATIENT DECLINED

## 2021-07-13 ASSESSMENT — PATIENT HEALTH QUESTIONNAIRE - PHQ9
1. LITTLE INTEREST OR PLEASURE IN DOING THINGS: 0
SUM OF ALL RESPONSES TO PHQ QUESTIONS 1-9: 0
2. FEELING DOWN, DEPRESSED OR HOPELESS: 0
SUM OF ALL RESPONSES TO PHQ QUESTIONS 1-9: 0
SUM OF ALL RESPONSES TO PHQ9 QUESTIONS 1 & 2: 0
SUM OF ALL RESPONSES TO PHQ QUESTIONS 1-9: 0

## 2021-07-14 ENCOUNTER — TELEPHONE (OUTPATIENT)
Dept: PRIMARY CARE CLINIC | Age: 61
End: 2021-07-14

## 2021-07-14 DIAGNOSIS — R79.89 ELEVATED LFTS: Primary | ICD-10-CM

## 2021-07-14 LAB
ANION GAP SERPL CALCULATED.3IONS-SCNC: 16 MMOL/L (ref 3–16)
AST SERPL-CCNC: 86 U/L (ref 15–37)
BUN BLDV-MCNC: 12 MG/DL (ref 7–20)
CALCIUM SERPL-MCNC: 9.4 MG/DL (ref 8.3–10.6)
CHLORIDE BLD-SCNC: 104 MMOL/L (ref 99–110)
CHOLESTEROL, FASTING: 136 MG/DL (ref 0–199)
CO2: 21 MMOL/L (ref 21–32)
CREAT SERPL-MCNC: 0.8 MG/DL (ref 0.9–1.3)
GFR AFRICAN AMERICAN: >60
GFR NON-AFRICAN AMERICAN: >60
GLUCOSE BLD-MCNC: 110 MG/DL (ref 70–99)
HDLC SERPL-MCNC: 74 MG/DL (ref 40–60)
HIV AG/AB: NORMAL
HIV ANTIGEN: NORMAL
HIV-1 ANTIBODY: NORMAL
HIV-2 AB: NORMAL
LDL CHOLESTEROL CALCULATED: 40 MG/DL
POTASSIUM SERPL-SCNC: 3.6 MMOL/L (ref 3.5–5.1)
PROSTATE SPECIFIC ANTIGEN: 1.33 NG/ML (ref 0–4)
SODIUM BLD-SCNC: 141 MMOL/L (ref 136–145)
TRIGLYCERIDE, FASTING: 109 MG/DL (ref 0–150)
URIC ACID, SERUM: 3.8 MG/DL (ref 3.5–7.2)
VLDLC SERPL CALC-MCNC: 22 MG/DL

## 2021-07-14 NOTE — TELEPHONE ENCOUNTER
Spoke with pt today  Has some elevation of lft's  ETOH  Occasional use only  On indocin and atorvastatin  No history of hep      Stop atorvastatin & indocin     Get cpk, hepatic panel, hep panel  See me in 4 weeks

## 2021-07-16 ENCOUNTER — TELEPHONE (OUTPATIENT)
Dept: PAIN MANAGEMENT | Age: 61
End: 2021-07-16

## 2021-07-16 ENCOUNTER — TELEPHONE (OUTPATIENT)
Dept: ORTHOPEDIC SURGERY | Age: 61
End: 2021-07-16

## 2021-07-16 NOTE — TELEPHONE ENCOUNTER
Workers comp np referral scanned into [facility] for Dr. Angelia Ortiz review on 7/14/2021 with in an basket message routed to [employer].

## 2021-07-16 NOTE — TELEPHONE ENCOUNTER
S/W PATIENT to let him know the referral to pain management he asked us to request from [facility] has been approved. He was provided the phone number to call to schedule with Dr. Emmanuel Valdivia. Patient voiced understanding of the conversation and will contact the office with further questions or concerns.

## 2021-07-20 ENCOUNTER — TELEPHONE (OUTPATIENT)
Dept: ORTHOPEDIC SURGERY | Age: 61
End: 2021-07-20

## 2021-07-20 NOTE — TELEPHONE ENCOUNTER
GAVE JADA / [place] / [address] [place] [address] --ALL-- TO Jennyfer Estrada TO SCAN IN MRO TO  LIBBY, [address] [address] veronica    E-MAILED [facility]  REQUEST FOR ITEMIZED BILLING STATEMENTS

## 2021-07-27 ENCOUNTER — TELEPHONE (OUTPATIENT)
Dept: ORTHOPEDIC SURGERY | Age: 61
End: 2021-07-27

## 2021-07-27 NOTE — TELEPHONE ENCOUNTER
IRENE/YOAN LOREDO requesting further information regarding the conference such as questions that may be asked, particular topics and/or an estimated time as to determine proper scheduling coordination and if pre-conference information will need to be obtained prior to scheduling. Requested she fax this information to [phone] or leave it in a message with the office.

## 2021-07-27 NOTE — TELEPHONE ENCOUNTER
Milana with Dr. Cruz Presumeris would like a call from [facility] [place] regarding setting up a conference call with Vaishali Noble. She may be reached at [phone].

## 2021-08-03 NOTE — TELEPHONE ENCOUNTER
L/M FOR KAREN regarding scheduling 10-15 minute phone discussion regarding future tx for patient. Explained this can take place on 8/4/2021 at 9:30am. They were asked to call the EG  if (# provided) if this is acceptable for their office.

## 2021-08-09 ENCOUNTER — TELEPHONE (OUTPATIENT)
Dept: ORTHOPEDIC SURGERY | Age: 61
End: 2021-08-09

## 2021-08-09 ENCOUNTER — OFFICE VISIT (OUTPATIENT)
Dept: ORTHOPEDIC SURGERY | Age: 61
End: 2021-08-09
Payer: COMMERCIAL

## 2021-08-09 VITALS — WEIGHT: 271.1 LBS | BODY MASS INDEX: 33.01 KG/M2 | HEIGHT: 76 IN | RESPIRATION RATE: 14 BRPM

## 2021-08-09 DIAGNOSIS — S46.011A TRAUMATIC COMPLETE TEAR OF RIGHT ROTATOR CUFF, INITIAL ENCOUNTER: Primary | ICD-10-CM

## 2021-08-09 PROCEDURE — 99214 OFFICE O/P EST MOD 30 MIN: CPT | Performed by: ORTHOPAEDIC SURGERY

## 2021-08-09 NOTE — PROGRESS NOTES
History:  Nat Castro Sr. is here for follow up after right shoulder arthroscopic surgery. Surgery date was 11/6/20. Findings at surgery: large rotator cuff tear, type 2 SLAP tear. The patient's pain is rated at 6/10. He is a . Physical Examination:  Resp 14   Ht 6' 4\" (1.93 m)   Wt 271 lb 1.6 oz (123 kg)   BMI 33.00 kg/m²        Patient is awake, alert, and in no acute distress. Sensation is intact to light touch in the axillary, median, radial, and ulnar nerve distribution bilaterally. The EPL, FPL, and interossei are grossly intact bilaterally. The Bilateral upper extremities are warm and well-perfused with brisk capillary refill. Right shoulder active forward flexion 135, passive 160-170, ER 60, IR L4  5-/5 Supraspinatus, External rotation      MRI right shoulder from [facility]: I independently reviewed the images, as well as the radiology report. Moderatesized fullthickness partial tear of the distal supraspinatus tendon. There is no tendon retraction. Tendinosis changes and intrasubstance fissuring at the myotendinous junction. Assessment:   9 months status post right shoulder arthroscopy, subacromial decompression, rotator cuff repair, and biceps tenodesis      Plan:   We discussed the MRI findings show rotator cuff tear. This is either retear, or failure to heal.    I had an extensive discussion with Mr. Vincent Brenner and/or family regarding the natural history, etiology, and long term consequences of his condition. I have presented reasonable alternatives to the patient's proposed care, treatment, and services. I have outlined a treatment plan with them and, in my opinion, surgical intervention is indicated at this time.  The discussion I have had encompassed risks, benefits, and side effects related to the alternatives and the risks related to not receiving the proposed care, treatment, and services  I have discussed the potential complications (including, but not limited to injury to nerve or blood vessel, infection, bleeding, DVT or PE, stiffness, incomplete pain relief, need for further surgery, and anesthetic complications), limitations, expectations, alternatives, and risks of the surgical procedure. We also discussed the importance of postoperative rehabilitation. He has had full opportunity to ask his questions. I have answered them all to his satisfaction. I feel that Mr. Milly Mckay understands our discussion today and he will provide written informed consent for the procedure. Plan for right shoulder arthroscopy, revision rotator cuff repair with bio inductive implant. We will keep him on same work restrictions. He appears worse than he was at the last visit. We will request surgery from [facility]. The patient will see their PCP for H&P and clearance for surgery.   Rotator cuff tear

## 2021-08-09 NOTE — TELEPHONE ENCOUNTER
Manuelito Cortés at [facility] office called concerning a phone number that Adelina Davila would need to call for the confrence call between her and Dr. Kelly Ramirez on 8/11/2021 @12:45pm. ph. [phone]

## 2021-08-09 NOTE — LETTER
AS OF 12/15/2021 SURGERY DENIED   Surgery Scheduling Form:    Patient Name:  Denisha Serna Sr.  Patient :  1960     Patient MR#:  [id number]    Patient Address:  [address] [place] [address]    Surgeon:  Jenny Espinosa. Armond Mathis M.D.    PCP:  Yenifer Knig MD  Insurance:    Payor/Plan Subscr  Sex Relation Sub. Ins. ID Effective Group Num   [facility] 1960 Male Self [id number]                                    [address]   2. GENERIC SELF-* FIDE MAHAN SR. 1960 Male Self [id number] 3/13/20                                    [address] [place] [address]     Diagnosis:    1. Traumatic complete tear of right rotator cuff, initial encounter  S46.011A       Operation:  Right shoulder arthroscopy, revision rotator cuff repair with bioinductive implant. [id number]    Location:  [facility]  Surgeon's Scheduling Instruction:  elective  Requested Date:     OR Time:       Patient Arrival Time:    OR Time Required:  80  Minutes    Anesthesia:  General + block  Surgical Assistant required:  Yes   Equipment:  Arthrex and Regeneten  Standard C-Arm:  No    Mini C-Arm:  No  Status:  Outpatient   PAT Required:  Yes    Comments:   Covid test: to be done within 6 days of surgery, patient to bring results           Jenny Espinosa.  Armond Mathis MD      21 11:20 AM EDT

## 2021-08-09 NOTE — LETTER
[facility]  [address] [address]  Phone: [phone]  Fax: [phone]    Molly Yu MD        August 9, 2021     Patient: Dion Feldman Sr. 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 No lifting, pushing, pulling or carrying. .    If you have any questions or concerns, please don't hesitate to call.     Sincerely,    Molly Yu MD

## 2021-09-20 ENCOUNTER — TELEPHONE (OUTPATIENT)
Dept: ORTHOPEDIC SURGERY | Age: 61
End: 2021-09-20

## 2021-09-20 NOTE — TELEPHONE ENCOUNTER
HERRERA ELIAS / [facility] ( [facility] ) 03/13/2020 TO PRESENT TO JESÚS SZYMANSKI TO SCAN IN MRO TO SHARON [address]oss [employer] OFFICE

## 2021-09-21 ENCOUNTER — TELEPHONE (OUTPATIENT)
Dept: ORTHOPEDIC SURGERY | Age: 61
End: 2021-09-21

## 2021-09-21 NOTE — TELEPHONE ENCOUNTER
WC patient is requesting to  the 8/9/2021 office note tomorrow at the [facility].

## 2021-09-21 NOTE — TELEPHONE ENCOUNTER
Patient will need to sign pertinent forms for records release of which the  will assist him with then handle his request accordingly.

## 2021-10-04 ENCOUNTER — OFFICE VISIT (OUTPATIENT)
Dept: ORTHOPEDIC SURGERY | Age: 61
End: 2021-10-04
Payer: COMMERCIAL

## 2021-10-04 VITALS — BODY MASS INDEX: 33 KG/M2 | HEIGHT: 76 IN | WEIGHT: 271 LBS

## 2021-10-04 DIAGNOSIS — S46.011A TRAUMATIC COMPLETE TEAR OF RIGHT ROTATOR CUFF, INITIAL ENCOUNTER: Primary | ICD-10-CM

## 2021-10-04 PROCEDURE — 99213 OFFICE O/P EST LOW 20 MIN: CPT | Performed by: ORTHOPAEDIC SURGERY

## 2021-10-04 NOTE — PROGRESS NOTES
History:  Pina Law Sr. is here for follow up after right shoulder arthroscopic surgery. Surgery date was 11/6/20. Findings at surgery: large rotator cuff tear, type 2 SLAP tear. The patient's pain is rated at 5/10. He is a . He is here for an updated work note today. [facility] yet for approval for surgery yet. Physical Examination:  Ht 6' 4\" (1.93 m)   Wt 271 lb (122.9 kg)   BMI 32.99 kg/m²        Patient is awake, alert, and in no acute distress. MRI right shoulder from [facility] 7/14/21: Moderatesized fullthickness partial tear of the distal supraspinatus tendon. There is no tendon retraction. Tendinosis changes and intrasubstance fissuring at the myotendinous junction. Assessment:   1. Right shoulder rotator cuff tear  2.  11 months status post right shoulder arthroscopy, subacromial decompression, rotator cuff repair, and biceps tenodesis      Plan:   We discussed the MRI findings show rotator cuff tear. This is either retear, or failure to heal.    Again recommend right shoulder arthroscopy, revision rotator cuff repair with bioinductive implant. We will keep him on same work restrictions. Updated work note provided. [facility]ing [facility]. The patient will see their PCP for H&P and clearance for surgery.

## 2021-10-12 ENCOUNTER — OFFICE VISIT (OUTPATIENT)
Dept: PAIN MANAGEMENT | Age: 61
End: 2021-10-12
Payer: COMMERCIAL

## 2021-10-12 VITALS
OXYGEN SATURATION: 100 % | HEART RATE: 60 BPM | TEMPERATURE: 97.3 F | SYSTOLIC BLOOD PRESSURE: 142 MMHG | WEIGHT: 267.8 LBS | DIASTOLIC BLOOD PRESSURE: 89 MMHG | BODY MASS INDEX: 32.6 KG/M2

## 2021-10-12 DIAGNOSIS — M51.26 HNP (HERNIATED NUCLEUS PULPOSUS), LUMBAR: ICD-10-CM

## 2021-10-12 DIAGNOSIS — G89.4 CHRONIC PAIN SYNDROME: ICD-10-CM

## 2021-10-12 DIAGNOSIS — M54.16 LUMBAR RADICULOPATHY: ICD-10-CM

## 2021-10-12 DIAGNOSIS — S46.011S TRAUMATIC COMPLETE TEAR OF RIGHT ROTATOR CUFF, SEQUELA: ICD-10-CM

## 2021-10-12 DIAGNOSIS — M51.36 DDD (DEGENERATIVE DISC DISEASE), LUMBAR: ICD-10-CM

## 2021-10-12 PROCEDURE — 99214 OFFICE O/P EST MOD 30 MIN: CPT | Performed by: INTERNAL MEDICINE

## 2021-10-12 RX ORDER — NORTRIPTYLINE HYDROCHLORIDE 25 MG/1
25 CAPSULE ORAL NIGHTLY
Qty: 30 CAPSULE | Refills: 0 | Status: SHIPPED | OUTPATIENT
Start: 2021-10-12 | End: 2021-11-09 | Stop reason: ALTCHOICE

## 2021-10-12 RX ORDER — PREGABALIN 75 MG/1
75 CAPSULE ORAL 3 TIMES DAILY
Qty: 90 CAPSULE | Refills: 0 | Status: SHIPPED | OUTPATIENT
Start: 2021-10-12 | End: 2021-10-12 | Stop reason: CLARIF

## 2021-10-12 RX ORDER — PREGABALIN 75 MG/1
CAPSULE ORAL
Qty: 90 CAPSULE | Refills: 0 | Status: SHIPPED | OUTPATIENT
Start: 2021-10-12 | End: 2021-11-09 | Stop reason: ALTCHOICE

## 2021-10-12 RX ORDER — DULOXETIN HYDROCHLORIDE 30 MG/1
30 CAPSULE, DELAYED RELEASE ORAL DAILY
Qty: 30 CAPSULE | Refills: 0 | Status: SHIPPED | OUTPATIENT
Start: 2021-10-12 | End: 2021-11-09 | Stop reason: ALTCHOICE

## 2021-10-12 RX ORDER — CELECOXIB 200 MG/1
200 CAPSULE ORAL DAILY
Qty: 30 CAPSULE | Refills: 0 | Status: SHIPPED | OUTPATIENT
Start: 2021-10-12 | End: 2022-02-23 | Stop reason: SDUPTHER

## 2021-10-29 NOTE — PROGRESS NOTES
Mr. Erwin Roper is a 63 y.o. male who was seen consultation at the request of Dr. Magui Gilliam for pain management. Patient states that she been having pain in the right shoulder and back for the last 1-1/2 years states he injured it while working on a tractor trailer status post surgery to to fix it but states he usually tore his shoulder and continues to have pain in the shoulder region although states the back hurts more than the shoulder states he hurt his back also at the same time did physical therapy and chiropractic manipulation but still in pain has had no back surgeries no epidural steroid injection done some physical therapy he is also at the rotator cuff surgery pain in the back goes into the right leg occasionally left leg patient is a poor historian. Describes the pain is aching burning stabbing pain and sometimes pins and needle. Sleep is been poor denies any headaches does complain of morning stiffness complains some fatigue. States he is off work since March 2020 is on [employer]. used to be  states he is single denies any history of diabetes has been seen by chiropractor orthopedic surgeon physical therapist.  Symptoms started suddenly has a prior episodes which been treated medications physical therapy exercises chiropractic manipulation. Most of pain is mid back low back arm and some in the leg. Activities such as lifting bending twisting sitting straining reaching overhead getting up in the morning getting out of chair all cause him to have increased pain self with medication grades of pain 5-7/10 states he was on Indocin gives a history of high blood pressure cholesterol gout and takes Cialis for erectile dysfunction does use medications for the above conditions patient smokes about half a pack a day denies any alcohol abuse or drug abuse denies marijuana use.   Unemployed used to be a  does complain of numbness and tingling of the neck and arm and also weakness of the upper extremity some weakness and numbness of the lower extremity no instability baseline gait problems no history of falls ongoing pain symptoms of restricted social and recreational life  The patient's social history, past medical history, family history, medications, allergies and review of systems have all been reviewed and verified from  the patient questionnaire form which has been filled by the patient. The  allergies, medication list  and past medical and surgical history and other information recorded by the MA was again reviewed today  These forms have been scanned into the \"media\" tab of patients electronic medical record. PHYSICAL EXAM:  Please see the physical exam form for a detailed examination on this visit. This form has been completed and scanned into the  Media section of the chart  There is a middle-aged male well-built no apparent acute distress alert oriented x3 mood and affect is normal gait has a limp gross motor coordination is normal some Casi signs positive. Back and trunk exam shows flat back tenderness paralumbar region range of motion causes pain on extension motor strength over 5 sensory exams grossly unremarkable reflexes absent knees and ankle straight leg raising negative root tension sign femoral stretch and fabers test is negative  Examination of the upper extremity shows tenderness around the shoulder region range of motion restricted on the right side strength is 4/5 neurovascularly intact. BP (!) 142/89   Pulse 60   Temp 97.3 °F (36.3 °C) (Temporal)   Wt 267 lb 12.8 oz (121.5 kg)   SpO2 100%   BMI 32.60 kg/m²   Skin: Warm and dry. Good turgor. No rashes. No lesions or marks noted  Eyes:  PERRLA, cornea/ conjunctiva normal, no nystagmus  HENT:  Atraumatic, external ears normal, nose normal, oropharynx moist, no pharyngeal exudates. Neck- normal range of motion, no tenderness, supple. No bruit, no JVD, No lymph nodes appreciated.  Thyroid is not enlarged  Respiratory: Lungs CTAP, No respiratory distress, normal breath sounds, no rales, no wheezing   Cardiovascular:  Normal S1,S2, Normal rate, normal rhythm, no murmurs, no gallops, no rubs   GI:  Soft, nondistended, normal bowel sounds, nontender, no organomegaly, no mass, no rebound, no guarding  :  No costovertebral angle tenderness   Musculoskeletal:  No clubbing, no edema, no tenderness, no deformities. There are no varicosities  Lymphatic:  No lymphadenopathy noted   Neurologic:  Alert & oriented x 3, CN 2-12 normal, normal motor function, normal sensory function, no focal deficits noted   Psychiatric:  Speech and behavior appropriate, judgement and thought content normal.  There are tender spots of myofascial pain physical exam testing. Patient's old records were reviewed in detail records from primary care physician reviewed records from orthopedics reviewed MRI right shoulder shows full-thickness tear of supraspinatus tendon AC arthritis. Old MRI lumbar spine 2018 shows spondylosis L4-5. MRI from 2020 of the lumbar spine shows spinal listhesis L4-5 degenerative disc disease multiple levels with stenosis and foraminal and central.    IMPRESSION    CHRONIC PAIN SYNDROME  DEGENERATIVE DISC DISEASE LUMBAR SPINE  HERNIATED [place] [address] [place] LUMBAR SPINE  ROTATOR CUFF TEAR RIGHT SHOULDER  LUMBAR RADICULOPATHY    Chronic opiate treatment protocol was discussed with the patient. Informed verbal consent was obtained. Treatment guidelines were established. Risks and benefits of the medications including narcotics were discussed with the patient. SOAPP questionnaire and opioid risk tool were assessed. Long-term and short-term goals of pain management were also addressed including pain relief about 30% from baseline, improving mood, sleep, psychosocial, and physical functioning were addressed.   Start Celebrex 200 mg 1 a day Lyrica 75 titrate up to 205 mg nortriptyline 25 mg 1-2 at bedtime and Cymbalta 30 mg 1 a day blood test reviewed shows elevated AST hepatitis panel is pending advised physical therapy exercises will give referral for that will do urinedrug screen with GCMS opiates and follow-up on the results Patient profile on OAS website was reviewed education about chronic pain and myofascial pain was also given all treatment options were discussed with the patient. Goals of current treatment regimen include improvement in pain, restoration of functioning- with focus on improvement in physical performance, general activity, work or disability,emotional distress, health care utilization and  decreased medication consumption. Will continue to monitor progress towards achieving/maintaining therapeutic goals with special emphasis on  1. Improvement in perceived interfernce  of pain with ADL's. Ability to do home exercises independently. Ability to do household chores indoor and/or outdoor work and social and leisure activities. To increase flexibility/ROM, strength and endurance. Improve psychosocial and physical functioning. 2. Improving sleep to 6-7 hours a night. Improve mood/ anxiety and depression symptoms such as crying spells, low energy, problems with concentration, motivation. 3. Reduction of reliance on opioid analgesia/more appropriate opioid use. Risks and benefits of the medications and other alternative treatments have been/were  discussed with the patient. Any questions on the  common side effects of these medications were also answered. Informed verbal consent was obtained. The current treatment regimen is needed to decrease the patient's pain  symptoms, improve the quality of life and ability to function and improve the  sleep and mood symptoms.    Patient was advised against drinking alcohol with the narcotic pain medicines, advised against driving or handling machinery when  starting or adjusting the dose of medicines, feeling groggy or drowsy, or if having any cognitive issues related to the current medications. Patient is fully aware of the risk of overdose and death, if medicines are misused and not taken as prescribed. If Patient develops new symptoms or if the symptoms worsen,  was told to call the office. Thank you for allowing me to participate in the care of this patient. MD Monika Ochoaon disclaimer: This note was dictated utilizing voice recognition software. Minor errors in transcription may be present.     CC:  Myriam Rose MD

## 2021-11-09 ENCOUNTER — OFFICE VISIT (OUTPATIENT)
Dept: PAIN MANAGEMENT | Age: 61
End: 2021-11-09
Payer: COMMERCIAL

## 2021-11-09 VITALS
DIASTOLIC BLOOD PRESSURE: 89 MMHG | WEIGHT: 269 LBS | HEART RATE: 66 BPM | OXYGEN SATURATION: 99 % | SYSTOLIC BLOOD PRESSURE: 143 MMHG | BODY MASS INDEX: 32.74 KG/M2

## 2021-11-09 DIAGNOSIS — S46.011S TRAUMATIC COMPLETE TEAR OF RIGHT ROTATOR CUFF, SEQUELA: ICD-10-CM

## 2021-11-09 DIAGNOSIS — M54.16 LUMBAR RADICULOPATHY: ICD-10-CM

## 2021-11-09 DIAGNOSIS — M51.26 HNP (HERNIATED NUCLEUS PULPOSUS), LUMBAR: ICD-10-CM

## 2021-11-09 DIAGNOSIS — M51.36 DDD (DEGENERATIVE DISC DISEASE), LUMBAR: ICD-10-CM

## 2021-11-09 DIAGNOSIS — G89.4 CHRONIC PAIN SYNDROME: ICD-10-CM

## 2021-11-09 DIAGNOSIS — S46.011D TRAUMATIC COMPLETE TEAR OF RIGHT ROTATOR CUFF, SUBSEQUENT ENCOUNTER: ICD-10-CM

## 2021-11-09 PROCEDURE — 99214 OFFICE O/P EST MOD 30 MIN: CPT | Performed by: INTERNAL MEDICINE

## 2021-11-09 RX ORDER — OXYCODONE HYDROCHLORIDE AND ACETAMINOPHEN 5; 325 MG/1; MG/1
1 TABLET ORAL EVERY 6 HOURS PRN
Qty: 60 TABLET | Refills: 0 | Status: SHIPPED | OUTPATIENT
Start: 2021-11-09 | End: 2021-12-14 | Stop reason: SDUPTHER

## 2021-11-09 NOTE — PROGRESS NOTES
Kaleb Lemus .  1960  [id number]    HISTORY OF PRESENT ILLNESS:  Mr. Alireza Soni is a 63 y.o. male returns for a follow up visit for multiple medical problems. His current presenting problems are   1. Chronic pain syndrome    2. Traumatic complete tear of right rotator cuff, sequela    3. DDD (degenerative disc disease), lumbar    4. HNP (herniated nucleus pulposus), lumbar    5. Lumbar radiculopathy    . As per information/history obtained from the PADT(patient assessment and documentation tool) - He complains of pain in the lower back with radiation to the shoulders Right He rates the pain 5/10 and describes it as sharp, aching, numbness. Pain is made worse by: movement, walking, standing, sitting, bending. Current treatment regimen has helped relieve about 40% of the pain. He has sleeping all the time side effects from the current pain regimen. Patient reports that since the last follow up visit the physical functioning is unchanged, family/social relationships are unchanged, mood is unchanged and sleep patterns are worse, and that the overall functioning is unchanged. Patient denies neurological bowel or bladder. Patient denies misusing/abusing his narcotic pain medications or using any illegal drugs. There are No indicators for possible drug abuse, addiction or diversion problems. Upon obtaining the medical history from Mr. Alireza Soni regarding today's office visit for his presenting problems, patient states he has stopped taking all the medications, was making hip too sleepy. He says he has to go for surgery again for the shoulder. He reports his back is hurting also. He says he does not want to take all adjuvants medications. Patient states his sleep is fair. Has fairly normal sleep latency. Averages about 4-6 hours of sleep a night. Denies any signs of sleep apnea. Feels somewhat rested in the morning.         ALLERGIES/PAST MED/FAM/SOC HISTORY: Mr. Alireza Soni allergies, past medical, family and social history were reviewed in the chart. Mr. Kenneth Umaña current medications are   Outpatient Medications Prior to Visit   Medication Sig Dispense Refill    celecoxib (CELEBREX) 200 MG capsule Take 1 capsule by mouth daily 30 capsule 0    nortriptyline (PAMELOR) 25 MG capsule Take 1 capsule by mouth nightly 30 capsule 0    DULoxetine (CYMBALTA) 30 MG extended release capsule Take 1 capsule by mouth daily 30 capsule 0    pregabalin (LYRICA) 75 MG capsule One tab hs 1 week, 2 tabs hs 1 week, 1 tab am 2 tabs pm 90 capsule 0    allopurinol (ZYLOPRIM) 300 MG tablet Take 1 tablet by mouth daily 90 tablet 1    bisoprolol-hydroCHLOROthiazide (ZIAC) 10-6.25 MG per tablet TAKE ONE TABLET BY MOUTH TWICE A  tablet 1    atorvastatin (LIPITOR) 80 MG tablet TAKE 1 TABLET BY MOUTH ONE TIME A DAY 90 tablet 1    colchicine (COLCRYS) 0.6 MG tablet Take 1 tablet by mouth once daily 90 tablet 0    tadalafil (CIALIS) 5 MG tablet Take 1 tablet by mouth daily 90 tablet 1    promethazine (PHENERGAN) 25 MG tablet Take 1 tablet by mouth every 6 hours as needed for Nausea 5 tablet 0    loratadine (CLARITIN) 10 MG tablet Take 1 tablet by mouth daily 30 tablet 0     No facility-administered medications prior to visit. REVIEW OF SYSTEMS:     Respiratory: Negative for shortness of breath. Cardiovascular: Negative for chest pain, palpitations  Gastrointestinal: Negative for blood in stool, abdominal distention, nausea, vomiting, abdominal pain, diarrhea,constipation. Neurological: Negative for speech difficulty, weakness and light-headedness, dizziness, tremors, sleepiness  Psychiatric/Behavioral: Negative for suicidal ideas, hallucinations, behavioral problems, self-injury, decreased concentration/cognition, agitation, confusion. PHYSICAL EXAM:   Nursing note and vitals reviewed.  BP (!) 143/89   Pulse 66   Wt 269 lb (122 kg)   SpO2 99%   BMI 32.74 kg/m²   General Appearance: Patient is well nourished, well developed, well groomed and in no acute distress. Skin: Skin is warm and dry, good turgor . No rash or lesions noted. He is not diaphoretic. Pulmonary/Chest: Effort normal. No respiratory distress or use of accessory muscles. Auscultation revealing normal air entry. He does not have wheezes in the lung fields. He has no rales. Cardiovascular: Normal rate, regular rhythm, normal heart sounds, and does not have murmur. Exam reveals no gallop and no friction rub. Musculoskeletal / Extremities: Range of motion is normal. Gait is normal, assistive devices use: none. He exhibits edema: none, and no tenderness. Neurological/Psychiatric:He is alert and oriented to person, place, and time. Coordination is  normal.   Judgement and Insight is normal  His mood is Appropriate and affect is Neutral/Euthymic(normal) . His behavior is normal.   thought content normal.        IMPRESSION:     1. Chronic pain syndrome    2. Traumatic complete tear of right rotator cuff, sequela    3. DDD (degenerative disc disease), lumbar    4. HNP (herniated nucleus pulposus), lumbar    5. Lumbar radiculopathy        PLAN:  Informed verbal consent was obtained. -[place] record was obtained and reviewed  for the last one year and no indicators of drug misuse  were found. Any other controlled substance prescriptions  seen on the record have been accounted for, I am aware of the patient receiving these medications. [facility] [place] record will be rechecked as part of office protocol.    -Continue with current regimen except discontinue Pamelor, Cymbalta and Lyrica   -Continue with Celebrex   -Start percocet 5 mg 1/2 to 1 tablet Po BID as needed, wants this as it helped post surgery   -Patient's urine drug screen results with GC/MS confirmation were obtained and reviewed and were negative for any illicit drugs.  Prescribed medications were within acceptable range.   -He was advised weight reduction, diet changes- 800-1200 candida diet, diet diary, exercising, nutritional  consult increased physical activity as tolerated   -ROM/Stretching exercises as advised   Mr. Isha Conde will be prescribed  the medications  listed below which are for treatment of his presenting  medical problems which for this visit include:   Diagnoses of Chronic pain syndrome, Traumatic complete tear of right rotator cuff, sequela, DDD (degenerative disc disease), lumbar, HNP (herniated nucleus pulposus), lumbar, and Lumbar radiculopathy were pertinent to this visit. Medications/orders associated with this visit:    Current Outpatient Medications   Medication Sig Dispense Refill    celecoxib (CELEBREX) 200 MG capsule Take 1 capsule by mouth daily 30 capsule 0    nortriptyline (PAMELOR) 25 MG capsule Take 1 capsule by mouth nightly 30 capsule 0    DULoxetine (CYMBALTA) 30 MG extended release capsule Take 1 capsule by mouth daily 30 capsule 0    pregabalin (LYRICA) 75 MG capsule One tab hs 1 week, 2 tabs hs 1 week, 1 tab am 2 tabs pm 90 capsule 0    allopurinol (ZYLOPRIM) 300 MG tablet Take 1 tablet by mouth daily 90 tablet 1    bisoprolol-hydroCHLOROthiazide (ZIAC) 10-6.25 MG per tablet TAKE ONE TABLET BY MOUTH TWICE A  tablet 1    atorvastatin (LIPITOR) 80 MG tablet TAKE 1 TABLET BY MOUTH ONE TIME A DAY 90 tablet 1    colchicine (COLCRYS) 0.6 MG tablet Take 1 tablet by mouth once daily 90 tablet 0    tadalafil (CIALIS) 5 MG tablet Take 1 tablet by mouth daily 90 tablet 1    promethazine (PHENERGAN) 25 MG tablet Take 1 tablet by mouth every 6 hours as needed for Nausea 5 tablet 0    loratadine (CLARITIN) 10 MG tablet Take 1 tablet by mouth daily 30 tablet 0     No current facility-administered medications for this visit.         Goals of current treatment regimen include improvement in pain, restoration of functioning- with focus on improvement in physical performance, general activity, work or disability,emotional distress, health care utilization and  decreased medication consumption. Will continue to monitor progress towards achieving/maintaining therapeutic goals with special emphasis on  1. Improvement in perceived interfernce  of pain with ADL's. Ability to do home exercises independently. Ability to do household chores indoor and/or outdoor work and social and leisure activities. To increase flexibility/ROM, strength and endurance. Improve psychosocial and physical functioning.- he is not showing any significant progress/or showing regression  towards this goal and reassessment and adjustment of goals/treatment have been made. 2. Improving sleep to 6-7 hours a night. Improve mood/ anxiety and depression symptoms such as crying spells, low energy, problems with concentration, motivation.- he is showing progression towards this treatment goal with the current regimen. 3. Reduction of reliance on opioid analgesia/more appropriate opioid use. - he is showing progression towards this treatment goal with the current regimen. Risks and benefits of the medications and other alternative treatments have been/were  discussed with the patient. Any questions on the  common side effects of these medications were also answered. He was advised against drinking alcohol with the narcotic pain medicines, advised against driving or handling machinery when  starting or adjusting the dose of medicines, feeling groggy or drowsy, or if having any cognitive issues related to the current medications. Heis fully aware of the risk of overdose and death, if medicines are misused and not taken as prescribed. If he develops new symptoms or if the symptoms worsen, he was told to call the office. .  Thank you for allowing me to participate in the care of this patient.     Jacob Cook MD.    Cc: Karie Ayoub MD

## 2021-11-12 ENCOUNTER — OFFICE VISIT (OUTPATIENT)
Dept: PRIMARY CARE CLINIC | Age: 61
End: 2021-11-12

## 2021-11-12 VITALS
HEIGHT: 76 IN | SYSTOLIC BLOOD PRESSURE: 130 MMHG | TEMPERATURE: 97.2 F | WEIGHT: 271 LBS | BODY MASS INDEX: 33 KG/M2 | HEART RATE: 67 BPM | DIASTOLIC BLOOD PRESSURE: 82 MMHG | OXYGEN SATURATION: 99 %

## 2021-11-12 DIAGNOSIS — R10.9 FLANK PAIN: ICD-10-CM

## 2021-11-12 DIAGNOSIS — E78.00 PURE HYPERCHOLESTEROLEMIA: Primary | ICD-10-CM

## 2021-11-12 DIAGNOSIS — N52.9 ERECTILE DYSFUNCTION, UNSPECIFIED ERECTILE DYSFUNCTION TYPE: ICD-10-CM

## 2021-11-12 DIAGNOSIS — M10.9 GOUT, UNSPECIFIED CAUSE, UNSPECIFIED CHRONICITY, UNSPECIFIED SITE: ICD-10-CM

## 2021-11-12 DIAGNOSIS — Z12.11 SCREEN FOR COLON CANCER: ICD-10-CM

## 2021-11-12 DIAGNOSIS — I10 PRIMARY HYPERTENSION: ICD-10-CM

## 2021-11-12 DIAGNOSIS — M1A.0790 CHRONIC IDIOPATHIC GOUT INVOLVING TOE, UNSPECIFIED LATERALITY: ICD-10-CM

## 2021-11-12 DIAGNOSIS — R30.0 DYSURIA: ICD-10-CM

## 2021-11-12 DIAGNOSIS — R79.89 ABNORMAL LFTS (LIVER FUNCTION TESTS): ICD-10-CM

## 2021-11-12 PROCEDURE — 99214 OFFICE O/P EST MOD 30 MIN: CPT | Performed by: FAMILY MEDICINE

## 2021-11-12 RX ORDER — TADALAFIL 5 MG/1
5 TABLET ORAL DAILY
Qty: 90 TABLET | Refills: 1 | Status: SHIPPED | OUTPATIENT
Start: 2021-11-12 | End: 2022-02-15 | Stop reason: SDUPTHER

## 2021-11-12 RX ORDER — ALLOPURINOL 300 MG/1
300 TABLET ORAL DAILY
Qty: 90 TABLET | Refills: 1 | Status: SHIPPED | OUTPATIENT
Start: 2021-11-12 | End: 2022-02-15 | Stop reason: SDUPTHER

## 2021-11-12 NOTE — PROGRESS NOTES
Routine follow up  Visit    HPI  64 y/o male PMH HLD  HTN ED GOUT      He has hyperlipidemia  Stopped taking atorvastatin 80 mg  Last visit because of concern with labs  Liver testing 10/0/20  ALT 46  AST 46 both sl elevated    Liver panel  Hepatitis sc ordered last visit 7/2021 not done yet    He says he has  Been drinking more than  Normal due to being stressed out. Current for possible uti  Some times he gets R flank pain  No blood in urine  Urine does burn sometimes  Says he has not been sexually  For about a year      Physical Exam  Constitutional:       General: He is not in acute distress. Appearance: He is well-developed. He is not diaphoretic. HENT:      Head: Normocephalic and atraumatic. Right Ear: External ear normal.      Left Ear: External ear normal.      Nose: Nose normal.      Mouth/Throat:      Pharynx: No oropharyngeal exudate. Eyes:      General: No scleral icterus. Right eye: No discharge. Left eye: No discharge. Conjunctiva/sclera: Conjunctivae normal.      Pupils: Pupils are equal, round, and reactive to light. Neck:      Thyroid: No thyromegaly. Vascular: No JVD. Trachea: No tracheal deviation. Cardiovascular:      Rate and Rhythm: Normal rate and regular rhythm. Pulses:           Carotid pulses are 2+ on the right side and 2+ on the left side. Radial pulses are 2+ on the right side and 2+ on the left side. Femoral pulses are 2+ on the right side and 2+ on the left side. Popliteal pulses are 2+ on the right side and 2+ on the left side. Dorsalis pedis pulses are 2+ on the right side and 2+ on the left side. Posterior tibial pulses are 2+ on the right side and 2+ on the left side. Heart sounds: Normal heart sounds. No murmur heard. No friction rub. Pulmonary:      Effort: Pulmonary effort is normal. No respiratory distress. Breath sounds: Normal breath sounds. No stridor.  No wheezing or rales.   Chest:      Chest wall: No tenderness. Abdominal:      General: Bowel sounds are normal. There is no distension. Palpations: Abdomen is soft. There is no mass. Tenderness: There is no abdominal tenderness. There is no guarding or rebound. Musculoskeletal:         General: No tenderness. Normal range of motion. Cervical back: Normal range of motion and neck supple. Lymphadenopathy:      Cervical: No cervical adenopathy. Skin:     General: Skin is warm and dry. Coloration: Skin is not pale. Findings: No rash. Neurological:      Mental Status: He is oriented to person, place, and time. Cranial Nerves: No cranial nerve deficit. Motor: No abnormal muscle tone. Coordination: Coordination normal.      Deep Tendon Reflexes: Reflexes normal.   Psychiatric:         Behavior: Behavior normal.         Thought Content: Thought content normal.         Judgment: Judgment normal.       Lab Results   Component Value Date    CHOL 170 06/28/2019    CHOL 264 (H) 04/02/2018    CHOL 242 (H) 01/27/2017     Lab Results   Component Value Date    TRIG 113 06/28/2019    TRIG 174 (H) 04/02/2018    TRIG 104 01/27/2017     Lab Results   Component Value Date    HDL 74 (H) 07/13/2021    HDL 71 (H) 06/28/2019    HDL 68 (H) 04/02/2018     Lab Results   Component Value Date    LDLCALC 40 07/13/2021    LDLCALC 76 06/28/2019    LDLCALC 161 (H) 04/02/2018     Lab Results   Component Value Date    LABVLDL 22 07/13/2021    LABVLDL 23 06/28/2019    LABVLDL 35 04/02/2018     No results found for: CHOLHDLRATIO      1. Pure hypercholesterolemia  Last lipids at goal last taken  Off atorvastatin 80 mg since that time  Afraid lft elev due to this  No muscle aches  Will repeat lfts    2. Abnormal LFTs (liver function tests)  Likely he needs to refrain from ETOH  Will fu with liver tests in 2 weeks, can discuss restarting statin atorvastatin at that time  - HEPATIC FUNCTION PANEL;  Future  - HEPATITIS PANEL, ACUTE; Future    3. Erectile dysfunction, unspecified erectile dysfunction type  refill  - tadalafil (CIALIS) 5 MG tablet; Take 1 tablet by mouth daily  Dispense: 90 tablet; Refill: 1    4. Primary hypertension  Re ck bp ok  Low salt diet    5. Dysuria  Dipstick not consistent with uti    - POCT Urinalysis no Micro  - C.trachomatis N.gonorrhoeae DNA, Urine; Future  - Culture, Urine    6. Flank pain    - POCT Urinalysis no Micro  - C.trachomatis N.gonorrhoeae DNA, Urine; Future  - Culture, Urine    7. Chronic idiopathic gout involving toe, unspecified laterality    - allopurinol (ZYLOPRIM) 300 MG tablet; Take 1 tablet by mouth daily  Dispense: 90 tablet; Refill: 1    8. Gout, unspecified cause, unspecified chronicity, unspecified site      9. Screen for colon cancer    - POCT Fecal Immunochemical Test (FIT);  Future      2 weeks

## 2021-12-14 ENCOUNTER — OFFICE VISIT (OUTPATIENT)
Dept: PAIN MANAGEMENT | Age: 61
End: 2021-12-14
Payer: COMMERCIAL

## 2021-12-14 VITALS
WEIGHT: 270 LBS | OXYGEN SATURATION: 99 % | BODY MASS INDEX: 32.88 KG/M2 | HEIGHT: 76 IN | DIASTOLIC BLOOD PRESSURE: 77 MMHG | RESPIRATION RATE: 16 BRPM | SYSTOLIC BLOOD PRESSURE: 121 MMHG | HEART RATE: 68 BPM

## 2021-12-14 DIAGNOSIS — S46.011D TRAUMATIC COMPLETE TEAR OF RIGHT ROTATOR CUFF, SUBSEQUENT ENCOUNTER: ICD-10-CM

## 2021-12-14 DIAGNOSIS — M51.26 HNP (HERNIATED NUCLEUS PULPOSUS), LUMBAR: ICD-10-CM

## 2021-12-14 DIAGNOSIS — M51.36 DDD (DEGENERATIVE DISC DISEASE), LUMBAR: ICD-10-CM

## 2021-12-14 DIAGNOSIS — M54.16 LUMBAR RADICULOPATHY: ICD-10-CM

## 2021-12-14 DIAGNOSIS — G89.4 CHRONIC PAIN SYNDROME: ICD-10-CM

## 2021-12-14 DIAGNOSIS — S46.011S TRAUMATIC COMPLETE TEAR OF RIGHT ROTATOR CUFF, SEQUELA: ICD-10-CM

## 2021-12-14 PROCEDURE — 99213 OFFICE O/P EST LOW 20 MIN: CPT | Performed by: INTERNAL MEDICINE

## 2021-12-14 RX ORDER — OXYCODONE HYDROCHLORIDE AND ACETAMINOPHEN 5; 325 MG/1; MG/1
1 TABLET ORAL EVERY 6 HOURS PRN
Qty: 60 TABLET | Refills: 0 | Status: SHIPPED | OUTPATIENT
Start: 2021-12-14 | End: 2022-02-23 | Stop reason: SDUPTHER

## 2021-12-14 NOTE — PROGRESS NOTES
Madhavi Kelly .  1960  [id number]    HISTORY OF PRESENT ILLNESS:  Mr. Yadi Turner is a 63 y.o. male returns for a follow up visit for multiple medical problems. His current presenting problems are   1. Chronic pain syndrome    2. DDD (degenerative disc disease), lumbar    3. Lumbar radiculopathy    4. Traumatic complete tear of right rotator cuff, sequela    5. HNP (herniated nucleus pulposus), lumbar    6. Traumatic complete tear of right rotator cuff, subsequent encounter    . As per information/history obtained from the PADT(patient assessment and documentation tool) - He complains of pain in the shoulders Right with radiation to the lower back He rates the pain 5/10 and describes it as pins and needles. Pain is made worse by: movement. Current treatment regimen has helped relieve about 50% of the pain. He denies side effects from the current pain regimen. Patient reports that since the last follow up visit the physical functioning is unchanged, family/social relationships are unchanged, mood is unchanged and sleep patterns are better, and that the overall functioning is unchanged. Patient denies neurological bowel or bladder. Patient denies misusing/abusing his narcotic pain medications or using any illegal drugs. There are No indicators for possible drug abuse, addiction or diversion problems. Upon obtaining the medical history from Mr. Yadi Turner regarding today's office visit for his presenting problems, patient states he has been doing fair, has been using Percocet 1-2 per day along with Celebrex. He mentions his weight has been stable and he is managing his ADls. He reports he is managing his house chores also. ALLERGIES: Patients list of allergies were reviewed     MEDICATIONS: Mr. Yadi Turner list of medications were reviewed. His current medications are   Outpatient Medications Prior to Visit   Medication Sig Dispense Refill    tadalafil (CIALIS) 5 MG tablet Take 1 tablet by mouth daily 90 tablet 1    allopurinol (ZYLOPRIM) 300 MG tablet Take 1 tablet by mouth daily 90 tablet 1    oxyCODONE-acetaminophen (PERCOCET) 5-325 MG per tablet Take 1 tablet by mouth every 6 hours as needed for Pain (max 1-2 per day) for up to 35 days. 60 tablet 0    celecoxib (CELEBREX) 200 MG capsule Take 1 capsule by mouth daily 30 capsule 0    bisoprolol-hydroCHLOROthiazide (ZIAC) 10-6.25 MG per tablet TAKE ONE TABLET BY MOUTH TWICE A  tablet 1    atorvastatin (LIPITOR) 80 MG tablet TAKE 1 TABLET BY MOUTH ONE TIME A DAY 90 tablet 1    colchicine (COLCRYS) 0.6 MG tablet Take 1 tablet by mouth once daily 90 tablet 0    promethazine (PHENERGAN) 25 MG tablet Take 1 tablet by mouth every 6 hours as needed for Nausea 5 tablet 0    loratadine (CLARITIN) 10 MG tablet Take 1 tablet by mouth daily 30 tablet 0     No facility-administered medications prior to visit. REVIEW OF SYSTEMS:    Respiratory: Negative for apnea, chest tightness and shortness of breath or change in baseline breathing. PHYSICAL EXAM:   Nursing note and vitals reviewed. /77   Pulse 68   Resp 16   Ht 6' 4\" (1.93 m)   Wt 270 lb (122.5 kg)   SpO2 99%   BMI 32.87 kg/m²   Constitutional: He appears well-developed and well-nourished. No acute distress. Cardiovascular: Normal rate, regular rhythm, normal heart sounds, and does not have murmur. Pulmonary/Chest: Effort normal. No respiratory distress. He does not have wheezes in the lung fields. He has no rales. Neurological/Psychiatric:He is alert and oriented to person, place, and time. Coordination is  normal.  His mood isAppropriate and affect is Neutral/Euthymic(normal) . IMPRESSION:   1. Chronic pain syndrome    2. DDD (degenerative disc disease), lumbar    3. Lumbar radiculopathy    4. Traumatic complete tear of right rotator cuff, sequela    5. HNP (herniated nucleus pulposus), lumbar    6.  Traumatic complete tear of right rotator cuff, subsequent encounter PLAN:  Informed verbal consent was obtained  -[place] record was obtained and reviewed  for the last one year and no indicators of drug misuse  were found. Any other controlled substance prescriptions  seen on the record have been accounted for, I am aware of the patient receiving these medications. [facility] [place] record will be rechecked as part of office protocol. -ROM/stretching exercises as advised   -He was advised to increase fluids ( 5-7  glasses of fluid daily), limit caffeine, avoid cheese products, increase dietary fiber, increase activity and exercise as tolerated and relax regularly and enjoy meals   -Walking as tolerated   -Continue with Percocet 1-2 per day   Current Outpatient Medications   Medication Sig Dispense Refill    tadalafil (CIALIS) 5 MG tablet Take 1 tablet by mouth daily 90 tablet 1    allopurinol (ZYLOPRIM) 300 MG tablet Take 1 tablet by mouth daily 90 tablet 1    oxyCODONE-acetaminophen (PERCOCET) 5-325 MG per tablet Take 1 tablet by mouth every 6 hours as needed for Pain (max 1-2 per day) for up to 35 days. 60 tablet 0    celecoxib (CELEBREX) 200 MG capsule Take 1 capsule by mouth daily 30 capsule 0    bisoprolol-hydroCHLOROthiazide (ZIAC) 10-6.25 MG per tablet TAKE ONE TABLET BY MOUTH TWICE A  tablet 1    atorvastatin (LIPITOR) 80 MG tablet TAKE 1 TABLET BY MOUTH ONE TIME A DAY 90 tablet 1    colchicine (COLCRYS) 0.6 MG tablet Take 1 tablet by mouth once daily 90 tablet 0    promethazine (PHENERGAN) 25 MG tablet Take 1 tablet by mouth every 6 hours as needed for Nausea 5 tablet 0    loratadine (CLARITIN) 10 MG tablet Take 1 tablet by mouth daily 30 tablet 0     No current facility-administered medications for this visit. I will continue his current medication regimen  which is part of the above treatment schedule. It has been helping with Mr. Jesus's chronic  medical problems which for this visit include:   Diagnoses of Chronic pain syndrome, DDD (degenerative disc disease), lumbar, Lumbar radiculopathy, Traumatic complete tear of right rotator cuff, sequela, HNP (herniated nucleus pulposus), lumbar, and Traumatic complete tear of right rotator cuff, subsequent encounter were pertinent to this visit. Risks and benefits of the medications and other alternative treatments  including no treatment were discussed with the patient. The common side effects of these medications were also explained to the patient. Informed verbal consent was obtained. Goals of current treatment regimen include improvement in pain, restoration of functioning- with focus on improvement in physical performance, general activity, work or disability,emotional distress, health care utilization and  decreased medication consumption. Will continue to monitor progress towards achieving/maintaining therapeutic goals with special emphasis on  1. Improvement in perceived interfernce  of pain with ADL's. Ability to do home exercises independently. Ability to do household chores indoor and/or outdoor work and social and leisure activities. Improve psychosocial and physical functioning. - he is showing progression towards this treatment goal with the current regimen. He was advised against drinking alcohol with the narcotic pain medicines, advised against driving or handling machinery while adjusting the dose of medicines or if having cognitive  issues related to the current medications. Risk of overdose and death, if medicines not taken as prescribed, were also discussed. If the patient develops new symptoms or if the symptoms worsen, the patient should call the office. While transcribing every attempt was made to maintain the accuracy of the note in terms of it's contents,there may have been some errors made inadvertently. Thank you for allowing me to participate in the care of this patient.     Daniel Roldan MD.    Cc: Katelynn Aldana MD

## 2021-12-20 ENCOUNTER — OFFICE VISIT (OUTPATIENT)
Dept: ORTHOPEDIC SURGERY | Age: 61
End: 2021-12-20
Payer: COMMERCIAL

## 2021-12-20 VITALS — RESPIRATION RATE: 16 BRPM | HEIGHT: 76 IN | WEIGHT: 270.4 LBS | BODY MASS INDEX: 32.93 KG/M2

## 2021-12-20 DIAGNOSIS — S46.011D TRAUMATIC COMPLETE TEAR OF RIGHT ROTATOR CUFF, SUBSEQUENT ENCOUNTER: Primary | ICD-10-CM

## 2021-12-20 PROCEDURE — 99213 OFFICE O/P EST LOW 20 MIN: CPT | Performed by: ORTHOPAEDIC SURGERY

## 2021-12-20 NOTE — PROGRESS NOTES
History:  Brianna Langston Sr. is here for follow up after right shoulder arthroscopic surgery. Surgery date was 11/6/20. Findings at surgery: large rotator cuff tear, type 2 SLAP tear. The patient's pain is rated at 1-2/10. He is a . We received an email from [employer] [facility] stating that surgery is not approved at this time. Patient did not know this. Physical Examination:  Resp 16   Ht 6' 4\" (1.93 m)   Wt 270 lb 6.4 oz (122.7 kg)   BMI 32.91 kg/m²        Patient is awake, alert, and in no acute distress. MRI right shoulder from [email or web address] 7/14/21: Moderate-sized full-thickness partial tear of the distal supraspinatus tendon. There is no tendon retraction. Tendinosis changes and intrasubstance fissuring at the myotendinous junction. Assessment:   1. Right shoulder rotator cuff tear  2.  13 months status post right shoulder arthroscopy, subacromial decompression, rotator cuff repair, and biceps tenodesis      Plan:   We [facility] [employer] [facility]. It is not clear if surgery is just not yet approved, order was denied. I recommended he touch base with his . Again recommend right shoulder arthroscopy, revision rotator cuff repair with bioinductive implant. We will keep him on same work restrictions. Updated work note provided. Awaiting surgery approval from [employer] [facility]. The patient will see their PCP for H&P and clearance for surgery.

## 2021-12-20 NOTE — LETTER
[facility]  [place] [address] [address] [address]  Phone: [phone]  Fax: [phone]    Félix Friend MD        December 20, 2021     Patient: Violette Otto Sr. 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 · No lifting, pushing, pulling or carrying. · No overhead activity. · Restrictions in place for 3 months  If you have any questions or concerns, please don't hesitate to call.     Sincerely,    Félix Friend MD

## 2022-02-01 ENCOUNTER — TELEPHONE (OUTPATIENT)
Dept: ORTHOPEDIC SURGERY | Age: 62
End: 2022-02-01

## 2022-02-10 ENCOUNTER — TELEPHONE (OUTPATIENT)
Dept: ORTHOPEDIC SURGERY | Age: 62
End: 2022-02-10

## 2022-02-11 ENCOUNTER — TELEPHONE (OUTPATIENT)
Dept: PRIMARY CARE CLINIC | Age: 62
End: 2022-02-11

## 2022-02-11 DIAGNOSIS — R79.89 ABNORMAL LFTS (LIVER FUNCTION TESTS): ICD-10-CM

## 2022-02-11 LAB
ALBUMIN SERPL-MCNC: 4.3 G/DL (ref 3.4–5)
ALP BLD-CCNC: 88 U/L (ref 40–129)
ALT SERPL-CCNC: 66 U/L (ref 10–40)
AST SERPL-CCNC: 86 U/L (ref 15–37)
BILIRUB SERPL-MCNC: 0.6 MG/DL (ref 0–1)
BILIRUBIN DIRECT: <0.2 MG/DL (ref 0–0.3)
BILIRUBIN, INDIRECT: ABNORMAL MG/DL (ref 0–1)
HAV IGM SER IA-ACNC: NORMAL
HEPATITIS B CORE IGM ANTIBODY: NORMAL
HEPATITIS B SURFACE ANTIGEN INTERPRETATION: NORMAL
HEPATITIS C ANTIBODY INTERPRETATION: NORMAL
TOTAL PROTEIN: 6.9 G/DL (ref 6.4–8.2)

## 2022-02-14 NOTE — TELEPHONE ENCOUNTER
Called and left message on machine informing pt that need to call the office and set up an appointment to go over labs

## 2022-02-15 ENCOUNTER — OFFICE VISIT (OUTPATIENT)
Dept: PRIMARY CARE CLINIC | Age: 62
End: 2022-02-15

## 2022-02-15 VITALS
WEIGHT: 266 LBS | SYSTOLIC BLOOD PRESSURE: 129 MMHG | BODY MASS INDEX: 32.39 KG/M2 | DIASTOLIC BLOOD PRESSURE: 81 MMHG | TEMPERATURE: 96.9 F | HEIGHT: 76 IN | HEART RATE: 63 BPM | OXYGEN SATURATION: 98 %

## 2022-02-15 DIAGNOSIS — E78.00 PURE HYPERCHOLESTEROLEMIA: ICD-10-CM

## 2022-02-15 DIAGNOSIS — R10.11 RUQ PAIN: Primary | ICD-10-CM

## 2022-02-15 DIAGNOSIS — R79.89 ELEVATED LFTS: ICD-10-CM

## 2022-02-15 DIAGNOSIS — N52.9 ERECTILE DYSFUNCTION, UNSPECIFIED ERECTILE DYSFUNCTION TYPE: ICD-10-CM

## 2022-02-15 DIAGNOSIS — Z12.11 SCREEN FOR COLON CANCER: ICD-10-CM

## 2022-02-15 DIAGNOSIS — M1A.0790 CHRONIC IDIOPATHIC GOUT INVOLVING TOE, UNSPECIFIED LATERALITY: ICD-10-CM

## 2022-02-15 DIAGNOSIS — F10.10 ETOH ABUSE: ICD-10-CM

## 2022-02-15 DIAGNOSIS — I10 ESSENTIAL HYPERTENSION: ICD-10-CM

## 2022-02-15 PROCEDURE — 99214 OFFICE O/P EST MOD 30 MIN: CPT | Performed by: FAMILY MEDICINE

## 2022-02-15 RX ORDER — ALLOPURINOL 300 MG/1
300 TABLET ORAL DAILY
Qty: 90 TABLET | Refills: 1 | Status: SHIPPED | OUTPATIENT
Start: 2022-02-15 | End: 2022-07-27

## 2022-02-15 RX ORDER — ATORVASTATIN CALCIUM 80 MG/1
TABLET, FILM COATED ORAL
Qty: 90 TABLET | Refills: 1 | Status: SHIPPED | OUTPATIENT
Start: 2022-02-15 | End: 2022-08-26 | Stop reason: SDUPTHER

## 2022-02-15 RX ORDER — BISOPROLOL FUMARATE AND HYDROCHLOROTHIAZIDE 10; 6.25 MG/1; MG/1
TABLET ORAL
Qty: 180 TABLET | Refills: 1 | Status: SHIPPED | OUTPATIENT
Start: 2022-02-15 | End: 2022-08-26

## 2022-02-15 RX ORDER — TADALAFIL 5 MG/1
5 TABLET ORAL DAILY
Qty: 90 TABLET | Refills: 1 | Status: SHIPPED | OUTPATIENT
Start: 2022-02-15 | End: 2022-08-26 | Stop reason: SDUPTHER

## 2022-02-15 NOTE — PROGRESS NOTES
CC abnormal labs    HPI  64 y/o male known gouty arthritis, hypertension, ed and hyperlipidemia who  Is follow up for abnormal labs    Interesting he does admit to RUQ for about  3 months duration. It is mod and does not change. present day and night. No nausea or vomiting. Fatty or spicy foods do not bother. He drinks about 2/5 th of GIN a week. Beer about 3 36 0Z a week  No wine     No h/o gallbladder or liver disease       Gouty arthritis  No recent flare    Hypertension  No chest pain, headache, sob, leg edema, stroke, kidney disease     ED  Able to get erection  With current treatment    Hyperlipidemia   No muscle aches or muscle weakness or cramps since last visit. Physical Exam  Constitutional:       General: He is not in acute distress. Appearance: He is well-developed. He is not diaphoretic. HENT:      Head: Normocephalic and atraumatic. Right Ear: External ear normal.      Left Ear: External ear normal.      Nose: Nose normal.      Mouth/Throat:      Pharynx: No oropharyngeal exudate. Eyes:      General: No scleral icterus. Right eye: No discharge. Left eye: No discharge. Conjunctiva/sclera: Conjunctivae normal.      Pupils: Pupils are equal, round, and reactive to light. Neck:      Thyroid: No thyromegaly. Vascular: No JVD. Trachea: No tracheal deviation. Cardiovascular:      Rate and Rhythm: Normal rate and regular rhythm. Pulses:           Carotid pulses are 2+ on the right side and 2+ on the left side. Radial pulses are 2+ on the right side and 2+ on the left side. Femoral pulses are 2+ on the right side and 2+ on the left side. Popliteal pulses are 2+ on the right side and 2+ on the left side. Dorsalis pedis pulses are 2+ on the right side and 2+ on the left side. Posterior tibial pulses are 2+ on the right side and 2+ on the left side. Heart sounds: Normal heart sounds. No murmur heard. No friction rub. Pulmonary:      Effort: Pulmonary effort is normal. No respiratory distress. Breath sounds: Normal breath sounds. No stridor. No wheezing or rales. Chest:      Chest wall: No tenderness. Abdominal:      General: Bowel sounds are normal. There is no distension. Palpations: Abdomen is soft. There is no mass. Tenderness: There is no abdominal tenderness. There is no guarding or rebound. Comments: No hepatomegaly  No abdom pain   Musculoskeletal:         General: No tenderness. Normal range of motion. Cervical back: Normal range of motion and neck supple. Lymphadenopathy:      Cervical: No cervical adenopathy. Skin:     General: Skin is warm and dry. Coloration: Skin is not pale. Findings: No rash. Neurological:      Mental Status: He is oriented to person, place, and time. Cranial Nerves: No cranial nerve deficit. Motor: No abnormal muscle tone. Coordination: Coordination normal.      Deep Tendon Reflexes: Reflexes normal.   Psychiatric:         Behavior: Behavior normal.         Thought Content: Thought content normal.         Judgment: Judgment normal.               Lab Results   Component Value Date    CREATININE 0.8 (L) 07/13/2021    BUN 12 07/13/2021     07/13/2021    K 3.6 07/13/2021     07/13/2021    CO2 21 07/13/2021   . ALT 66  AST 86        1. RUQ pain  Wit elevated lift's   R/o gallbladder vs etoh abuse vs fatty liver disease  - US GALLBLADDER RUQ; Future  - HEPATIC FUNCTION PANEL; Future    2. Elevated LFTs  R/o 2/2 to etoh vs fatty liver vs gallbladder disease, stop etoh consumption  Low fat diet  - US GALLBLADDER RUQ; Future  - HEPATIC FUNCTION PANEL; Future    3. ETOH abuse  See 1, 2, urged to cut back or stop etoh intak    - HEPATIC FUNCTION PANEL; Future    4. Chronic idiopathic gout involving toe, unspecified laterality  No recent flare  cont  - allopurinol (ZYLOPRIM) 300 MG tablet;  Take 1 tablet by mouth daily  Dispense: 90

## 2022-02-23 ENCOUNTER — OFFICE VISIT (OUTPATIENT)
Dept: PAIN MANAGEMENT | Age: 62
End: 2022-02-23
Payer: COMMERCIAL

## 2022-02-23 VITALS
SYSTOLIC BLOOD PRESSURE: 134 MMHG | DIASTOLIC BLOOD PRESSURE: 76 MMHG | BODY MASS INDEX: 32.26 KG/M2 | HEART RATE: 58 BPM | WEIGHT: 265 LBS | OXYGEN SATURATION: 98 %

## 2022-02-23 DIAGNOSIS — G89.4 CHRONIC PAIN SYNDROME: ICD-10-CM

## 2022-02-23 DIAGNOSIS — M51.26 HNP (HERNIATED NUCLEUS PULPOSUS), LUMBAR: ICD-10-CM

## 2022-02-23 DIAGNOSIS — M54.16 LUMBAR RADICULOPATHY: ICD-10-CM

## 2022-02-23 DIAGNOSIS — M51.36 DDD (DEGENERATIVE DISC DISEASE), LUMBAR: ICD-10-CM

## 2022-02-23 PROCEDURE — 99213 OFFICE O/P EST LOW 20 MIN: CPT | Performed by: INTERNAL MEDICINE

## 2022-02-23 RX ORDER — OXYCODONE HYDROCHLORIDE AND ACETAMINOPHEN 5; 325 MG/1; MG/1
1 TABLET ORAL EVERY 6 HOURS PRN
Qty: 60 TABLET | Refills: 0 | Status: SHIPPED | OUTPATIENT
Start: 2022-02-23 | End: 2022-03-30 | Stop reason: SDUPTHER

## 2022-02-23 RX ORDER — CELECOXIB 200 MG/1
200 CAPSULE ORAL DAILY
Qty: 30 CAPSULE | Refills: 0 | Status: SHIPPED | OUTPATIENT
Start: 2022-02-23 | End: 2022-03-30 | Stop reason: SDUPTHER

## 2022-02-23 NOTE — PROGRESS NOTES
Sandro Garza .  1960  [id number]    HISTORY OF PRESENT ILLNESS:  Mr. Lonny Fonseca is a 63 y.o. male returns for a follow up visit for multiple medical problems. His current presenting problems are   1. Chronic pain syndrome    2. Lumbar radiculopathy    3. HNP (herniated nucleus pulposus), lumbar    4. DDD (degenerative disc disease), lumbar    5. Traumatic complete tear of right rotator cuff, sequela    6. Traumatic complete tear of right rotator cuff, subsequent encounter    . As per information/history obtained from the PADT(patient assessment and documentation tool) - He complains of pain in the lower back. He rates the pain 5/10 and describes it as aching, numbness, with an occasional shooting down legs Bilateral.  Pain is made worse by: standing, bending, lifting. Current treatment regimen has helped relieve about 30% of the pain. He denies side effects from the current pain regimen. Patient reports that since the last follow up visit the physical functioning is unchanged, family/social relationships are unchanged, mood is unchanged and sleep patterns are unchanged, and that the overall functioning is unchanged. Patient denies neurological bowel or bladder. Patient denies misusing/abusing his narcotic pain medications or using any illegal drugs. There are No indicators for possible drug abuse, addiction or diversion problems. Upon obtaining the medical history from Mr. Lonny Fonseac regarding today's office visit for his presenting problems, patient states he has been doing fair, pain is worse in the back + soreness. He says he has been using Celebrex along with Percocet. He denies any side effects. He reports he is managing her ADLs and house chores. He states his weight has been stable. ALLERGIES: Patients list of allergies were reviewed     MEDICATIONS: Mr. Lonny Fonseca list of medications were reviewed. His current medications are   Outpatient Medications Prior to Visit   Medication Sig Dispense Refill  allopurinol (ZYLOPRIM) 300 MG tablet Take 1 tablet by mouth daily 90 tablet 1    tadalafil (CIALIS) 5 MG tablet Take 1 tablet by mouth daily 90 tablet 1    bisoprolol-hydroCHLOROthiazide (ZIAC) 10-6.25 MG per tablet TAKE ONE TABLET BY MOUTH TWICE A  tablet 1    atorvastatin (LIPITOR) 80 MG tablet TAKE 1 TABLET BY MOUTH ONE TIME A DAY 90 tablet 1    colchicine (COLCRYS) 0.6 MG tablet Take 1 tablet by mouth once daily 90 tablet 0    promethazine (PHENERGAN) 25 MG tablet Take 1 tablet by mouth every 6 hours as needed for Nausea 5 tablet 0    loratadine (CLARITIN) 10 MG tablet Take 1 tablet by mouth daily 30 tablet 0    celecoxib (CELEBREX) 200 MG capsule Take 1 capsule by mouth daily 30 capsule 0     No facility-administered medications prior to visit. REVIEW OF SYSTEMS:    Respiratory: Negative for apnea, chest tightness and shortness of breath or change in baseline breathing. PHYSICAL EXAM:   Nursing note and vitals reviewed. /76   Pulse 58   Wt 265 lb (120.2 kg)   SpO2 98%   BMI 32.26 kg/m²   Constitutional: He appears well-developed and well-nourished. No acute distress. Cardiovascular: Normal rate, regular rhythm, normal heart sounds, and does not have murmur. Pulmonary/Chest: Effort normal. No respiratory distress. He does not have wheezes in the lung fields. He has no rales. Neurological/Psychiatric:He is alert and oriented to person, place, and time. Coordination is  normal.  His mood isAppropriate and affect is Neutral/Euthymic(normal) . IMPRESSION:   1. Chronic pain syndrome    2. Lumbar radiculopathy    3. HNP (herniated nucleus pulposus), lumbar    4. DDD (degenerative disc disease), lumbar        PLAN:  Informed verbal consent was obtained  -[place] record was obtained and reviewed  for the last one year and no indicators of drug misuse  were found.  Any other controlled substance prescriptions  seen on the record have been accounted for, I am aware of the patient receiving these medications. Ming Wilson [place] record will be rechecked as part of office protocol. -ROM/Stretching exercises as advised   -He was advised to increase fluids ( 5-7  glasses of fluid daily), limit caffeine, avoid cheese products, increase dietary fiber, increase activity and exercise as tolerated and relax regularly and enjoy meals   -Continue with Percocet 1-2 per day along with Celebrex    Current Outpatient Medications   Medication Sig Dispense Refill    oxyCODONE-acetaminophen (PERCOCET) 5-325 MG per tablet Take 1 tablet by mouth every 6 hours as needed for Pain (max 1-2 per day) for up to 35 days. 60 tablet 0    celecoxib (CELEBREX) 200 MG capsule Take 1 capsule by mouth daily 30 capsule 0    allopurinol (ZYLOPRIM) 300 MG tablet Take 1 tablet by mouth daily 90 tablet 1    tadalafil (CIALIS) 5 MG tablet Take 1 tablet by mouth daily 90 tablet 1    bisoprolol-hydroCHLOROthiazide (ZIAC) 10-6.25 MG per tablet TAKE ONE TABLET BY MOUTH TWICE A  tablet 1    atorvastatin (LIPITOR) 80 MG tablet TAKE 1 TABLET BY MOUTH ONE TIME A DAY 90 tablet 1    colchicine (COLCRYS) 0.6 MG tablet Take 1 tablet by mouth once daily 90 tablet 0    promethazine (PHENERGAN) 25 MG tablet Take 1 tablet by mouth every 6 hours as needed for Nausea 5 tablet 0    loratadine (CLARITIN) 10 MG tablet Take 1 tablet by mouth daily 30 tablet 0     No current facility-administered medications for this visit. I will continue his current medication regimen  which is part of the above treatment schedule. It has been helping with Mr. Jesus's chronic  medical problems which for this visit include:   Diagnoses of Chronic pain syndrome, Lumbar radiculopathy, HNP (herniated nucleus pulposus), lumbar, DDD (degenerative disc disease), lumbar, Traumatic complete tear of right rotator cuff, sequela, and Traumatic complete tear of right rotator cuff, subsequent encounter were pertinent to this visit.    Risks and benefits of the medications and other alternative treatments  including no treatment were discussed with the patient. The common side effects of these medications were also explained to the patient. Informed verbal consent was obtained. Goals of current treatment regimen include improvement in pain, restoration of functioning- with focus on improvement in physical performance, general activity, work or disability,emotional distress, health care utilization and  decreased opioid medication consumption- titrating to the lowest effective dose. Will continue to monitor progress towards achieving/maintaining therapeutic goals with special emphasis on  1. Improvement in perceived interfernce  of pain with ADL's. Ability to do home exercises independently. Ability to do household chores indoor and/or outdoor work and social and leisure activities. Improve psychosocial and physical functioning. - he is showing progression towards this treatment goal with the current regimen. He was advised against drinking alcohol with the narcotic pain medicines, advised against driving or handling machinery while adjusting the dose of medicines or if having cognitive  issues related to the current medications. Risk of overdose and death, if medicines not taken as prescribed, were also discussed. If the patient develops new symptoms or if the symptoms worsen, the patient should call the office. While transcribing every attempt was made to maintain the accuracy of the note in terms of it's contents,there may have been some errors made inadvertently. Thank you for allowing me to participate in the care of this patient.     Yarely Piper MD.    Cc: Pratibha Sosa MD

## 2022-02-28 ENCOUNTER — TELEPHONE (OUTPATIENT)
Dept: ADMINISTRATIVE | Age: 62
End: 2022-02-28

## 2022-02-28 NOTE — TELEPHONE ENCOUNTER
The pharmacy is trying to run his medications through his [facility]. Please notify the to use his primary insurance. If this requires a response please respond to the pool ( [facility] [address]). Thank you please advise patient.

## 2022-03-01 NOTE — TELEPHONE ENCOUNTER
LONG DISCUSSION WITH PATIENT  HE HAS [employer] ONLY    HE HAS TO PAY OUT OF POCKET FOR HIS NON  WORKERS COMP MEDS, HE IS AWARE OF THIS    NO ACTION NEEDED

## 2022-03-01 NOTE — TELEPHONE ENCOUNTER
Called Pharmacy at [phone] and spoke with Erica Zuleta. Per rep All they have is the [facility] info member ID: [id number] ;BIN: [id number] PCN:[id number]     They did not run primary info because they do not have it on file.

## 2022-03-07 ENCOUNTER — OFFICE VISIT (OUTPATIENT)
Dept: ORTHOPEDIC SURGERY | Age: 62
End: 2022-03-07
Payer: COMMERCIAL

## 2022-03-07 VITALS — HEIGHT: 76 IN | WEIGHT: 255 LBS | BODY MASS INDEX: 31.05 KG/M2

## 2022-03-07 DIAGNOSIS — S46.011D TRAUMATIC COMPLETE TEAR OF RIGHT ROTATOR CUFF, SUBSEQUENT ENCOUNTER: Primary | ICD-10-CM

## 2022-03-07 PROCEDURE — 99212 OFFICE O/P EST SF 10 MIN: CPT | Performed by: ORTHOPAEDIC SURGERY

## 2022-03-07 NOTE — LETTER
[facility]  [place] [address] [address] [address]  Phone: [phone]  Fax: [phone]    Dilcia Farah MD        March 7, 2022     Patient: Cathy Colbert Sr. 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 · No lifting, pushing, pulling or carrying. · No overhead activity.   · Restrictions in place until mid-May  Sincerely,      Dilcia Farah MD

## 2022-03-07 NOTE — LETTER
[facility]  [facility] [address] [address]  Phone: [phone]  Fax: [phone]    Donald Montez MD        March 7, 2022     Patient: Tito Medrano Sr. YOB: 1960   Date of Visit: 3/7/2022       To Whom It May Concern:    · It is my medical opinion that Tito Medrano may return to light duty immediately with the following restrictions:   · He may drive a car at this time. · No lifting, pushing, pulling or carrying. · No overhead activity. Restrictions in place until approximately 05/15/2022    If you have any questions or concerns, please don't hesitate to call.     Sincerely,        Donald Montez MD

## 2022-03-07 NOTE — PROGRESS NOTES
History:  Ada Barney Sr. is here for follow up after right shoulder arthroscopic surgery. Surgery date was 11/6/20. Findings at surgery: large rotator cuff tear, type 2 SLAP tear. The patient's pain is rated at 5/10. He is a . He states that he has a hearing WC on 4/29/2022. He needs his work restrictions updated. Physical Examination:  Ht 6' 4\" (1.93 m)   Wt 255 lb (115.7 kg)   BMI 31.04 kg/m²        Patient is awake, alert, and in no acute distress. MRI right shoulder from [facility] 7/14/21: Moderate-sized full-thickness partial tear of the distal supraspinatus tendon. There is no tendon retraction. Tendinosis changes and intrasubstance fissuring at the myotendinous junction. Assessment:   1. Right shoulder rotator cuff tear  2.  16 months status post right shoulder arthroscopy, subacromial decompression, rotator cuff repair, and biceps tenodesis      Plan:   Again recommend right shoulder arthroscopy, revision rotator cuff repair with bioinductive implant augmentation. We will keep him on same work restrictions. Updated work note provided. Awaiting surgery approval from [facility]. The patient will see their PCP for H&P and clearance for surgery.

## 2022-03-13 ENCOUNTER — APPOINTMENT (OUTPATIENT)
Dept: CT IMAGING | Age: 62
End: 2022-03-13

## 2022-03-13 ENCOUNTER — HOSPITAL ENCOUNTER (EMERGENCY)
Age: 62
Discharge: HOME | End: 2022-03-13
Attending: EMERGENCY MEDICINE

## 2022-03-13 VITALS
RESPIRATION RATE: 18 BRPM | BODY MASS INDEX: 30.51 KG/M2 | SYSTOLIC BLOOD PRESSURE: 158 MMHG | OXYGEN SATURATION: 98 % | TEMPERATURE: 97.1 F | HEIGHT: 77 IN | WEIGHT: 258.38 LBS | HEART RATE: 67 BPM | DIASTOLIC BLOOD PRESSURE: 94 MMHG

## 2022-03-13 DIAGNOSIS — R10.9 ABDOMINAL PAIN, UNSPECIFIED ABDOMINAL LOCATION: Primary | ICD-10-CM

## 2022-03-13 LAB
A/G RATIO: 1.4 (ref 1.1–2.2)
ALBUMIN SERPL-MCNC: 4.3 G/DL (ref 3.4–5)
ALP BLD-CCNC: 87 U/L (ref 40–129)
ALT SERPL-CCNC: 78 U/L (ref 10–40)
ANION GAP SERPL CALCULATED.3IONS-SCNC: 17 MMOL/L (ref 3–16)
AST SERPL-CCNC: 95 U/L (ref 15–37)
BILIRUB SERPL-MCNC: 0.7 MG/DL (ref 0–1)
BILIRUBIN URINE: NEGATIVE
BLOOD, URINE: NEGATIVE
BUN BLDV-MCNC: 7 MG/DL (ref 7–20)
CALCIUM SERPL-MCNC: 9.2 MG/DL (ref 8.3–10.6)
CHLORIDE BLD-SCNC: 98 MMOL/L (ref 99–110)
CLARITY: CLEAR
CO2: 20 MMOL/L (ref 21–32)
COLOR: YELLOW
CREAT SERPL-MCNC: 0.7 MG/DL (ref 0.8–1.3)
GFR AFRICAN AMERICAN: >60
GFR NON-AFRICAN AMERICAN: >60
GLUCOSE BLD-MCNC: 102 MG/DL (ref 70–99)
GLUCOSE URINE: NEGATIVE MG/DL
KETONES, URINE: NEGATIVE MG/DL
LACTIC ACID: 1.9 MMOL/L (ref 0.4–2)
LEUKOCYTE ESTERASE, URINE: NEGATIVE
LIPASE: 20 U/L (ref 13–60)
MICROSCOPIC EXAMINATION: NORMAL
NITRITE, URINE: NEGATIVE
PH UA: 5.5 (ref 5–8)
POTASSIUM SERPL-SCNC: 3.3 MMOL/L (ref 3.5–5.1)
PROTEIN UA: NEGATIVE MG/DL
REASON FOR REJECTION: NORMAL
REASON FOR REJECTION: NORMAL
REJECTED TEST: NORMAL
REJECTED TEST: NORMAL
SODIUM BLD-SCNC: 135 MMOL/L (ref 136–145)
SPECIFIC GRAVITY UA: 1.01 (ref 1–1.03)
TOTAL PROTEIN: 7.3 G/DL (ref 6.4–8.2)
URINE REFLEX TO CULTURE: NORMAL
URINE TYPE: NORMAL
UROBILINOGEN, URINE: 0.2 E.U./DL

## 2022-03-13 PROCEDURE — 2580000003 HC RX 258: Performed by: EMERGENCY MEDICINE

## 2022-03-13 PROCEDURE — 6370000000 HC RX 637 (ALT 250 FOR IP): Performed by: EMERGENCY MEDICINE

## 2022-03-13 PROCEDURE — 83605 ASSAY OF LACTIC ACID: CPT

## 2022-03-13 PROCEDURE — 80053 COMPREHEN METABOLIC PANEL: CPT

## 2022-03-13 PROCEDURE — 81003 URINALYSIS AUTO W/O SCOPE: CPT

## 2022-03-13 PROCEDURE — 36415 COLL VENOUS BLD VENIPUNCTURE: CPT

## 2022-03-13 PROCEDURE — 74177 CT ABD & PELVIS W/CONTRAST: CPT

## 2022-03-13 PROCEDURE — 83690 ASSAY OF LIPASE: CPT

## 2022-03-13 PROCEDURE — 99284 EMERGENCY DEPT VISIT MOD MDM: CPT

## 2022-03-13 PROCEDURE — 6360000004 HC RX CONTRAST MEDICATION: Performed by: EMERGENCY MEDICINE

## 2022-03-13 RX ORDER — 0.9 % SODIUM CHLORIDE 0.9 %
1000 INTRAVENOUS SOLUTION INTRAVENOUS ONCE
Status: COMPLETED | OUTPATIENT
Start: 2022-03-13 | End: 2022-03-13

## 2022-03-13 RX ORDER — POTASSIUM CHLORIDE 750 MG/1
20 TABLET, FILM COATED, EXTENDED RELEASE ORAL ONCE
Status: COMPLETED | OUTPATIENT
Start: 2022-03-13 | End: 2022-03-13

## 2022-03-13 RX ADMIN — SODIUM CHLORIDE 1000 ML: 9 INJECTION, SOLUTION INTRAVENOUS at 05:29

## 2022-03-13 RX ADMIN — POTASSIUM CHLORIDE 20 MEQ: 750 TABLET, EXTENDED RELEASE ORAL at 05:06

## 2022-03-13 RX ADMIN — IOPAMIDOL 75 ML: 755 INJECTION, SOLUTION INTRAVENOUS at 04:15

## 2022-03-13 RX ADMIN — IOHEXOL 50 ML: 240 INJECTION, SOLUTION INTRATHECAL; INTRAVASCULAR; INTRAVENOUS; ORAL at 04:15

## 2022-03-13 ASSESSMENT — PAIN DESCRIPTION - LOCATION: LOCATION: ABDOMEN

## 2022-03-13 ASSESSMENT — PAIN - FUNCTIONAL ASSESSMENT: PAIN_FUNCTIONAL_ASSESSMENT: 0-10

## 2022-03-13 ASSESSMENT — PAIN DESCRIPTION - DESCRIPTORS: DESCRIPTORS: CRAMPING

## 2022-03-13 ASSESSMENT — PAIN DESCRIPTION - ORIENTATION: ORIENTATION: RIGHT

## 2022-03-13 ASSESSMENT — PAIN SCALES - GENERAL: PAINLEVEL_OUTOF10: 8

## 2022-03-13 ASSESSMENT — PAIN DESCRIPTION - PAIN TYPE: TYPE: ACUTE PAIN

## 2022-03-13 NOTE — ED NOTES
Discharge and education instructions reviewed. Patient verbalized understanding, teach-back successful. Patient denied questions at this time. No acute distress noted. Patient instructed to follow-up as noted - return to emergency department if symptoms worsen. Patient verbalized understanding. Discharged per EDMD with discharge instructions.         Elisha Caldwell RN  03/13/22 [id number]

## 2022-03-13 NOTE — ED PROVIDER NOTES
Triage Chief Complaint:   Constipation (last bm today; small and rock like; no relief with otc)    [place]:  Cristine Love Sr. is a 63 y.o. male that presents evaluation of abdominal pain, decreased bowel movement frequency and size. He took MiraLAX which helped moderately. no nausea vomiting fever no chills no headache no chest pain reported    ROS:  At least 12 systems reviewed and otherwise acutely negative except as in the [address] Past Medical History:   Diagnosis Date    Anxiety     Gout     Hyperlipidemia     Hypertension     Knee pain      Past Surgical History:   Procedure Laterality Date    FINGER FRACTURE SURGERY Left     2 MIDDLE FINGERS    KNEE ARTHROSCOPY  right knee    SHOULDER ARTHROSCOPY Right 11/6/2020    RIGHT SHOULDER ARTHROSCOPY, SUBACROMIAL DECOMPRESSION, ROTATOR CUFF REPAIR AND BICEPS TENDONISIS performed by Evan Franco MD at [facility] History   Problem Relation Age of Onset    Cancer Mother     Diabetes Mother     Heart Failure Mother     Diabetes Father     Heart Disease Father      Social History     Socioeconomic History    Marital status:      Spouse name: Not on file    Number of children: Not on file    Years of education: Not on file    Highest education level: Not on file   Occupational History    Not on file   Tobacco Use    Smoking status: Current Some Day Smoker     Packs/day: 0.25     Years: 8.00     Pack years: 2.00     Types: Cigarettes    Smokeless tobacco: Never Used   Vaping Use    Vaping Use: Never used   Substance and Sexual Activity    Alcohol use:  Yes     Alcohol/week: 0.0 standard drinks     Comment: socially    Drug use: Never    Sexual activity: Not Currently   Other Topics Concern    Not on file   Social History Narrative    Not on file     Social Determinants of Health     Financial Resource Strain: Unknown    Difficulty of Paying Living Expenses: Patient refused   Food Insecurity: Unknown    Worried About Running Out of Food in the Last Year: Patient refused   [address] in the Last Year: Patient refused   Transportation Needs: Unknown    Lack of Transportation (Medical): Patient refused    Lack of Transportation (Non-Medical): Patient refused   Physical Activity: Unknown    Days of Exercise per Week: Patient refused    Minutes of Exercise per Session: Patient refused   Stress: Unknown    Feeling of Stress : Patient refused   Social Connections: Unknown    Frequency of Communication with Friends and Family: Patient refused    Frequency of Social Gatherings with Friends and Family: Patient refused    Attends Buddhism Services: Patient refused    Active Member of Clubs or Organizations: Not on file    Attends Club or Organization Meetings: Patient refused    Marital Status: Patient refused   Intimate Partner Violence: Unknown    Fear of Current or Ex-Partner: Patient refused    Emotionally Abused: Patient refused    Physically Abused: Patient refused    Sexually Abused: Patient refused   Housing Stability: Unknown    Unable to Pay for Housing in the Last Year: Patient refused    [facility]: Not on file    Unstable Housing in the Last Year: Patient refused     Current Facility-Administered Medications   Medication Dose Route Frequency Provider Last Rate Last Admin    potassium chloride (KLOR-CON) extended release tablet 20 mEq  20 mEq Oral Once Guera Cutler MD         Current Outpatient Medications   Medication Sig Dispense Refill    oxyCODONE-acetaminophen (PERCOCET) 5-325 MG per tablet Take 1 tablet by mouth every 6 hours as needed for Pain (max 1-2 per day) for up to 35 days.  60 tablet 0    celecoxib (CELEBREX) 200 MG capsule Take 1 capsule by mouth daily 30 capsule 0    allopurinol (ZYLOPRIM) 300 MG tablet Take 1 tablet by mouth daily 90 tablet 1    tadalafil (CIALIS) 5 MG tablet Take 1 tablet by mouth daily 90 tablet 1    bisoprolol-hydroCHLOROthiazide [facility]) 10-6.25 MG per tablet TAKE ONE TABLET BY MOUTH TWICE A  tablet 1    atorvastatin (LIPITOR) 80 MG tablet TAKE 1 TABLET BY MOUTH ONE TIME A DAY 90 tablet 1    colchicine (COLCRYS) 0.6 MG tablet Take 1 tablet by mouth once daily 90 tablet 0    promethazine (PHENERGAN) 25 MG tablet Take 1 tablet by mouth every 6 hours as needed for Nausea 5 tablet 0    loratadine (CLARITIN) 10 MG tablet Take 1 tablet by mouth daily 30 tablet 0     No Known Allergies    [unfilled]    Nursing Notes Reviewed    Physical Exam:  Vitals:    03/13/22 0044   BP: (!) 158/94   Pulse: 67   Resp: 18   Temp: 97.1 °F (36.2 °C)   SpO2: 98%       GENERAL APPEARANCE: Awake and alert. Cooperative. No acute distress. HEAD: Normocephalic. Atraumatic. EYES: EOM's grossly intact. Sclera anicteric. ENT: Mucous membranes are moist. Tolerates saliva. No trismus. NECK: Supple. No meningismus. Trachea midline. HEART: RRR. Radial pulses 2+. LUNGS: Respirations unlabored. CTAB  ABDOMEN: Soft. Diffusely TTP. No guarding or rebound. EXTREMITIES: No acute deformities. SKIN: Warm and dry. NEUROLOGICAL: No gross facial drooping. Moves all 4 extremities spontaneously. PSYCHIATRIC: Normal mood.     I have reviewed and interpreted all of the currently available lab results from this visit (if applicable):  Results for orders placed or performed during the hospital encounter of 03/13/22   Comprehensive Metabolic Panel   Result Value Ref Range    Sodium 135 (L) 136 - 145 mmol/L    Potassium 3.3 (L) 3.5 - 5.1 mmol/L    Chloride 98 (L) 99 - 110 mmol/L    CO2 20 (L) 21 - 32 mmol/L    Anion Gap 17 (H) 3 - 16    Glucose 102 (H) 70 - 99 mg/dL    BUN 7 7 - 20 mg/dL    CREATININE 0.7 (L) 0.8 - 1.3 mg/dL    GFR Non-African American >60 >60    GFR African American >60 >60    Calcium 9.2 8.3 - 10.6 mg/dL    Total Protein 7.3 6.4 - 8.2 g/dL    Albumin 4.3 3.4 - 5.0 g/dL    Albumin/Globulin Ratio 1.4 1.1 - 2.2    Total Bilirubin 0.7 0.0 - 1.0 mg/dL    Alkaline Phosphatase 87 40 - 129 U/L    ALT 78 (H) 10 - 40 U/L    AST 95 (H) 15 - 37 U/L   Lipase   Result Value Ref Range    Lipase 20.0 13.0 - 60.0 U/L   Lactic Acid   Result Value Ref Range    Lactic Acid 1.9 0.4 - 2.0 mmol/L   Urinalysis with Reflex to Culture    Specimen: Urine   Result Value Ref Range    Color, UA YELLOW Straw/Yellow    Clarity, UA Clear Clear    Glucose, Ur Negative Negative mg/dL    Bilirubin Urine Negative Negative    Ketones, Urine Negative Negative mg/dL    Specific Gravity, UA 1.008 1.005 - 1.030    Blood, Urine Negative Negative    pH, UA 5.5 5.0 - 8.0    Protein, UA Negative Negative mg/dL    Urobilinogen, Urine 0.2 <2.0 E.U./dL    Nitrite, Urine Negative Negative    Leukocyte Esterase, Urine Negative Negative    Microscopic Examination Not Indicated     Urine Type NotGiven     Urine Reflex to Culture Not Indicated    SPECIMEN REJECTION   Result Value Ref Range    Rejected Test cbcwd     Reason for Rejection see below         Radiographs (if obtained):  [] The following radiograph was interpreted by myself in the absence of a radiologist:  [x] Radiologist's Report Reviewed:       CT ABDOMEN PELVIS W IV CONTRAST Additional Contrast? Oral (Final result)  Result time 03/13/22 04:43:20  Final result by Traci Dhaliwal MD (03/13/22 04:43:20)                Impression:    No CT evidence of acute intra-abdominal pathology. Narrative:    EXAMINATION:   CT OF THE ABDOMEN AND PELVIS WITH CONTRAST 3/13/2022 3:59 am     TECHNIQUE:   CT of the abdomen and pelvis was performed with the administration of   intravenous contrast. Multiplanar reformatted images are provided for review. Dose modulation, iterative reconstruction, and/or weight based adjustment of   the mA/kV was utilized to reduce the radiation dose to as low as reasonably   achievable. COMPARISON:   None.      HISTORY:   ORDERING SYSTEM PROVIDED HISTORY: JUANA TAYLOR ALSO. cayetano Juarez   TECHNOLOGIST PROVIDED HISTORY:   Reason for exam:->PO CONRAST ALSO.  abdpain, dec BM   Additional Contrast?->Oral   Decision Support Exception - unselect if not a suspected or confirmed   emergency medical condition->Emergency Medical Condition (MA)   Reason for Exam: PO CONRAST ALSO.  abdpain, dec BM     FINDINGS:   CARDIOVASCULAR: The visualized heart and pericardium demonstrate no acute   abnormality. The aorta and branch vessels are patent and normal in caliber. LUNG BASES: There are no focal consolidations or pleural effusions. HEPATOBILIARY: The liver is normal in size. There are no focal hepatic   lesions. There is no biliary ductal dilatation. The gallbladder is   unremarkable. SPLEEN: Unremarkable. PANCREAS: Unremarkable     ADRENAL GLANDS: Unremarkable. KIDNEYS: Kidneys are normal in size and contour and demonstrate symmetric   enhancement.  There is no hydronephrosis or nephrolithiasis. ABDOMINAL NODES: No adenopathy is appreciated. PELVIC ORGANS: The urinary bladder is unremarkable.  The prostate is normal   in size. PERITONEUM/MESENTERY/BOWEL: The stomach is unremarkable. Lenetta Cave is no bowel   obstruction. There is no bowel wall thickening. The appendix is normal.     BONES/SOFT TISSUES: There is no acute osseous or soft tissue abnormality. EKG (if obtained): (All EKG's are interpreted by myself in the absence of a cardiologist)  Initial EKG on my interpretation shows n/a    MDM:  Differential diagnosis: Small bowel obstruction, appendicitis, bowel perforation    CMP notable for sodium of 135, CO2 17 anion gap of 17. This was addressed with IV fluid. Potassium of 3.3 repleted also. CBC,  Lipase lactic acid as well as urinalysis unremarkable. CT abdomen pelvis with p.o. and IV contrast shows no acute abnormality. Will discharge with outpatient GI follow-up and recommended supportive care and OTC measures continue along with supplementing p.o. duration and potassium.   I estimate there is LOW risk for ACUTE APPENDICITIS, BOWEL OBSTRUCTION, CHOLECYSTITIS, DIVERTICULITIS, INCARCERATED HERNIA, PANCREATITIS, PERFORATED BOWEL, BOWEL ISCHEMIA, GONADAL TORSION, OR CARDIAC ISCHEMIA, thus I consider the discharge disposition reasonable. Also, there is no evidence or peritonitis, sepsis, or toxicity. Discussed results, diagnosis and plan with patient and/or family. Questions addressed. Disposition and follow-up agreed upon. Specific discharge instructions explained. The patient and/or family and I have discussed the diagnosis and risks, and we agree with discharging home to follow-up with their primary care, specialist or referral doctor. In the event that medications were prescribed the risk profile of these medications were detailed expressly. We also discussed returning to the Emergency Department immediately if new or worsening symptoms occur. We have discussed the symptoms which are most concerning that necessitate immediate return. Old records reviewed. Labs and imaging reviewed and results discussed with patient. .        Patient was given scripts for the following medications. I counseled patient how to take these medications. New Prescriptions    No medications on file         CRITICAL CARE TIME   Total Critical Care time was 0 minutes, excluding separately reportable procedures. There was a high probability of clinically significant/life threatening deterioration in the patient's condition which required my urgent intervention.       Clinical Impression:  Abdominal pain, abnormal bowel patterns, hypokalemia  (Please note that portions of this note may have been completed with a voice recognition program. Efforts were made to edit the dictations but occasionally words are mis-transcribed.)    Anitra Keller MD         [facility] [place], [place]  03/13/22 7967

## 2022-03-30 ENCOUNTER — OFFICE VISIT (OUTPATIENT)
Dept: PAIN MANAGEMENT | Age: 62
End: 2022-03-30
Payer: COMMERCIAL

## 2022-03-30 VITALS
TEMPERATURE: 97 F | WEIGHT: 260 LBS | OXYGEN SATURATION: 98 % | BODY MASS INDEX: 30.7 KG/M2 | DIASTOLIC BLOOD PRESSURE: 84 MMHG | HEIGHT: 77 IN | SYSTOLIC BLOOD PRESSURE: 125 MMHG | HEART RATE: 79 BPM

## 2022-03-30 DIAGNOSIS — G89.4 CHRONIC PAIN SYNDROME: ICD-10-CM

## 2022-03-30 DIAGNOSIS — M51.26 HNP (HERNIATED NUCLEUS PULPOSUS), LUMBAR: ICD-10-CM

## 2022-03-30 DIAGNOSIS — M54.16 LUMBAR RADICULOPATHY: ICD-10-CM

## 2022-03-30 DIAGNOSIS — S46.011S TRAUMATIC COMPLETE TEAR OF RIGHT ROTATOR CUFF, SEQUELA: ICD-10-CM

## 2022-03-30 DIAGNOSIS — M51.36 DDD (DEGENERATIVE DISC DISEASE), LUMBAR: ICD-10-CM

## 2022-03-30 DIAGNOSIS — S46.011D TRAUMATIC COMPLETE TEAR OF RIGHT ROTATOR CUFF, SUBSEQUENT ENCOUNTER: ICD-10-CM

## 2022-03-30 PROCEDURE — 99213 OFFICE O/P EST LOW 20 MIN: CPT | Performed by: INTERNAL MEDICINE

## 2022-03-30 RX ORDER — OXYCODONE HYDROCHLORIDE AND ACETAMINOPHEN 5; 325 MG/1; MG/1
1 TABLET ORAL EVERY 6 HOURS PRN
Qty: 60 TABLET | Refills: 0 | Status: SHIPPED | OUTPATIENT
Start: 2022-03-30 | End: 2022-05-04 | Stop reason: SDUPTHER

## 2022-03-30 RX ORDER — CELECOXIB 200 MG/1
200 CAPSULE ORAL DAILY
Qty: 30 CAPSULE | Refills: 0 | Status: SHIPPED | OUTPATIENT
Start: 2022-03-30 | End: 2022-05-04 | Stop reason: SDUPTHER

## 2022-03-30 NOTE — PROGRESS NOTES
Kvng Graham .  1960  [id number]      HISTORY OF PRESENT ILLNESS:  Mr. Julio Cesar Serna is a 63 y.o. male returns for a follow up visit for pain management  He has a diagnosis of   1. Chronic pain syndrome    2. HNP (herniated nucleus pulposus), lumbar    3. Traumatic complete tear of right rotator cuff, sequela    4. Lumbar radiculopathy    5. DDD (degenerative disc disease), lumbar    6. Traumatic complete tear of right rotator cuff, subsequent encounter    . He complains of pain in the Low back, buttock  He rates the pain 5/10 and describes it as aching, numbness, pins and needles. Current treatment regimen has helped relieve about 50% of the pain. He denies any side effects from the current pain regimen. Patient reports that since the last follow up visit the physical functioning is unchanged, family/social relationships are unchanged, mood is unchanged sleep patterns are unchanged, and that the overall functioning is unchanged. Patient denies misusing/abusing his narcotic pain medications or using any illegal drugs. There are No indicators for possible drug abuse, addiction or diversion problems. Patient states the pain has been baseline and tolerable somewhat. He says he has been using Percocet along with Celebrex. He mentions he is managing with the medications. He says he wants to return to work, but hurts too much in shoulder and back. ALLERGIES: Patients list of allergies were reviewed     MEDICATIONS: Mr. Julio Cesar Serna list of medications were reviewed. His current medications are   Outpatient Medications Prior to Visit   Medication Sig Dispense Refill    allopurinol (ZYLOPRIM) 300 MG tablet Take 1 tablet by mouth daily 90 tablet 1    tadalafil (CIALIS) 5 MG tablet Take 1 tablet by mouth daily 90 tablet 1    bisoprolol-hydroCHLOROthiazide (ZIAC) 10-6.25 MG per tablet TAKE ONE TABLET BY MOUTH TWICE A  tablet 1    atorvastatin (LIPITOR) 80 MG tablet TAKE 1 TABLET BY MOUTH ONE TIME A DAY 90 tablet 1    colchicine (COLCRYS) 0.6 MG tablet Take 1 tablet by mouth once daily 90 tablet 0    promethazine (PHENERGAN) 25 MG tablet Take 1 tablet by mouth every 6 hours as needed for Nausea 5 tablet 0    loratadine (CLARITIN) 10 MG tablet Take 1 tablet by mouth daily 30 tablet 0    oxyCODONE-acetaminophen (PERCOCET) 5-325 MG per tablet Take 1 tablet by mouth every 6 hours as needed for Pain (max 1-2 per day) for up to 35 days. 60 tablet 0    celecoxib (CELEBREX) 200 MG capsule Take 1 capsule by mouth daily 30 capsule 0     No facility-administered medications prior to visit. REVIEW OF SYSTEMS:    Respiratory: Negative for apnea, chest tightness and shortness of breath or change in baseline breathing. PHYSICAL EXAM:   Nursing note and vitals reviewed. /84   Pulse 79   Temp 97 °F (36.1 °C)   Ht 6' 5\" (1.956 m)   Wt 260 lb (117.9 kg)   SpO2 98%   BMI 30.83 kg/m²   Constitutional: He appears well-developed and well-nourished. No acute distress. Cardiovascular: Normal rate, regular rhythm, normal heart sounds, and does not have murmur. Pulmonary/Chest: Effort normal. No respiratory distress. He does not have wheezes in the lung fields. He has no rales. Neurological/Psychiatric:He is alert and oriented to person, place, and time. Coordination is  normal.  His mood isAppropriate and affect is Neutral/Euthymic(normal) . IMPRESSION:   1. Chronic pain syndrome    2. HNP (herniated nucleus pulposus), lumbar    3. Traumatic complete tear of right rotator cuff, sequela    4. Lumbar radiculopathy    5. DDD (degenerative disc disease), lumbar    6. Traumatic complete tear of right rotator cuff, subsequent encounter        PLAN:  Informed verbal consent was obtained  -[place] was obtained and reviewed  for the last one year and no indicators of drug misuse  were found.  Any other controlled substance prescriptions  seen on the record have been accounted for, I am aware of the patient receiving these medications. Kike Oconnell [place] record will be rechecked as part of office protocol. -ROM/Stretching exercises as advised   -continue with Percocet 1-2 per day   -He was advised to increase fluids ( 5-7  glasses of fluid daily), limit caffeine, avoid cheese products, increase dietary fiber, increase activity and exercise as tolerated and relax regularly and enjoy meals   -Walking as tolerated    Current Outpatient Medications   Medication Sig Dispense Refill    oxyCODONE-acetaminophen (PERCOCET) 5-325 MG per tablet Take 1 tablet by mouth every 6 hours as needed for Pain (max 1-2 per day) for up to 35 days. 60 tablet 0    celecoxib (CELEBREX) 200 MG capsule Take 1 capsule by mouth daily 30 capsule 0    allopurinol (ZYLOPRIM) 300 MG tablet Take 1 tablet by mouth daily 90 tablet 1    tadalafil (CIALIS) 5 MG tablet Take 1 tablet by mouth daily 90 tablet 1    bisoprolol-hydroCHLOROthiazide (ZIAC) 10-6.25 MG per tablet TAKE ONE TABLET BY MOUTH TWICE A  tablet 1    atorvastatin (LIPITOR) 80 MG tablet TAKE 1 TABLET BY MOUTH ONE TIME A DAY 90 tablet 1    colchicine (COLCRYS) 0.6 MG tablet Take 1 tablet by mouth once daily 90 tablet 0    promethazine (PHENERGAN) 25 MG tablet Take 1 tablet by mouth every 6 hours as needed for Nausea 5 tablet 0    loratadine (CLARITIN) 10 MG tablet Take 1 tablet by mouth daily 30 tablet 0     No current facility-administered medications for this visit. I will continue his current medication regimen  which is part of the above treatment schedule. It has been helping with Mr. Jesus's chronic  medical problems which for this visit include:   Diagnoses of Chronic pain syndrome, HNP (herniated nucleus pulposus), lumbar, Traumatic complete tear of right rotator cuff, sequela, Lumbar radiculopathy, DDD (degenerative disc disease), lumbar, and Traumatic complete tear of right rotator cuff, subsequent encounter were pertinent to this visit.    Risks and benefits of the medications and other alternative treatments  including no treatment were discussed with the patient. The common side effects of these medications were also explained to the patient. Informed verbal consent was obtained. Goals of current treatment regimen include improvement in pain, restoration of functioning- with focus on improvement in physical performance, general activity, work or disability,emotional distress, health care utilization and  decreased medication consumption. Will continue to monitor progress towards achieving/maintaining therapeutic goals with special emphasis on  1. Improvement in perceived interfernce  of pain with ADL's. Ability to do home exercises independently. Ability to do household chores indoor and/or outdoor work and social and leisure activities. Improve psychosocial and physical functioning. - he is showing progression towards this treatment goal with the current regimen. He was advised against drinking alcohol with the narcotic pain medicines, advised against driving or handling machinery while adjusting the dose of medicines or if having cognitive  issues related to the current medications. Risk of overdose and death, if medicines not taken as prescribed, were also discussed. If the patient develops new symptoms or if the symptoms worsen, the patient should call the office. While transcribing every attempt was made to maintain the accuracy of the note in terms of it's contents,there may have been some errors made inadvertently. Thank you for allowing me to participate in the care of this patient.     Naomi Chacon MD.    Cc: Flakita Maria MD

## 2022-05-04 ENCOUNTER — OFFICE VISIT (OUTPATIENT)
Dept: PAIN MANAGEMENT | Age: 62
End: 2022-05-04
Payer: COMMERCIAL

## 2022-05-04 VITALS
HEART RATE: 61 BPM | BODY MASS INDEX: 31.02 KG/M2 | WEIGHT: 261.6 LBS | OXYGEN SATURATION: 100 % | SYSTOLIC BLOOD PRESSURE: 128 MMHG | DIASTOLIC BLOOD PRESSURE: 83 MMHG

## 2022-05-04 DIAGNOSIS — M51.26 HNP (HERNIATED NUCLEUS PULPOSUS), LUMBAR: ICD-10-CM

## 2022-05-04 DIAGNOSIS — M54.16 LUMBAR RADICULOPATHY: ICD-10-CM

## 2022-05-04 DIAGNOSIS — S46.011D TRAUMATIC COMPLETE TEAR OF RIGHT ROTATOR CUFF, SUBSEQUENT ENCOUNTER: ICD-10-CM

## 2022-05-04 DIAGNOSIS — M51.36 DDD (DEGENERATIVE DISC DISEASE), LUMBAR: ICD-10-CM

## 2022-05-04 DIAGNOSIS — G89.4 CHRONIC PAIN SYNDROME: ICD-10-CM

## 2022-05-04 DIAGNOSIS — S46.011S TRAUMATIC COMPLETE TEAR OF RIGHT ROTATOR CUFF, SEQUELA: ICD-10-CM

## 2022-05-04 PROCEDURE — 99213 OFFICE O/P EST LOW 20 MIN: CPT | Performed by: INTERNAL MEDICINE

## 2022-05-04 RX ORDER — OXYCODONE HYDROCHLORIDE AND ACETAMINOPHEN 5; 325 MG/1; MG/1
1 TABLET ORAL EVERY 6 HOURS PRN
Qty: 60 TABLET | Refills: 0 | Status: SHIPPED | OUTPATIENT
Start: 2022-05-04 | End: 2022-05-23

## 2022-05-04 RX ORDER — CELECOXIB 200 MG/1
200 CAPSULE ORAL DAILY
Qty: 30 CAPSULE | Refills: 1 | Status: SHIPPED | OUTPATIENT
Start: 2022-05-04 | End: 2022-07-25

## 2022-05-04 NOTE — PROGRESS NOTES
[facility]  1960  [id number]      HISTORY OF PRESENT ILLNESS:  Mr. Ismael Monatno is a 63 y.o. male returns for a follow up visit for pain management  He has a diagnosis of   1. Chronic pain syndrome    2. Traumatic complete tear of right rotator cuff, sequela    3. DDD (degenerative disc disease), lumbar    4. HNP (herniated nucleus pulposus), lumbar    5. Lumbar radiculopathy    6. Traumatic complete tear of right rotator cuff, subsequent encounter    . He complains of pain in the lower back with radiation to the bilteral upper legs  He rates the pain 6/10 and describes it as sharp, aching, numbness. Current treatment regimen has helped relieve about 50% of the pain. He denies any side effects from the current pain regimen. Patient reports that since the last follow up visit the physical functioning is unchanged, family/social relationships are unchanged, mood is unchanged sleep patterns are unchanged, and that the overall functioning is unchanged. Patient denies misusing/abusing his narcotic pain medications or using any illegal drugs. There are No indicators for possible drug abuse, addiction or diversion problems, patient states he has been doing fair. Mr. Ismael Montano states his pain has been worse in the knees, back and legs, he states it switches between left to right. He mentions he is using Celebrex along with Percocet which is helping some. Patient denies any constipation symptoms. ALLERGIES: Patients list of allergies were reviewed     MEDICATIONS: Mr. Ismael Montano list of medications were reviewed. His current medications are   Outpatient Medications Prior to Visit   Medication Sig Dispense Refill    oxyCODONE-acetaminophen (PERCOCET) 5-325 MG per tablet Take 1 tablet by mouth every 6 hours as needed for Pain (max 1-2 per day) for up to 35 days.  60 tablet 0    celecoxib (CELEBREX) 200 MG capsule Take 1 capsule by mouth daily 30 capsule 0    allopurinol (ZYLOPRIM) 300 MG tablet Take 1 tablet by mouth daily 90 tablet 1    tadalafil (CIALIS) 5 MG tablet Take 1 tablet by mouth daily 90 tablet 1    bisoprolol-hydroCHLOROthiazide (ZIAC) 10-6.25 MG per tablet TAKE ONE TABLET BY MOUTH TWICE A  tablet 1    atorvastatin (LIPITOR) 80 MG tablet TAKE 1 TABLET BY MOUTH ONE TIME A DAY 90 tablet 1    colchicine (COLCRYS) 0.6 MG tablet Take 1 tablet by mouth once daily 90 tablet 0    promethazine (PHENERGAN) 25 MG tablet Take 1 tablet by mouth every 6 hours as needed for Nausea 5 tablet 0    loratadine (CLARITIN) 10 MG tablet Take 1 tablet by mouth daily 30 tablet 0     No facility-administered medications prior to visit. REVIEW OF SYSTEMS:    Respiratory: Negative for apnea, chest tightness and shortness of breath or change in baseline breathing. PHYSICAL EXAM:   Nursing note and vitals reviewed. /83   Pulse 61   Wt 261 lb 9.6 oz (118.7 kg)   SpO2 100%   BMI 31.02 kg/m²   Constitutional: He appears well-developed and well-nourished. No acute distress. Cardiovascular: Normal rate, regular rhythm, normal heart sounds, and does not have murmur. Pulmonary/Chest: Effort normal. No respiratory distress. He does not have wheezes in the lung fields. He has no rales. Neurological/Psychiatric:He is alert and oriented to person, place, and time. Coordination is  normal.  His mood isAppropriate and affect is Neutral/Euthymic(normal) . His    IMPRESSION:   1. Chronic pain syndrome    2. Traumatic complete tear of right rotator cuff, sequela    3. DDD (degenerative disc disease), lumbar    4. HNP (herniated nucleus pulposus), lumbar    5. Lumbar radiculopathy    6. Traumatic complete tear of right rotator cuff, subsequent encounter        PLAN:  Informed verbal consent was obtained  -[place] was obtained and reviewed  for the last one year and no indicators of drug misuse  were found.  Any other controlled substance prescriptions  seen on the record have been accounted for, I am aware of the patient receiving these medications. Nael Cesar [place] record will be rechecked as part of office protocol. -ROM/Stretching exercises as advised   -Continue with Percocet 1-2 per day along with Celebrex   -Knee and back exercises given      Current Outpatient Medications   Medication Sig Dispense Refill    oxyCODONE-acetaminophen (PERCOCET) 5-325 MG per tablet Take 1 tablet by mouth every 6 hours as needed for Pain (max 1-2 per day) for up to 35 days. 60 tablet 0    celecoxib (CELEBREX) 200 MG capsule Take 1 capsule by mouth daily 30 capsule 0    allopurinol (ZYLOPRIM) 300 MG tablet Take 1 tablet by mouth daily 90 tablet 1    tadalafil (CIALIS) 5 MG tablet Take 1 tablet by mouth daily 90 tablet 1    bisoprolol-hydroCHLOROthiazide (ZIAC) 10-6.25 MG per tablet TAKE ONE TABLET BY MOUTH TWICE A  tablet 1    atorvastatin (LIPITOR) 80 MG tablet TAKE 1 TABLET BY MOUTH ONE TIME A DAY 90 tablet 1    colchicine (COLCRYS) 0.6 MG tablet Take 1 tablet by mouth once daily 90 tablet 0    promethazine (PHENERGAN) 25 MG tablet Take 1 tablet by mouth every 6 hours as needed for Nausea 5 tablet 0    loratadine (CLARITIN) 10 MG tablet Take 1 tablet by mouth daily 30 tablet 0     No current facility-administered medications for this visit. I will continue his current medication regimen  which is part of the above treatment schedule. It has been helping with Mr. Jesus's chronic  medical problems which for this visit include:   Diagnoses of Chronic pain syndrome, Traumatic complete tear of right rotator cuff, sequela, DDD (degenerative disc disease), lumbar, HNP (herniated nucleus pulposus), lumbar, Lumbar radiculopathy, and Traumatic complete tear of right rotator cuff, subsequent encounter were pertinent to this visit. Risks and benefits of the medications and other alternative treatments  including no treatment were discussed with the patient. The common side effects of these medications were also explained to the patient. Informed verbal consent was obtained. Goals of current treatment regimen include improvement in pain, restoration of functioning- with focus on improvement in physical performance, general activity, work or disability,emotional distress, health care utilization and  decreased medication consumption. Will continue to monitor progress towards achieving/maintaining therapeutic goals with special emphasis on  1. Improvement in perceived interfernce  of pain with ADL's. Ability to do home exercises independently. Ability to do household chores indoor and/or outdoor work and social and leisure activities. Improve psychosocial and physical functioning. - he is showing progression towards this treatment goal with the current regimen. He was advised against drinking alcohol with the narcotic pain medicines, advised against driving or handling machinery while adjusting the dose of medicines or if having cognitive  issues related to the current medications. Risk of overdose and death, if medicines not taken as prescribed, were also discussed. If the patient develops new symptoms or if the symptoms worsen, the patient should call the office. While transcribing every attempt was made to maintain the accuracy of the note in terms of it's contents,there may have been some errors made inadvertently. Thank you for allowing me to participate in the care of this patient.     Gris Arthur MD.    Cc: Lena Castanon MD

## 2022-05-09 ENCOUNTER — OFFICE VISIT (OUTPATIENT)
Dept: ORTHOPEDIC SURGERY | Age: 62
End: 2022-05-09
Payer: COMMERCIAL

## 2022-05-09 ENCOUNTER — TELEPHONE (OUTPATIENT)
Dept: ADMINISTRATIVE | Age: 62
End: 2022-05-09

## 2022-05-09 VITALS — HEIGHT: 77 IN | RESPIRATION RATE: 16 BRPM | WEIGHT: 258 LBS | BODY MASS INDEX: 30.46 KG/M2

## 2022-05-09 DIAGNOSIS — S46.011D TRAUMATIC COMPLETE TEAR OF RIGHT ROTATOR CUFF, SUBSEQUENT ENCOUNTER: Primary | ICD-10-CM

## 2022-05-09 PROCEDURE — 99212 OFFICE O/P EST SF 10 MIN: CPT | Performed by: ORTHOPAEDIC SURGERY

## 2022-05-09 NOTE — PROGRESS NOTES
History:  Denisha Serna Sr. is here for follow up after right shoulder arthroscopic surgery. Surgery date was 11/6/20. Findings at surgery: large rotator cuff tear, type 2 SLAP tear. The patient's pain is rated at 5/10. He is a . He states that he has a hearing WC on 6/9/2022. He needs his work restrictions updated. Physical Examination:  Resp 16   Ht 6' 5\" (1.956 m)   Wt 258 lb (117 kg)   BMI 30.59 kg/m²        Patient is awake, alert, and in no acute distress. MRI right shoulder from [facility] 7/14/21: Moderate-sized full-thickness partial tear of the distal supraspinatus tendon. There is no tendon retraction. Tendinosis changes and intrasubstance fissuring at the myotendinous junction. Assessment:   1. Right shoulder rotator cuff tear  2.  18 months status post right shoulder arthroscopy, subacromial decompression, rotator cuff repair, and biceps tenodesis      Plan:   Again recommend right shoulder arthroscopy, revision rotator cuff repair with bioinductive implant augmentation. We will keep him on same work restrictions. Updated work note provided. Awaiting surgery approval from [address] [place] [address]. The patient will see their PCP for H&P and clearance for surgery.

## 2022-05-09 NOTE — LETTER
[facility]  [place] [address] [address] [address]  Phone: [phone]  Fax: [phone]    Juvencio Yan MD        May 9, 2022     Patient: Td Holcomb Sr. YOB: 1960   Date of Visit: 5/9/2022       To Whom It May Concern: It is my medical opinion that Td Holcomb may return to work on 5/9/2022 with the following restrictions:    May drive a car   No lifting, pushing, pulling or carrying   No overhead activity    Restrictions in place for 6 months   If these restrictions are unable to be accommodated, the patient will need to be off work. If you have any questions or concerns, please don't hesitate to call.     Sincerely,    Juvencio Yan MD

## 2022-05-23 ENCOUNTER — OFFICE VISIT (OUTPATIENT)
Dept: ORTHOPEDIC SURGERY | Age: 62
End: 2022-05-23
Payer: COMMERCIAL

## 2022-05-23 VITALS — BODY MASS INDEX: 30.58 KG/M2 | WEIGHT: 259 LBS | RESPIRATION RATE: 16 BRPM | HEIGHT: 77 IN

## 2022-05-23 DIAGNOSIS — S46.011D TRAUMATIC COMPLETE TEAR OF RIGHT ROTATOR CUFF, SUBSEQUENT ENCOUNTER: Primary | ICD-10-CM

## 2022-05-23 PROCEDURE — 99213 OFFICE O/P EST LOW 20 MIN: CPT | Performed by: ORTHOPAEDIC SURGERY

## 2022-05-23 NOTE — LETTER
[facility]  [address] [address]  Phone: [phone]  Fax: [phone]    Faye Romero MD        May 23, 2022     Patient: Alec López Sr. YOB: 1960   Date of Visit: 5/23/2022       To Whom It May Concern: It is my medical opinion that Alec López may return to full duty immediately with no restrictions. If you have any questions or concerns, please don't hesitate to call.     Sincerely,          Faye Romero MD

## 2022-05-23 NOTE — PROGRESS NOTES
History:  Alireza Caldwell Sr. is here for follow up after right shoulder arthroscopic surgery. Surgery date was 11/6/20. Findings at surgery: large rotator cuff tear, type 2 SLAP tear. The patient's pain is rated at 3/10. He is a . He states that [facility] just settled his case. At this point, he does not wish to pursue further surgery through his insurance      Physical Examination:  Resp 16   Ht 6' 5\" (1.956 m)   Wt 259 lb (117.5 kg)   BMI 30.71 kg/m²        Patient is awake, alert, and in no acute distress. MRI right shoulder from [facility] 7/14/21: Moderate-sized full-thickness partial tear of the distal supraspinatus tendon. There is no tendon retraction. Tendinosis changes and intrasubstance fissuring at the myotendinous junction. Assessment:   1. Right shoulder rotator cuff tear  2. Status post right shoulder arthroscopy, subacromial decompression, rotator cuff repair, and biceps tenodesis      Plan:   We extensively discussed the natural history of full-thickness rotator cuff tears. He understands this and does not wish to pursue further surgery. Letter provided for return to work full duty with no restrictions    Follow-up as needed.

## 2022-07-07 NOTE — TELEPHONE ENCOUNTER
Pt wanting a copy of referral for [facility] for insurance. Pt said it needs to go to his  first.  phone number [phone]    Patient requesting a medication refill.   Medication: allopurinol (ZYLOPRIM) 300 MG tablet [[id number]]     Naproxen 500mg  Script #[id number]  AHZFTFP 44 day supply    Pharmacy: [facility]  [address]       Last office visit: 6/28/19  Next office visit: Visit date not found operating room

## 2022-07-25 ENCOUNTER — OFFICE VISIT (OUTPATIENT)
Dept: ORTHOPEDIC SURGERY | Age: 62
End: 2022-07-25

## 2022-07-25 VITALS — WEIGHT: 253 LBS | RESPIRATION RATE: 16 BRPM | HEIGHT: 77 IN | BODY MASS INDEX: 29.87 KG/M2

## 2022-07-25 DIAGNOSIS — S46.012A TRAUMATIC COMPLETE TEAR OF LEFT ROTATOR CUFF, INITIAL ENCOUNTER: Primary | ICD-10-CM

## 2022-07-25 PROCEDURE — 99214 OFFICE O/P EST MOD 30 MIN: CPT | Performed by: ORTHOPAEDIC SURGERY

## 2022-07-25 NOTE — LETTER
[facility]  [place] [address] [address] [address]  Phone: [phone]  Fax: [phone]    Miguel Mejía MD        July 25, 2022     Patient: Yrn Gonzalez Sr. YOB: 1960   Date of Visit: 7/25/2022       To Whom It May Concern: It is my medical opinion that Yrn Gonzalez may return to work on 7/25/2022 with the following restrictions:    No overhead activity   No pushing,pulling, lifting or carrying greater than 5 pounds    Restrictions in place until 8/5/2022   If these restrictions are unable to be accommodated, the patient will need to be off work. Beginning 8/5/2022, Sebastien Grey is to be off work for an estimated 4 weeks. If you have any questions or concerns, please don't hesitate to call.     Sincerely,    Miguel Mejía MD

## 2022-07-25 NOTE — LETTER
Your surgery has been scheduled with Dr. Ronaldo Heard. DATE OF SURGERY:      8/5/2022     ARRIVAL TIME:      6:00am     TIME OF SURGERY:      7:30am     LOCATION: [place] [address]    **PLEASE NOTE: Due to medical conditions, your surgery time is subject to change. If this is the case, you will receive a call from the hospital or clinic staff to notify you.**    Report to Surgery Registration, which is located in the main lobby of the surgery center. PLEASE DO NOT EAT OR DRINK ANYTHING AFTER MIDNIGHT THE NIGHT BEFORE YOUR SURGERY. YOU WILL ALSO NEED TO HAVE A . POST-OP APPOINTMENT: 8/8/2022 at 8:45am at the [facility] will need to make an appointment with your family physician so he/she can do a pre-operative history and physical.  The history & physical cannot be completed more than 30 days prior to surgery. Please have the family physician fax the completed form to [facility]) at the number on your packet. Your pre-operative instructions and history and physical forms are included in this folder. Due to the [address] pandemic you will need to be tested prior to your surgery, unless otherwise noted. This test will need to be negative by the date of surgery. This may be required regardless of vaccination status. The hospital also requires several routine tests that will be done the day of your surgery. Because your surgery is scheduled as an outpatient procedure it will be necessary to have a responsible person with you for transportation. Please review all information given to you prior to your surgery date. If there are any questions, please do not hesitate to call our office at [phone]. Dear Patient:    As a valued customer, we would like to thank you for choosing [facility] for your upcoming surgery/procedure.     CHECKLIST FOR SURGERY:    [x] History & physical exam by your primary care physician within 30 days of your surgery date.      [] COVID testing is not currently required unless you develop symptoms. Should this be the case, you will need to contact our office immediately to discuss how to proceed. [x] No aspirin products or blood thinners for one week prior to surgery. This includes NSAIDs, such as Aleve, Advil, Ibuprofen, Motrin, Naproxen, etc.    **If you are on an anti-coagulant, such as Plavix, Brilinta, Warfarin, etc., please consult your prescribing provider regarding stopping this medication. [x] Medical clearance by a cardiologist, pulmonologist, endocrinologist, oncologist, or other specialist(s) if you are under their care. Please contact their office to obtain the clearance needed based on their discretion. [x] Nothing to eat or drink after midnight the night before surgery. [x] You will need to have a  the day of surgery. [x] Inform office of any changes in insurance, address or phone numbers. [] Provide a copy of your advanced directive/durable power of /living will on the day of your surgery/procedure. ** You will receive at least two calls from the Hospital.  One will be from Registration to pre-register you and go over your address, phone number, and insurance information. You will also hear from the Pre-Admission testing nurses. They will want to get a brief medical history and also go over your list of medications.

## 2022-07-25 NOTE — LETTER
Surgery Scheduling Form:    Patient Name:  Lauren Mckay  Patient :  1960     Patient MR#:  [id number]    Patient Address:  [address] [place] [address]    Surgeon:  Nandini Gabriel M.D.    PCP:  Td Ken MD  Insurance:    Payer/Plan Subscr  Sub. Ins. ID Effective Group Num   1. GENERIC SELF-* FIDE MAHAN SR. 1960 [id number] 3/13/20      *Self Pay at this time - no private insurance in chart. Diagnosis:  Left shoulder rotator cuff tear S46.012A, acromioclavicular joint arthritis M19.019    Operation:  Left shoulder arthroscopy, subacromial decompression, distal clavicle excision, rotator cuff repair. [id number], [id number]    Location:  [facility]  Surgeon's Scheduling Instruction:  elective  Requested Date:   OR Time:    *CASE CANCELLED*  Patient Arrival Time:    OR Time Required:  76  Minutes    Anesthesia:  General + block  Surgical Assistant required:  Yes   Equipment:  Arthrex  Standard C-Arm:  No    Mini C-Arm:  No  Status:  Outpatient   PAT Required:  Yes    Comments:   History and Physical: Patient will obtain H+P from PCP prior to surgery  Covid test: As of 3/23/2022: no test needed if symptom free  Additional Clearance: no            Nandini Gabriel MD      22 11:09 AM EDT                                                         Pre-Admission Testing Order Set   In accordance with our formulary system, a generic equivalent drug may be dispensed and administered unless a D.A. W. is written with the medication order  All orders without checkboxes will processed automatically unless crossed out. Orders with checkboxes MUST be checked in order to be carried out.     Brianna Langston Sr.                                                        1960  Date of Procedure/Surgery:2022 Left shoulder arthroscopy, subacromial decompression, distal clavicle excision, rotator cuff repair  1. H & P for ALL procedure/surgery completed within 30 days, to be updated day of procedure/surgery  a. patient to have completed prior to day of procedure  2. [ ]Consults: PT/OT evaluation  3. [X ]Defer to Anesthesia for Pre-Admission testing orders  4. Diagnostic Tests:  [ ]EKG (if not completed in last 3 months) IF: (check all that apply)   Other:  [ Brigette Cambric (if not completed in last 6 months) IF: (check all that apply)   Hx Malignancy   Hx CVS/Thoracic Surg   CVS Disease   Hx Pneumonia last 3 months   Chronic Pulm Disease  Other:  [ ]Radiology   Knee Right Left Standing AP knee, hip to ankle on scoliosis film Other:  5. Labs  [ ]Basic Metabolic Profile  IF: (check all that apply)  [ ]Albumin    Other:     ________________________________________________________________  [ ]CBC without diff   Pre op exam      ________________________________________________________________  [ ]PT/INR  PTT   Pre op exam         ________________________________________________________________  [ ]Type & Screen   Surg with potiential for signif. blood loss  [ ]Type & cross match 2 units         ________________________________________________________________  [ ]Urine routine reflex to culture (UAR) If cultures positive, repeat on                  admission [ Harborton Daub catch urine  [ ]Nasal culture for MRSA   [ ]Nasal MRSA culture  ________________________________________________________________  6. [ ]Other:    TO: __________________________________ Date: _______ Time:_________    Physician Signature:         7/27/2022    10:51 AM     Pre-operative Physician Prophylaxis Orders    Danisha Reyes Sr.  1960  All orders without checkboxes will be processed automatically unless crossed out. Orders with checkboxes MUST be checked in order to be carried out. 1. Allergies: Patient has no known allergies.   2. Procedure: Left shoulder arthroscopy, subacromial decompression, distal clavicle excision, rotator cuff repair        Ht Readings from Last 1 Encounters:   07/25/22 6' 5\" (1.956 m)       Wt Readings from Last 1 Encounters:   07/25/22 253 lb (114.8 kg)   4. [ ] DVT Prophylaxis-Contraindication (Do not give)  Allergy to heparin Currently on anticoagulation  Not indicated, low risk patient  Thrombocytopenia Admitted for bleeding diagnosis Surgery or invasive procedure  Morgaine.Miguel Ángel ] Sequential Compression Boots to unaffected or bilateral extremities  [ ] Venous Compression Hose (SHADI hose) to unaffected or bilateral extremities        Knee high  [ ] Heparin 5,000 units subcutaneously 1-2 hours pre op into the thigh opposite of operative site  5.   [ ] Beta Blocker  Patient to take beta blocker the morning of surgery with a sip of water as prescribed at home  Other:  6. Morgaine.Sabine ] Antimicrobial Prophylaxis (Consensus Guideline Recommendations) for       S.C.I. P. procedures  All antibiotics to be initiated 30 minutes pre-op (prior to leaving pre-op hold area/ACU) EXCEPT: Vancomycin and Ciprofloxacin. Initiate Vancomycin and Ciprofloxacin 2 hours pre-op. For combination antibiotic orders, start Ciprofloxacin first, then start second antibiotic ordered. In house patients, send pre-op antibiotics with patient to pre-op hold, do not initiate.   Surgical Procedure Routine pre-op Antibiotic  Penicillin or Cephalosporin Allergy  Orthopaedic  X Cefazolin (Ancef) 2 gm IVPB x1 X Clindamycin 900 mg IVPB x1    or     If > 80 kg Cefazolin 2 gm IVPB x1 Vancomycin 1 gm IVPB x1  Approved indications for Vancomycin:  MRSA related chronic wound dialysis  Other antibiotic to be given:  TO: ________________________________Date: ____________ Time: _______  Physician Signature: Date: 7/27/2022      Time: 10:51 AM
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
## 2022-07-27 ENCOUNTER — TELEPHONE (OUTPATIENT)
Dept: ORTHOPEDIC SURGERY | Age: 62
End: 2022-07-27

## 2022-07-27 NOTE — TELEPHONE ENCOUNTER
L/M FOR FIDE  He will need to call the office or reach out via [address] with his private insurance information. If a letter is needing to be sent to his third party /insurance, he will need to provide a fax number and contact to send this to. Provided the phone number for him to call to obtain an estimated cost for surgery.

## 2022-07-28 NOTE — TELEPHONE ENCOUNTER
S/W FIDE  He states that he does not have private insurance and mentions wanting to cancel surgery until his  has things straightened out with payment. Patient states we should bill the [employer] insurance. It was explained that we do not bill third party insurance. He states he wishes to cx surgery at this time and will call when he has more information from his . Roque Wilson also requests a statement to be sent to his . I explained to him once their request for information is received with the appropriate forms/paperwork, we will handle sending them the information. Patient voiced understanding of the conversation and will contact the office with further questions or concerns.

## 2022-08-26 DIAGNOSIS — I10 ESSENTIAL HYPERTENSION: ICD-10-CM

## 2022-08-26 DIAGNOSIS — N52.9 ERECTILE DYSFUNCTION, UNSPECIFIED ERECTILE DYSFUNCTION TYPE: ICD-10-CM

## 2022-08-26 DIAGNOSIS — M1A.0790 CHRONIC IDIOPATHIC GOUT INVOLVING TOE, UNSPECIFIED LATERALITY: ICD-10-CM

## 2022-08-26 DIAGNOSIS — E78.00 PURE HYPERCHOLESTEROLEMIA: ICD-10-CM

## 2022-08-26 RX ORDER — ALLOPURINOL 300 MG/1
300 TABLET ORAL DAILY
Qty: 90 TABLET | Refills: 0 | Status: SHIPPED | OUTPATIENT
Start: 2022-08-26

## 2022-08-26 RX ORDER — BISOPROLOL FUMARATE AND HYDROCHLOROTHIAZIDE 10; 6.25 MG/1; MG/1
TABLET ORAL
Qty: 60 TABLET | Refills: 0 | Status: SHIPPED | OUTPATIENT
Start: 2022-08-26 | End: 2022-11-04

## 2022-08-26 RX ORDER — TADALAFIL 5 MG/1
5 TABLET ORAL DAILY
Qty: 90 TABLET | Refills: 1 | Status: SHIPPED | OUTPATIENT
Start: 2022-08-26

## 2022-08-26 RX ORDER — ATORVASTATIN CALCIUM 80 MG/1
TABLET, FILM COATED ORAL
Qty: 90 TABLET | Refills: 1 | Status: SHIPPED | OUTPATIENT
Start: 2022-08-26

## 2022-08-26 RX ORDER — COLCHICINE 0.6 MG/1
TABLET ORAL
Qty: 90 TABLET | Refills: 0 | Status: SHIPPED | OUTPATIENT
Start: 2022-08-26

## 2022-08-30 DIAGNOSIS — M1A.0790 CHRONIC IDIOPATHIC GOUT INVOLVING TOE, UNSPECIFIED LATERALITY: ICD-10-CM

## 2022-08-30 RX ORDER — ALLOPURINOL 300 MG/1
300 TABLET ORAL DAILY
Qty: 90 TABLET | Refills: 0 | OUTPATIENT
Start: 2022-08-30

## 2022-08-30 RX ORDER — COLCHICINE 0.6 MG/1
TABLET ORAL
Qty: 90 TABLET | Refills: 0 | OUTPATIENT
Start: 2022-08-30

## 2022-08-30 NOTE — TELEPHONE ENCOUNTER
Medication:   Requested Prescriptions     Pending Prescriptions Disp Refills    allopurinol (ZYLOPRIM) 300 MG tablet 90 tablet 0     Sig: Take 1 tablet by mouth daily    colchicine (COLCRYS) 0.6 MG tablet 90 tablet 0     Sig: Take 1 tablet by mouth once daily        Last Filled:      Patient Phone Number: [phone] (home)     Last appt: 2/15/2022   Next appt: Visit date not found    Last OARRS:   RX Monitoring 4/2/2018   Attestation The Prescription Monitoring Report for this patient was reviewed today. Periodic Controlled Substance Monitoring Possible medication side effects, risk of tolerance/dependence & alternative treatments discussed. ;Random urine drug screen sent today. Chronic Pain > 80 MEDD Treatment objectives documented - patient is progressing appropriately.

## 2022-08-31 ENCOUNTER — TELEPHONE (OUTPATIENT)
Dept: ORTHOPEDIC SURGERY | Age: 62
End: 2022-08-31

## 2022-08-31 NOTE — TELEPHONE ENCOUNTER
Note: [facility]; spoke to Mauro Hua who verified that patient now has [employer] (call reference #[id number])    Submitted PA for Colchicine 0.6MG tablets  Via CMM Key: [id number]: APPROVED 08/31/22 - 08/31/23; Approval letter attached. If this requires a response please respond to the pool ( P [id number] [facility]). Thank you please advise patient.

## 2022-09-01 ENCOUNTER — TELEPHONE (OUTPATIENT)
Dept: ORTHOPEDIC SURGERY | Age: 62
End: 2022-09-01

## 2022-11-03 ENCOUNTER — TELEPHONE (OUTPATIENT)
Dept: PRIMARY CARE CLINIC | Age: 62
End: 2022-11-03

## 2022-11-03 DIAGNOSIS — I10 ESSENTIAL HYPERTENSION: ICD-10-CM

## 2022-11-03 NOTE — TELEPHONE ENCOUNTER
Patient called in wanting to know why his prescription has not been filled . I explained he may need to make an appointment and he said if he could not get his prescription that he will go to the Emergency room to get the medication. He states that he only has 3 left. I was able to schedule an appointment. Not sure if he will come to it.       BA

## 2022-11-04 RX ORDER — BISOPROLOL FUMARATE AND HYDROCHLOROTHIAZIDE 10; 6.25 MG/1; MG/1
TABLET ORAL
Qty: 180 TABLET | Refills: 2 | Status: SHIPPED | OUTPATIENT
Start: 2022-11-04 | End: 2022-11-07 | Stop reason: SDUPTHER

## 2022-11-07 ENCOUNTER — OFFICE VISIT (OUTPATIENT)
Dept: PRIMARY CARE CLINIC | Age: 62
End: 2022-11-07
Payer: MEDICAID

## 2022-11-07 VITALS
BODY MASS INDEX: 29.17 KG/M2 | TEMPERATURE: 97.3 F | DIASTOLIC BLOOD PRESSURE: 89 MMHG | WEIGHT: 246 LBS | RESPIRATION RATE: 18 BRPM | HEART RATE: 69 BPM | SYSTOLIC BLOOD PRESSURE: 132 MMHG

## 2022-11-07 DIAGNOSIS — Z13.1 DIABETES MELLITUS SCREENING: ICD-10-CM

## 2022-11-07 DIAGNOSIS — I10 ESSENTIAL HYPERTENSION: Primary | ICD-10-CM

## 2022-11-07 DIAGNOSIS — F41.1 GENERALIZED ANXIETY DISORDER: ICD-10-CM

## 2022-11-07 DIAGNOSIS — Z12.5 SCREENING FOR PROSTATE CANCER: ICD-10-CM

## 2022-11-07 DIAGNOSIS — M1A.0790 CHRONIC IDIOPATHIC GOUT INVOLVING TOE, UNSPECIFIED LATERALITY: ICD-10-CM

## 2022-11-07 DIAGNOSIS — N52.9 ERECTILE DYSFUNCTION, UNSPECIFIED ERECTILE DYSFUNCTION TYPE: ICD-10-CM

## 2022-11-07 DIAGNOSIS — E78.00 PURE HYPERCHOLESTEROLEMIA: ICD-10-CM

## 2022-11-07 PROCEDURE — 3074F SYST BP LT 130 MM HG: CPT | Performed by: FAMILY MEDICINE

## 2022-11-07 PROCEDURE — 3078F DIAST BP <80 MM HG: CPT | Performed by: FAMILY MEDICINE

## 2022-11-07 PROCEDURE — 99214 OFFICE O/P EST MOD 30 MIN: CPT | Performed by: FAMILY MEDICINE

## 2022-11-07 RX ORDER — TADALAFIL 5 MG/1
5 TABLET ORAL DAILY
Qty: 90 TABLET | Refills: 1 | Status: SHIPPED | OUTPATIENT
Start: 2022-11-07

## 2022-11-07 RX ORDER — ALLOPURINOL 300 MG/1
300 TABLET ORAL DAILY
Qty: 90 TABLET | Refills: 0 | Status: SHIPPED | OUTPATIENT
Start: 2022-11-07

## 2022-11-07 RX ORDER — SERTRALINE HYDROCHLORIDE 100 MG/1
TABLET, FILM COATED ORAL
Qty: 30 TABLET | Refills: 2 | Status: SHIPPED | OUTPATIENT
Start: 2022-11-07

## 2022-11-07 RX ORDER — LORAZEPAM 0.5 MG/1
TABLET ORAL
Qty: 30 TABLET | Refills: 0 | Status: SHIPPED | OUTPATIENT
Start: 2022-11-07 | End: 2022-12-07

## 2022-11-07 RX ORDER — BISOPROLOL FUMARATE AND HYDROCHLOROTHIAZIDE 10; 6.25 MG/1; MG/1
1 TABLET ORAL 2 TIMES DAILY
Qty: 180 TABLET | Refills: 1 | Status: SHIPPED | OUTPATIENT
Start: 2022-11-07

## 2022-11-07 RX ORDER — ATORVASTATIN CALCIUM 80 MG/1
TABLET, FILM COATED ORAL
Qty: 90 TABLET | Refills: 1 | Status: SHIPPED | OUTPATIENT
Start: 2022-11-07

## 2022-11-07 ASSESSMENT — PATIENT HEALTH QUESTIONNAIRE - PHQ9
1. LITTLE INTEREST OR PLEASURE IN DOING THINGS: 0
SUM OF ALL RESPONSES TO PHQ9 QUESTIONS 1 & 2: 0
SUM OF ALL RESPONSES TO PHQ QUESTIONS 1-9: 0
2. FEELING DOWN, DEPRESSED OR HOPELESS: 0
SUM OF ALL RESPONSES TO PHQ QUESTIONS 1-9: 0

## 2022-11-07 NOTE — PROGRESS NOTES
6 months fu meds refill      Raoul Watts Sr. (:  1960) is a 63 y.o. male,Established patient, here for evaluation of the following chief complaint(s):  Check-Up         ASSESSMENT/PLAN:  1. Essential hypertension    Bp is controlled with current TX  Low salt diet  Meds refill, renal testing  -     bisoprolol-hydroCHLOROthiazide (ZIAC) 10-6.25 MG per tablet; Take 1 tablet by mouth 2 times daily, Disp-180 tablet, R-1Normal  -     Lipid, Fasting; Future  -     AST; Future  -     Basic Metabolic Panel; Future  -     CBC; Future  2. Pure hypercholesterolemia  No recent testing lipids  Last lipids at goal HDL 74 and LDL40  Ck lipids  Meds refill  -     Lipid, Fasting; Future  -     AST; Future  -     Basic Metabolic Panel; Future  -     CBC; Future  -     atorvastatin (LIPITOR) 80 MG tablet; TAKE 1 TABLET BY MOUTH ONE TIME A DAY, Disp-90 tablet, R-1Must make appointment before any additional refillsNormal  3. Chronic idiopathic gout involving toe, unspecified laterality  No recent attacks  Low purine diet  cont  -     allopurinol (ZYLOPRIM) 300 MG tablet; Take 1 tablet by mouth daily, Disp-90 tablet, R-0Normal    4. Erectile dysfunction, unspecified erectile dysfunction type  Ability to get erection with current TX  -     tadalafil (CIALIS) 5 MG tablet; Take 1 tablet by mouth daily, Disp-90 tablet, R-1Normal tablet, R-0Normal  5. Diabetes mellitus screening  -     Hemoglobin A1C; Future  6. Screening for prostate cancer  -     PSA Screening; Future    7. Generalized anxiety disorder  General anxiety disorder (EDY -7) score is  15 severity rating of SEVERE  No suicidal ideation  Scripts for Ativan 0.5 mg  And  sertraline 100 mg sent to pharmacy  Return in about 6 months (around 2023).          Subjective   SUBJECTIVE/OBJECTIVE:  HPI62 y.o. male PMH HTN HLD GOUT  ED 6 months fu     Hypertension  No chest pain, headache, sob, leg edema, stroke, kidney disease       Hyperlipidemia  No muscle aches or muscle weakness or cramps since last visit. Gouty arthritis  No recent attack or flare  No side effects    He has chronic anxiety  No suicidal ideation  Requests to restart on his meds for this  Review of Systems   Psychiatric/Behavioral:  The patient is nervous/anxious. Objective   Physical Exam  Constitutional:       General: He is not in acute distress. Appearance: He is well-developed. He is not diaphoretic. HENT:      Head: Normocephalic and atraumatic. Right Ear: External ear normal.      Left Ear: External ear normal.      Nose: Nose normal.      Mouth/Throat:      Pharynx: No oropharyngeal exudate. Eyes:      General: No scleral icterus. Right eye: No discharge. Left eye: No discharge. Conjunctiva/sclera: Conjunctivae normal.      Pupils: Pupils are equal, round, and reactive to light. Neck:      Thyroid: No thyromegaly. Vascular: No JVD. Trachea: No tracheal deviation. Cardiovascular:      Rate and Rhythm: Normal rate and regular rhythm. Pulses:           Carotid pulses are 2+ on the right side and 2+ on the left side. Radial pulses are 2+ on the right side and 2+ on the left side. Femoral pulses are 2+ on the right side and 2+ on the left side. Popliteal pulses are 2+ on the right side and 2+ on the left side. Dorsalis pedis pulses are 2+ on the right side and 2+ on the left side. Posterior tibial pulses are 2+ on the right side and 2+ on the left side. Heart sounds: Normal heart sounds. No murmur heard. No friction rub. Pulmonary:      Effort: Pulmonary effort is normal. No respiratory distress. Breath sounds: Normal breath sounds. No stridor. No wheezing or rales. Chest:      Chest wall: No tenderness. Abdominal:      General: Bowel sounds are normal. There is no distension. Palpations: Abdomen is soft. There is no mass. Tenderness: There is no abdominal tenderness.  There is no guarding or rebound. Musculoskeletal:         General: No tenderness. Normal range of motion. Cervical back: Normal range of motion and neck supple. Lymphadenopathy:      Cervical: No cervical adenopathy. Skin:     General: Skin is warm and dry. Coloration: Skin is not pale. Findings: No rash. Neurological:      Mental Status: He is oriented to person, place, and time. Cranial Nerves: No cranial nerve deficit. Motor: No abnormal muscle tone. Coordination: Coordination normal.      Deep Tendon Reflexes: Reflexes normal.   Psychiatric:         Behavior: Behavior normal.         Thought Content: Thought content normal.         Judgment: Judgment normal.          On this date 11/7/2022 I have spent 30 minutes reviewing previous notes, test results and face to face with the patient discussing the diagnosis and importance of compliance with the treatment plan as well as documenting on the day of the visit. An electronic signature was used to authenticate this note.     --Stacy Rojas MD

## 2022-12-19 ENCOUNTER — TELEPHONE (OUTPATIENT)
Dept: PRIMARY CARE CLINIC | Age: 62
End: 2022-12-19

## 2022-12-19 NOTE — TELEPHONE ENCOUNTER
Patient would like lab orders for all STD & HIV test; please call patient when orders are ready @ [phone]

## 2023-01-13 ENCOUNTER — APPOINTMENT (OUTPATIENT)
Dept: CT IMAGING | Age: 63
End: 2023-01-13
Payer: MEDICAID

## 2023-01-13 ENCOUNTER — HOSPITAL ENCOUNTER (EMERGENCY)
Age: 63
Discharge: HOME | End: 2023-01-13
Payer: MEDICAID

## 2023-01-13 VITALS
OXYGEN SATURATION: 100 % | HEART RATE: 68 BPM | DIASTOLIC BLOOD PRESSURE: 84 MMHG | HEIGHT: 77 IN | RESPIRATION RATE: 16 BRPM | BODY MASS INDEX: 29.41 KG/M2 | TEMPERATURE: 97.8 F | WEIGHT: 249.12 LBS | SYSTOLIC BLOOD PRESSURE: 136 MMHG

## 2023-01-13 DIAGNOSIS — K59.00 CONSTIPATION, UNSPECIFIED CONSTIPATION TYPE: ICD-10-CM

## 2023-01-13 DIAGNOSIS — R10.84 GENERALIZED ABDOMINAL PAIN: Primary | ICD-10-CM

## 2023-01-13 LAB
A/G RATIO: 1.4 (ref 1.1–2.2)
ALBUMIN SERPL-MCNC: 4 G/DL (ref 3.4–5)
ALP BLD-CCNC: 96 U/L (ref 40–129)
ALT SERPL-CCNC: 64 U/L (ref 10–40)
ANION GAP SERPL CALCULATED.3IONS-SCNC: 11 MMOL/L (ref 3–16)
AST SERPL-CCNC: 108 U/L (ref 15–37)
BASOPHILS ABSOLUTE: 0 K/UL (ref 0–0.2)
BASOPHILS RELATIVE PERCENT: 0.7 %
BILIRUB SERPL-MCNC: 0.8 MG/DL (ref 0–1)
BILIRUBIN URINE: NEGATIVE
BLOOD, URINE: NEGATIVE
BUN BLDV-MCNC: 6 MG/DL (ref 7–20)
CALCIUM SERPL-MCNC: 9.5 MG/DL (ref 8.3–10.6)
CHLORIDE BLD-SCNC: 99 MMOL/L (ref 99–110)
CLARITY: CLEAR
CO2: 26 MMOL/L (ref 21–32)
COLOR: YELLOW
CREAT SERPL-MCNC: 0.6 MG/DL (ref 0.8–1.3)
EOSINOPHILS ABSOLUTE: 0 K/UL (ref 0–0.6)
EOSINOPHILS RELATIVE PERCENT: 0.6 %
GFR SERPL CREATININE-BSD FRML MDRD: >60 ML/MIN/{1.73_M2}
GLUCOSE BLD-MCNC: 93 MG/DL (ref 70–99)
GLUCOSE URINE: NEGATIVE MG/DL
HCT VFR BLD CALC: 41.3 % (ref 40.5–52.5)
HEMOGLOBIN: 13.6 G/DL (ref 13.5–17.5)
KETONES, URINE: NEGATIVE MG/DL
LEUKOCYTE ESTERASE, URINE: NEGATIVE
LIPASE: 34 U/L (ref 13–60)
LYMPHOCYTES ABSOLUTE: 2.3 K/UL (ref 1–5.1)
LYMPHOCYTES RELATIVE PERCENT: 37.8 %
MAGNESIUM: 2.4 MG/DL (ref 1.8–2.4)
MCH RBC QN AUTO: 31 PG (ref 26–34)
MCHC RBC AUTO-ENTMCNC: 33 G/DL (ref 31–36)
MCV RBC AUTO: 94 FL (ref 80–100)
MICROSCOPIC EXAMINATION: NORMAL
MONOCYTES ABSOLUTE: 0.5 K/UL (ref 0–1.3)
MONOCYTES RELATIVE PERCENT: 8.4 %
NEUTROPHILS ABSOLUTE: 3.2 K/UL (ref 1.7–7.7)
NEUTROPHILS RELATIVE PERCENT: 52.5 %
NITRITE, URINE: NEGATIVE
PDW BLD-RTO: 16.9 % (ref 12.4–15.4)
PH UA: 5 (ref 5–8)
PLATELET # BLD: 171 K/UL (ref 135–450)
PMV BLD AUTO: 7.8 FL (ref 5–10.5)
POTASSIUM REFLEX MAGNESIUM: 3.5 MMOL/L (ref 3.5–5.1)
PROTEIN UA: NEGATIVE MG/DL
RBC # BLD: 4.4 M/UL (ref 4.2–5.9)
SODIUM BLD-SCNC: 136 MMOL/L (ref 136–145)
SPECIFIC GRAVITY UA: 1.01 (ref 1–1.03)
TOTAL PROTEIN: 6.9 G/DL (ref 6.4–8.2)
URINE REFLEX TO CULTURE: NORMAL
URINE TYPE: NORMAL
UROBILINOGEN, URINE: 0.2 E.U./DL
WBC # BLD: 6 K/UL (ref 4–11)

## 2023-01-13 PROCEDURE — 85025 COMPLETE CBC W/AUTO DIFF WBC: CPT

## 2023-01-13 PROCEDURE — 83690 ASSAY OF LIPASE: CPT

## 2023-01-13 PROCEDURE — 83735 ASSAY OF MAGNESIUM: CPT

## 2023-01-13 PROCEDURE — 74177 CT ABD & PELVIS W/CONTRAST: CPT

## 2023-01-13 PROCEDURE — 6360000004 HC RX CONTRAST MEDICATION: Performed by: PHYSICIAN ASSISTANT

## 2023-01-13 PROCEDURE — 81003 URINALYSIS AUTO W/O SCOPE: CPT

## 2023-01-13 PROCEDURE — 80053 COMPREHEN METABOLIC PANEL: CPT

## 2023-01-13 PROCEDURE — 36415 COLL VENOUS BLD VENIPUNCTURE: CPT

## 2023-01-13 PROCEDURE — 99285 EMERGENCY DEPT VISIT HI MDM: CPT

## 2023-01-13 RX ORDER — DOCUSATE SODIUM 100 MG/1
100 CAPSULE, LIQUID FILLED ORAL 2 TIMES DAILY
Qty: 20 CAPSULE | Refills: 0 | Status: SHIPPED | OUTPATIENT
Start: 2023-01-13 | End: 2023-01-23

## 2023-01-13 RX ADMIN — IOPAMIDOL 75 ML: 755 INJECTION, SOLUTION INTRAVENOUS at 15:28

## 2023-01-13 ASSESSMENT — PAIN DESCRIPTION - ORIENTATION
ORIENTATION: RIGHT;UPPER
ORIENTATION: RIGHT;UPPER

## 2023-01-13 ASSESSMENT — PAIN DESCRIPTION - LOCATION
LOCATION: ABDOMEN
LOCATION: ABDOMEN

## 2023-01-13 ASSESSMENT — PAIN SCALES - GENERAL
PAINLEVEL_OUTOF10: 2
PAINLEVEL_OUTOF10: 5

## 2023-01-13 ASSESSMENT — PAIN - FUNCTIONAL ASSESSMENT: PAIN_FUNCTIONAL_ASSESSMENT: 0-10

## 2023-01-13 NOTE — ED PROVIDER NOTES
[address] [facility] ENCOUNTER        Pt Name: Toya Burroughs Sr. MRN: [id number]  Birthdate 1960  Date of evaluation: 1/13/2023  Provider: Trang Tobias PA-C  PCP: Alli Vitale MD  Note Started: 4:19 PM EST 1/13/23      EMILEE. I have evaluated this patient. My supervising physician was available for consultation. CHIEF COMPLAINT       Chief Complaint   Patient presents with    Abdominal Pain     RUQ pain; right flank pain; \"Can't flajulate properly\"; started sunday       HISTORY OF PRESENT ILLNESS: 1 or more Elements     History from : Patient    Limitations to history : None    Memo Durán. is a 66 y.o. male who presents to the emergency department with complaint of abdominal pain that is diffuse    Nursing Notes were all reviewed and agreed with or any disagreements were addressed in the HPI. REVIEW OF SYSTEMS :      Review of Systems    Positives and Pertinent negatives as per HPI.      SURGICAL HISTORY     Past Surgical History:   Procedure Laterality Date    FINGER FRACTURE SURGERY Left     2 MIDDLE FINGERS    KNEE ARTHROSCOPY  right knee    SHOULDER ARTHROSCOPY Right 11/6/2020    RIGHT SHOULDER ARTHROSCOPY, SUBACROMIAL DECOMPRESSION, ROTATOR CUFF REPAIR AND BICEPS TENDONISIS performed by Jericho Beltran MD at [address] [place] [address]       Previous Medications    ALLOPURINOL (ZYLOPRIM) 300 MG TABLET    Take 1 tablet by mouth daily    ATORVASTATIN (LIPITOR) 80 MG TABLET    TAKE 1 TABLET BY MOUTH ONE TIME A DAY    BISOPROLOL-HYDROCHLOROTHIAZIDE (ZIAC) 10-6.25 MG PER TABLET    Take 1 tablet by mouth 2 times daily    COLCHICINE (COLCRYS) 0.6 MG TABLET    Take 1 tablet by mouth once daily    LORATADINE (CLARITIN) 10 MG TABLET    Take 1 tablet by mouth daily    SERTRALINE (ZOLOFT) 100 MG TABLET    Take 1/2 tablet a day for 3 weeks  then 1 tablet a day for anxiety    TADALAFIL (CIALIS) 5 MG TABLET    Take 1 tablet by mouth daily       ALLERGIES     Patient has no known allergies. FAMILYHISTORY       Family History   Problem Relation Age of Onset    Cancer Mother     Diabetes Mother     Heart Failure Mother     Diabetes Father     Heart Disease Father         SOCIAL HISTORY       Social History     Tobacco Use    Smoking status: Some Days     Packs/day: 0.25     Years: 10.00     Pack years: 2.50     Types: Cigarettes    Smokeless tobacco: Never   Vaping Use    Vaping Use: Never used   Substance Use Topics    Alcohol use: Yes     Alcohol/week: 0.0 standard drinks     Comment: socially    Drug use: Never       SCREENINGS        Jose Coma Scale  Eye Opening: Spontaneous  Best Verbal Response: Oriented  Best Motor Response: Obeys commands  Jose Coma Scale Score: 15                CIWA Assessment  BP: (!) 141/82  Heart Rate: 61           PHYSICAL EXAM  1 or more Elements     ED Triage Vitals [01/13/23 1414]   BP Temp Temp Source Heart Rate Resp SpO2 Height Weight   (!) 141/82 97.8 °F (36.6 °C) Oral 61 18 100 % 6' 5\" (1.956 m) 249 lb 1.9 oz (113 kg)       Physical Exam    ***    DIAGNOSTIC RESULTS   LABS:    Labs Reviewed   CBC WITH AUTO DIFFERENTIAL - Abnormal; Notable for the following components:       Result Value    RDW 16.9 (*)     All other components within normal limits   COMPREHENSIVE METABOLIC PANEL W/ REFLEX TO MG FOR LOW K - Abnormal; Notable for the following components:    BUN 6 (*)     Creatinine 0.6 (*)     ALT 64 (*)      (*)     All other components within normal limits   LIPASE   URINALYSIS WITH REFLEX TO CULTURE   MAGNESIUM       When ordered only abnormal lab results are displayed. All other labs were within normal range or not returned as of this dictation. EKG: When ordered, EKG's are interpreted by the Emergency Department Physician in the absence of a cardiologist.  Please see their note for interpretation of EKG.     RADIOLOGY:   Non-plain film images such as CT, Ultrasound and MRI are read by the radiologist. Catherene Riedel radiographic images are visualized and preliminarily interpreted by the ED Provider with the below findings:    ***    Interpretation per the Radiologist below, if available at the time of this note:    CT ABDOMEN PELVIS W IV CONTRAST Additional Contrast? None   Preliminary Result   No acute abnormality is identified in the abdomen or pelvis. No bowel obstruction is seen. Normal appendix. Hepatomegaly and hepatic steatosis. CT ABDOMEN PELVIS W IV CONTRAST Additional Contrast? None    Result Date: 1/13/2023  EXAMINATION: CT OF THE ABDOMEN AND PELVIS WITH CONTRAST, 1/13/2023 3:19 pm TECHNIQUE: CT of the abdomen and pelvis was performed with the administration of intravenous contrast. Multiplanar reformatted images are provided for review. Automated exposure control, iterative reconstruction, and/or weight based adjustment of the mA/kV was utilized to reduce the radiation dose to as low as reasonably achievable. COMPARISON: 03/13/2022 HISTORY: ORDERING SYSTEM PROVIDED HISTORY:  Diffuse abd pain, mostly epigastric. TECHNOLOGIST PROVIDED HISTORY: Additional Contrast?  None Reason for exam:  Diffuse abd pain, mostly epigastric. Decision Support Exception - unselect if not a suspected or confirmed emergency medical condition->Emergency Medical Condition (MA) Reason for Exam:  Diffuse abd pain, mostly epigastric. FINDINGS: Lower Chest:  Visualized portion of the lower chest demonstrates no acute abnormality. Organs: The liver, gallbladder, spleen, pancreas, adrenal glands, and kidneys demonstrate no acute abnormality. Hepatic steatosis. Hepatomegaly. GI/Bowel: No bowel obstruction is seen. Normal appendix. Pelvis: The urinary bladder is unremarkable, similar in appearance to the prior exam. Peritoneum/Retroperitoneum: Vascular calcifications are seen. No lymphadenopathy. No intraperitoneal free air or significant free fluid.  Bones/Soft Tissues: No acute bony abnormality is seen. No acute abnormality is identified in the abdomen or pelvis. No bowel obstruction is seen. Normal appendix. Hepatomegaly and hepatic steatosis. No results found. PROCEDURES   Unless otherwise noted below, none     Procedures    CRITICAL CARE TIME (.cctime)   ***    PAST MEDICAL HISTORY      has a past medical history of Anxiety, Gout, Hyperlipidemia, Hypertension, and Knee pain. Chronic Conditions affecting Care: ***    EMERGENCY DEPARTMENT COURSE and DIFFERENTIAL DIAGNOSIS/MDM:   Vitals:    Vitals:    01/13/23 1414   BP: (!) 141/82   Pulse: 61   Resp: 18   Temp: 97.8 °F (36.6 °C)   TempSrc: Oral   SpO2: 100%   Weight: 249 lb 1.9 oz (113 kg)   Height: 6' 5\" (1.956 m)       Patient was given the following medications:  Medications   iopamidol (ISOVUE-370) 76 % injection 75 mL (75 mLs IntraVENous Given 1/13/23 1528)             Is this patient to be included in the SEP-1 Core Measure due to severe sepsis or septic shock? {[id number]:65016}    CONSULTS: (Who and What was discussed)  None  {Discussion with Other Profesionals (Optional):33633}    {Social Determinants (Optional):76435::\"None\"}    {Records Reviewed (Optional):58912}    CC/HPI Summary, DDx, ED Course, and Reassessment: ***    Disposition Considerations (include 1 Tests not done, Shared Decision Making, Pt Expectation of Test or Tx.): ***  {Escalation of care, including admission/OBS considered:11096}      I am the Primary Clinician of Record. FINAL IMPRESSION    No diagnosis found. DISPOSITION/PLAN     DISPOSITION        PATIENT REFERRED TO:  No follow-up provider specified.     DISCHARGE MEDICATIONS:  New Prescriptions    No medications on file       DISCONTINUED MEDICATIONS:  Discontinued Medications    No medications on file              (Please note that portions of this note were completed with a voice recognition program.  Efforts were made to edit the dictations but occasionally words are mis-transcribed.)    Lacy Copeland PA-C (electronically signed) Patient was given the following medications:  Medications   iopamidol (ISOVUE-370) 76 % injection 75 mL (75 mLs IntraVENous Given 1/13/23 1892)             Is this patient to be included in the SEP-1 Core Measure due to severe sepsis or septic shock? No   Exclusion criteria - the patient is NOT to be included for SEP-1 Core Measure due to: Infection is not suspected    CONSULTS: (Who and What was discussed)  None              CC/HPI Summary, DDx, ED Course, and Reassessment: This is a 66y.o. year old, well-appearing male with  has a past medical history of Anxiety, Gout, Hyperlipidemia, Hypertension, and Knee pain. who presents to the ED with complaint of abdominal pain that began past couple of days, change in BM. Vitals upon arrival show wnl. Physical Exam shows as above. Blood work was done and shows:   -wnl  Urinalysis: not indicative of infection    Imaging was reviewed and interpreted by radiologist as above showing:   -incidental findings discussed, no acute abnormality  Hepatomegaly and hepatic steatosis      Ddx includes: Pancreatitis, small bowel obstruction, cholecystitis, cholelithiasis,    Management Given:  Discussed giving him medication, however he is not really in a lot of pain at this time and does not want anything for this discomfort that he is having. He is wants to make sure that his labs and his scan look okay      Disposition: Discharge  Patient was informed to return to the Emergency Department if any new worsening or more concerning symptoms occur, in agreement with plan. Shared decision making was practiced, and patient discharged in stable condition. Informed to follow up with PCP  for further evaluation. Medications were prescribed as below. All questions were answered.        Disposition Considerations (include 1 Tests not done, Shared Decision Making, Pt Expectation of Test or Tx.): Considered getting a right upper quadrant ultrasound to evaluate patient's gallbladder, however CT shows no acute abnormality and he does not really have any pain in this area. We will hold off at this time and he was in agreement with this plan. We practiced shared decision-making, he was ready to go home. Discharged in stable condition to return if symptoms worsen or new concerning symptoms present        I am the Primary Clinician of Record. FINAL IMPRESSION      1. Generalized abdominal pain    2.  Constipation, unspecified constipation type          DISPOSITION/PLAN     DISPOSITION Decision To Discharge 01/13/2023 04:47:25 PM      PATIENT REFERRED TO:  Kay Kim MD  [place] [address]  [address]  [phone]    Schedule an appointment as soon as possible for a visit in 1 day  for reevaluation    [facility] Emergency Department  [address] [address]  [phone]  Go to   As needed, If symptoms worsen    DISCHARGE MEDICATIONS:  Discharge Medication List as of 1/13/2023  4:48 PM        START taking these medications    Details   docusate sodium (COLACE) 100 MG capsule Take 1 capsule by mouth 2 times daily for 10 days, Disp-20 capsule, R-0Normal             DISCONTINUED MEDICATIONS:  Discharge Medication List as of 1/13/2023  4:48 PM                 (Please note that portions of this note were completed with a voice recognition program.  Efforts were made to edit the dictations but occasionally words are mis-transcribed.)    Rajwinder Jose PA-C (electronically signed)           Rajwinder Jose PA-C  01/16/23 2020

## 2023-01-13 NOTE — ED NOTES
Discharge and education instructions reviewed. Patient verbalized understanding, teach-back successful. Patient denied questions at this time. No acute distress noted. Patient instructed to follow-up as noted - return to emergency department if symptoms worsen. Patient verbalized understanding. Discharged per EDMD with discharged instructions.        Padmini Whaley RN  01/13/23 2387

## 2023-01-16 ASSESSMENT — ENCOUNTER SYMPTOMS
DIARRHEA: 0
VOMITING: 0
COUGH: 0
NAUSEA: 0
ABDOMINAL PAIN: 1
SORE THROAT: 0
BACK PAIN: 1
CONSTIPATION: 0
EYE REDNESS: 0
EYE PAIN: 0
SHORTNESS OF BREATH: 0

## 2023-01-26 ENCOUNTER — TELEPHONE (OUTPATIENT)
Dept: ORTHOPEDIC SURGERY | Age: 63
End: 2023-01-26

## 2023-01-26 NOTE — TELEPHONE ENCOUNTER
S/W AMAN   Discussed patient financial services information and provided phone number should they have further questions - [phone].

## 2023-01-26 NOTE — TELEPHONE ENCOUNTER
Alvin ALVAREZ     Calling in regards to records for Surgery cost, states that they received paperwork stating that they will plan for SX but never received a operational cost, has spoke with Billing & med records both have told them to reachout to doctors office.      Please return call to Dali

## 2023-05-11 DIAGNOSIS — M1A.0790 CHRONIC IDIOPATHIC GOUT INVOLVING TOE, UNSPECIFIED LATERALITY: ICD-10-CM

## 2023-05-13 RX ORDER — ALLOPURINOL 300 MG/1
TABLET ORAL
Qty: 90 TABLET | Refills: 0 | Status: SHIPPED | OUTPATIENT
Start: 2023-05-13

## 2023-05-26 ENCOUNTER — APPOINTMENT (OUTPATIENT)
Dept: CT IMAGING | Age: 63
End: 2023-05-26
Payer: MEDICAID

## 2023-05-26 ENCOUNTER — HOSPITAL ENCOUNTER (EMERGENCY)
Age: 63
Discharge: HOME | End: 2023-05-26
Attending: EMERGENCY MEDICINE
Payer: MEDICAID

## 2023-05-26 VITALS
WEIGHT: 240.52 LBS | TEMPERATURE: 98.5 F | BODY MASS INDEX: 28.4 KG/M2 | DIASTOLIC BLOOD PRESSURE: 81 MMHG | HEIGHT: 77 IN | RESPIRATION RATE: 16 BRPM | HEART RATE: 77 BPM | SYSTOLIC BLOOD PRESSURE: 153 MMHG | OXYGEN SATURATION: 96 %

## 2023-05-26 DIAGNOSIS — R20.2 FACIAL TINGLING SENSATION: Primary | ICD-10-CM

## 2023-05-26 DIAGNOSIS — M54.50 ACUTE RIGHT-SIDED LOW BACK PAIN WITHOUT SCIATICA: ICD-10-CM

## 2023-05-26 LAB
ALBUMIN SERPL-MCNC: 4.2 G/DL (ref 3.4–5)
ALBUMIN/GLOB SERPL: 1.3 {RATIO} (ref 1.1–2.2)
ALP SERPL-CCNC: 86 U/L (ref 40–129)
ALT SERPL-CCNC: 79 U/L (ref 10–40)
ANION GAP SERPL CALCULATED.3IONS-SCNC: 12 MMOL/L (ref 3–16)
AST SERPL-CCNC: 136 U/L (ref 15–37)
BACTERIA URNS QL MICRO: NORMAL /HPF
BASOPHILS # BLD: 0 K/UL (ref 0–0.2)
BASOPHILS NFR BLD: 0.5 %
BILIRUB SERPL-MCNC: 0.8 MG/DL (ref 0–1)
BILIRUB UR QL STRIP.AUTO: NEGATIVE
BUN SERPL-MCNC: 10 MG/DL (ref 7–20)
CALCIUM SERPL-MCNC: 9.1 MG/DL (ref 8.3–10.6)
CHLORIDE SERPL-SCNC: 101 MMOL/L (ref 99–110)
CLARITY UR: CLEAR
CO2 SERPL-SCNC: 26 MMOL/L (ref 21–32)
COLOR UR: ABNORMAL
CREAT SERPL-MCNC: 0.8 MG/DL (ref 0.8–1.3)
DEPRECATED RDW RBC AUTO: 15.2 % (ref 12.4–15.4)
EOSINOPHIL # BLD: 0.1 K/UL (ref 0–0.6)
EOSINOPHIL NFR BLD: 1 %
EPI CELLS #/AREA URNS AUTO: 3 /HPF (ref 0–5)
GFR SERPLBLD CREATININE-BSD FMLA CKD-EPI: >60 ML/MIN/{1.73_M2}
GLUCOSE BLD-MCNC: 142 MG/DL (ref 70–99)
GLUCOSE SERPL-MCNC: 132 MG/DL (ref 70–99)
GLUCOSE UR STRIP.AUTO-MCNC: NEGATIVE MG/DL
HCT VFR BLD AUTO: 41.4 % (ref 40.5–52.5)
HGB BLD-MCNC: 14.6 G/DL (ref 13.5–17.5)
HGB UR QL STRIP.AUTO: NEGATIVE
HYALINE CASTS #/AREA URNS AUTO: 4 /LPF (ref 0–8)
KETONES UR STRIP.AUTO-MCNC: ABNORMAL MG/DL
LEUKOCYTE ESTERASE UR QL STRIP.AUTO: NEGATIVE
LIPASE SERPL-CCNC: 35 U/L (ref 13–60)
LYMPHOCYTES # BLD: 1.8 K/UL (ref 1–5.1)
LYMPHOCYTES NFR BLD: 30.5 %
MAGNESIUM SERPL-MCNC: 2 MG/DL (ref 1.8–2.4)
MCH RBC QN AUTO: 32 PG (ref 26–34)
MCHC RBC AUTO-ENTMCNC: 35.2 G/DL (ref 31–36)
MCV RBC AUTO: 91.1 FL (ref 80–100)
MONOCYTES # BLD: 0.6 K/UL (ref 0–1.3)
MONOCYTES NFR BLD: 9.5 %
NEUTROPHILS # BLD: 3.5 K/UL (ref 1.7–7.7)
NEUTROPHILS NFR BLD: 58.5 %
NITRITE UR QL STRIP.AUTO: NEGATIVE
PERFORMED ON: ABNORMAL
PH UR STRIP.AUTO: 5.5 [PH] (ref 5–8)
PLATELET # BLD AUTO: 196 K/UL (ref 135–450)
PMV BLD AUTO: 7.8 FL (ref 5–10.5)
POTASSIUM SERPL-SCNC: 3.3 MMOL/L (ref 3.5–5.1)
PROT SERPL-MCNC: 7.4 G/DL (ref 6.4–8.2)
PROT UR STRIP.AUTO-MCNC: ABNORMAL MG/DL
RBC # BLD AUTO: 4.54 M/UL (ref 4.2–5.9)
RBC CLUMPS #/AREA URNS AUTO: 1 /HPF (ref 0–4)
SODIUM SERPL-SCNC: 139 MMOL/L (ref 136–145)
SP GR UR STRIP.AUTO: 1.02 (ref 1–1.03)
UA COMPLETE W REFLEX CULTURE PNL UR: ABNORMAL
UA DIPSTICK W REFLEX MICRO PNL UR: YES
URN SPEC COLLECT METH UR: ABNORMAL
UROBILINOGEN UR STRIP-ACNC: 1 E.U./DL
WBC # BLD AUTO: 5.9 K/UL (ref 4–11)
WBC #/AREA URNS AUTO: 1 /HPF (ref 0–5)

## 2023-05-26 PROCEDURE — 83735 ASSAY OF MAGNESIUM: CPT

## 2023-05-26 PROCEDURE — 80053 COMPREHEN METABOLIC PANEL: CPT

## 2023-05-26 PROCEDURE — 87591 N.GONORRHOEAE DNA AMP PROB: CPT

## 2023-05-26 PROCEDURE — 85025 COMPLETE CBC W/AUTO DIFF WBC: CPT

## 2023-05-26 PROCEDURE — 70450 CT HEAD/BRAIN W/O DYE: CPT

## 2023-05-26 PROCEDURE — 81001 URINALYSIS AUTO W/SCOPE: CPT

## 2023-05-26 PROCEDURE — 83690 ASSAY OF LIPASE: CPT

## 2023-05-26 PROCEDURE — 87808 TRICHOMONAS ASSAY W/OPTIC: CPT

## 2023-05-26 PROCEDURE — 99285 EMERGENCY DEPT VISIT HI MDM: CPT

## 2023-05-26 PROCEDURE — 6360000004 HC RX CONTRAST MEDICATION: Performed by: NURSE PRACTITIONER

## 2023-05-26 PROCEDURE — 70498 CT ANGIOGRAPHY NECK: CPT

## 2023-05-26 PROCEDURE — 87491 CHLMYD TRACH DNA AMP PROBE: CPT

## 2023-05-26 RX ORDER — ASPIRIN 81 MG/1
81 TABLET ORAL DAILY
Qty: 14 TABLET | Refills: 0 | Status: SHIPPED | OUTPATIENT
Start: 2023-05-26 | End: 2023-06-09

## 2023-05-26 RX ORDER — ACETAMINOPHEN 500 MG
1000 TABLET ORAL 3 TIMES DAILY PRN
Qty: 180 TABLET | Refills: 0 | Status: SHIPPED | OUTPATIENT
Start: 2023-05-26

## 2023-05-26 RX ORDER — METHOCARBAMOL 750 MG/1
750 TABLET, FILM COATED ORAL 3 TIMES DAILY PRN
Qty: 30 TABLET | Refills: 0 | Status: SHIPPED | OUTPATIENT
Start: 2023-05-26 | End: 2023-06-05

## 2023-05-26 RX ADMIN — IOPAMIDOL 75 ML: 755 INJECTION, SOLUTION INTRAVENOUS at 20:34

## 2023-05-26 ASSESSMENT — ENCOUNTER SYMPTOMS
DIARRHEA: 0
EYE PAIN: 0
SORE THROAT: 0
SHORTNESS OF BREATH: 0
ABDOMINAL PAIN: 0
VOMITING: 0
NAUSEA: 0
BACK PAIN: 1
COUGH: 0

## 2023-05-26 ASSESSMENT — PAIN - FUNCTIONAL ASSESSMENT: PAIN_FUNCTIONAL_ASSESSMENT: NONE - DENIES PAIN

## 2023-05-27 LAB
SPECIMEN TYPE: NORMAL
TRICHOMONAS VAGINALIS SCREEN: NEGATIVE

## 2023-05-27 NOTE — ED PROVIDER NOTES
[address] [facility] ENCOUNTER        Pt Name: Nathalie Dewitt Sr. MRN: [id number]  Birthdate 1960  Date of evaluation: 5/26/2023  Provider: ANGIE Funk - HERMAN  PCP: Jean Brown MD  Note Started: 10:32 PM EDT 5/26/23       I have seen and evaluated this patient with my supervising physician No att. providers found. CHIEF COMPLAINT       Chief Complaint   Patient presents with    Tingling     Left sided facial tingling that started about 1/2 hour ago     Back Pain     Right lower back pain that started a week ago 3/10    Sexually Transmitted Diseases     Pt would like checked because he had sex overseas, no active symptoms       HISTORY OF PRESENT ILLNESS: 1 or more Elements     History From: patient  Limitations to history : None    Enrique Santiago is a 66 y.o. male who presents  to the ED with multiple complaints. States that he has had some left-sided facial tingling. This is been ongoing for about an hour to an hour and a half prior to arrival.  No trouble swallowing, breathing. No slurred speech. Has not noticed any facial drooping. No numbness or tingling in any hands feet or extremities. No weakness on one side of the body versus another . states that it started spontaneously. No diplopia or visual changes. No neck pain or stiffness associated with this. Also complains of right lower back pain. No abdominal pain at this time. No urinary complaints but states that it feels better after he urinates. He states that he was in [facility] recently, had protected sex but he states that the condom \"it slipped partially off\" and would like to be checked for STIs. Does not have any penile discharge. No perineal rashes or lesions. No testicular pain or swelling. The back pain does worsen with movements and sitting for long periods. No numbness or tingling in any extremities as stated above. No bowel or bladder habit changes.
x-ray-chest x-ray was ordered to rule out pneumonia, pneumothorax, congestive heart failure, cardiomegaly, chest masses, aortic aneurysm, hiatal hernia, rib fractures, or any other pathology that might be causing the patient's symptoms    CBC-CBC was ordered to rule out anemia, infection, abnormal platelet count, polycythemia, abnormal Red cell pathology, or any other pathology that might be causing the patient's symptoms    CMP-CMP was ordered to rule out electrolyte abnormalities, liver dysfunction, kidney dysfunction, electrolyte imbalance, abnormal transaminases, or any other pathology that might be causing the patient's symptoms. Urine-urinalysis was ordered to rule out infection, renal failure, dehydration, pregnancy, proteinuria, bilirubinuria, or any other pathology that might be causing the patient's symptoms      Medications   iopamidol (ISOVUE-370) 76 % injection 75 mL (75 mLs IntraVENous Given 5/26/23 2034)       Discharge Medication List as of 5/26/2023  9:43 PM        START taking these medications    Details   acetaminophen (TYLENOL) 500 MG tablet Take 2 tablets by mouth 3 times daily as needed for Pain, Disp-180 tablet, R-0Normal      aspirin EC 81 MG EC tablet Take 1 tablet by mouth daily for 14 days, Disp-14 tablet, R-0Normal      methocarbamol (ROBAXIN-750) 750 MG tablet Take 1 tablet by mouth 3 times daily as needed (muscle pain/spasm), Disp-30 tablet, R-0Normal             The patient's blood pressure was found to be elevated according to CMS/Medicare and the Affordable Care Act/[facility] criteria. Elevated blood pressure could occur because of pain or anxiety or other reasons and does not mean that they need to have their blood pressure treated or medications otherwise adjusted. However, this could also be a sign that they will need to have their blood pressure treated or medications changed.     The patient was instructed to follow up closely with their personal physician to have their blood

## 2023-05-31 LAB
C TRACH DNA UR QL NAA+PROBE: NEGATIVE
N GONORRHOEA DNA UR QL NAA+PROBE: NEGATIVE

## 2023-09-07 ENCOUNTER — OFFICE VISIT (OUTPATIENT)
Dept: ORTHOPEDIC SURGERY | Age: 63
End: 2023-09-07

## 2023-09-07 VITALS — WEIGHT: 240 LBS | BODY MASS INDEX: 28.34 KG/M2 | HEIGHT: 77 IN | RESPIRATION RATE: 17 BRPM

## 2023-09-07 DIAGNOSIS — S80.01XD CONTUSION OF RIGHT KNEE, SUBSEQUENT ENCOUNTER: ICD-10-CM

## 2023-09-07 DIAGNOSIS — S80.02XD CONTUSION OF LEFT KNEE, SUBSEQUENT ENCOUNTER: Primary | ICD-10-CM

## 2023-09-07 NOTE — PROGRESS NOTES
Subjective:      Patient ID: Jose Simons is a 66 y.o. male who is here for follow up evaluation of his left and right knee BWC claim, bilateral knee contusions. He was last evaluated for bilateral knee pain/contusions on a work comp injury on 7/2/2018. At that time he was placed in an economy hinged knee braces and underwent bilateral knee intra-articular steroid injections. States he has had pain off-and-on since that time. Authorization for additional diagnoses aggravation of a pre-existing condition, knee arthritis has been denied. Currently, C claim for the left and right knee is closed and needs to be reactivated. Review Of Systems:   Constitutional: denies fever, chills, weight loss. MSK: denies pain in other joints, muscle aches. Neurological: denies numbness, tingling, weakness.        Past Medical History:   Diagnosis Date    Anxiety     Gout     Hyperlipidemia     Hypertension     Knee pain        Family History   Problem Relation Age of Onset    Cancer Mother     Diabetes Mother     Heart Failure Mother     Diabetes Father     Heart Disease Father        Past Surgical History:   Procedure Laterality Date    FINGER FRACTURE SURGERY Left     2 MIDDLE FINGERS    KNEE ARTHROSCOPY  right knee    SHOULDER ARTHROSCOPY Right 11/6/2020    RIGHT SHOULDER ARTHROSCOPY, SUBACROMIAL DECOMPRESSION, ROTATOR CUFF REPAIR AND BICEPS TENDONISIS performed by Annalise Arias MD at [address] [place] [address] History    Not on file   Tobacco Use    Smoking status: Some Days     Packs/day: 0.25     Years: 10.00     Pack years: 2.50     Types: Cigarettes    Smokeless tobacco: Never   Vaping Use    Vaping Use: Never used   Substance and Sexual Activity    Alcohol use: Yes     Comment: daliy    Drug use: Never    Sexual activity: Not Currently       Current Outpatient Medications   Medication Sig Dispense Refill    acetaminophen (TYLENOL) 500 MG tablet Take 2 tablets by mouth 3 times daily as

## 2023-10-17 ENCOUNTER — TELEPHONE (OUTPATIENT)
Dept: ORTHOPEDIC SURGERY | Age: 63
End: 2023-10-17

## 2023-10-17 NOTE — TELEPHONE ENCOUNTER
Received request from juan luis Hsieh and Bettina Silverman for a narrative report in regards to denial of  claim reactivation.

## 2023-11-09 ENCOUNTER — OFFICE VISIT (OUTPATIENT)
Dept: PRIMARY CARE CLINIC | Age: 63
End: 2023-11-09
Payer: MEDICAID

## 2023-11-09 VITALS
WEIGHT: 249 LBS | RESPIRATION RATE: 16 BRPM | BODY MASS INDEX: 29.53 KG/M2 | OXYGEN SATURATION: 96 % | HEART RATE: 66 BPM | DIASTOLIC BLOOD PRESSURE: 85 MMHG | SYSTOLIC BLOOD PRESSURE: 133 MMHG

## 2023-11-09 DIAGNOSIS — E78.00 PURE HYPERCHOLESTEROLEMIA: ICD-10-CM

## 2023-11-09 DIAGNOSIS — E78.00 HYPERCHOLESTEROLEMIA: ICD-10-CM

## 2023-11-09 DIAGNOSIS — F41.1 GENERALIZED ANXIETY DISORDER: ICD-10-CM

## 2023-11-09 DIAGNOSIS — Z11.3 SCREEN FOR STD (SEXUALLY TRANSMITTED DISEASE): ICD-10-CM

## 2023-11-09 DIAGNOSIS — F10.90 ALCOHOL USE DISORDER: ICD-10-CM

## 2023-11-09 DIAGNOSIS — Z12.11 COLON CANCER SCREENING: ICD-10-CM

## 2023-11-09 DIAGNOSIS — N52.9 ERECTILE DYSFUNCTION, UNSPECIFIED ERECTILE DYSFUNCTION TYPE: ICD-10-CM

## 2023-11-09 DIAGNOSIS — M1A.0790 CHRONIC IDIOPATHIC GOUT INVOLVING TOE, UNSPECIFIED LATERALITY: ICD-10-CM

## 2023-11-09 DIAGNOSIS — I10 ESSENTIAL HYPERTENSION: Primary | ICD-10-CM

## 2023-11-09 DIAGNOSIS — Z23 NEED FOR INFLUENZA VACCINATION: ICD-10-CM

## 2023-11-09 LAB
ALBUMIN SERPL-MCNC: 4.5 G/DL (ref 3.4–5)
ALBUMIN/GLOB SERPL: 1.6 {RATIO} (ref 1.1–2.2)
ALP SERPL-CCNC: 86 U/L (ref 40–129)
ALT SERPL-CCNC: 105 U/L (ref 10–40)
ANION GAP SERPL CALCULATED.3IONS-SCNC: 9 MMOL/L (ref 3–16)
AST SERPL-CCNC: 126 U/L (ref 15–37)
BASOPHILS # BLD: 0 K/UL (ref 0–0.2)
BASOPHILS NFR BLD: 0.7 %
BILIRUB SERPL-MCNC: 0.5 MG/DL (ref 0–1)
BUN SERPL-MCNC: 8 MG/DL (ref 7–20)
CALCIUM SERPL-MCNC: 9.5 MG/DL (ref 8.3–10.6)
CHLORIDE SERPL-SCNC: 106 MMOL/L (ref 99–110)
CHOLEST SERPL-MCNC: 157 MG/DL (ref 0–199)
CO2 SERPL-SCNC: 27 MMOL/L (ref 21–32)
CREAT SERPL-MCNC: 0.8 MG/DL (ref 0.8–1.3)
DEPRECATED RDW RBC AUTO: 14.5 % (ref 12.4–15.4)
EOSINOPHIL # BLD: 0 K/UL (ref 0–0.6)
EOSINOPHIL NFR BLD: 0.9 %
GFR SERPLBLD CREATININE-BSD FMLA CKD-EPI: >60 ML/MIN/{1.73_M2}
GLUCOSE SERPL-MCNC: 95 MG/DL (ref 70–99)
HCT VFR BLD AUTO: 44 % (ref 40.5–52.5)
HDLC SERPL-MCNC: 80 MG/DL (ref 40–60)
HGB BLD-MCNC: 14.9 G/DL (ref 13.5–17.5)
LDLC SERPL CALC-MCNC: 45 MG/DL
LYMPHOCYTES # BLD: 2 K/UL (ref 1–5.1)
LYMPHOCYTES NFR BLD: 41.8 %
MCH RBC QN AUTO: 31.6 PG (ref 26–34)
MCHC RBC AUTO-ENTMCNC: 33.9 G/DL (ref 31–36)
MCV RBC AUTO: 93.1 FL (ref 80–100)
MONOCYTES # BLD: 0.5 K/UL (ref 0–1.3)
MONOCYTES NFR BLD: 9.9 %
NEUTROPHILS # BLD: 2.2 K/UL (ref 1.7–7.7)
NEUTROPHILS NFR BLD: 46.7 %
PLATELET # BLD AUTO: 194 K/UL (ref 135–450)
PMV BLD AUTO: 8.4 FL (ref 5–10.5)
POTASSIUM SERPL-SCNC: 4.2 MMOL/L (ref 3.5–5.1)
PROT SERPL-MCNC: 7.3 G/DL (ref 6.4–8.2)
RBC # BLD AUTO: 4.73 M/UL (ref 4.2–5.9)
SODIUM SERPL-SCNC: 142 MMOL/L (ref 136–145)
SPECIMEN TYPE: ABNORMAL
TRICHOMONAS VAGINALIS SCREEN: POSITIVE
TRIGL SERPL-MCNC: 158 MG/DL (ref 0–150)
VLDLC SERPL CALC-MCNC: 32 MG/DL
WBC # BLD AUTO: 4.8 K/UL (ref 4–11)

## 2023-11-09 PROCEDURE — 90677 PCV20 VACCINE IM: CPT | Performed by: FAMILY MEDICINE

## 2023-11-09 PROCEDURE — 99214 OFFICE O/P EST MOD 30 MIN: CPT | Performed by: FAMILY MEDICINE

## 2023-11-09 PROCEDURE — 3074F SYST BP LT 130 MM HG: CPT | Performed by: FAMILY MEDICINE

## 2023-11-09 PROCEDURE — 90472 IMMUNIZATION ADMIN EACH ADD: CPT | Performed by: FAMILY MEDICINE

## 2023-11-09 PROCEDURE — 90471 IMMUNIZATION ADMIN: CPT | Performed by: FAMILY MEDICINE

## 2023-11-09 PROCEDURE — 90674 CCIIV4 VAC NO PRSV 0.5 ML IM: CPT | Performed by: FAMILY MEDICINE

## 2023-11-09 PROCEDURE — 3078F DIAST BP <80 MM HG: CPT | Performed by: FAMILY MEDICINE

## 2023-11-09 RX ORDER — TADALAFIL 5 MG/1
5 TABLET ORAL DAILY PRN
Qty: 90 TABLET | Refills: 1 | Status: SHIPPED | OUTPATIENT
Start: 2023-11-09

## 2023-11-09 RX ORDER — BISOPROLOL FUMARATE AND HYDROCHLOROTHIAZIDE 10; 6.25 MG/1; MG/1
1 TABLET ORAL 2 TIMES DAILY
Qty: 180 TABLET | Refills: 1 | Status: SHIPPED | OUTPATIENT
Start: 2023-11-09

## 2023-11-09 RX ORDER — LORATADINE 10 MG/1
10 TABLET ORAL DAILY
Qty: 90 TABLET | Refills: 1 | Status: SHIPPED | OUTPATIENT
Start: 2023-11-09

## 2023-11-09 RX ORDER — COLCHICINE 0.6 MG/1
0.6 TABLET ORAL DAILY
Qty: 90 TABLET | Refills: 1 | Status: CANCELLED | OUTPATIENT
Start: 2023-11-09

## 2023-11-09 RX ORDER — ATORVASTATIN CALCIUM 80 MG/1
80 TABLET, FILM COATED ORAL DAILY
Qty: 90 TABLET | Refills: 1 | Status: SHIPPED | OUTPATIENT
Start: 2023-11-09

## 2023-11-09 RX ORDER — SERTRALINE HYDROCHLORIDE 100 MG/1
100 TABLET, FILM COATED ORAL DAILY
Qty: 90 TABLET | Refills: 1 | Status: SHIPPED | OUTPATIENT
Start: 2023-11-09

## 2023-11-09 RX ORDER — ALLOPURINOL 300 MG/1
300 TABLET ORAL DAILY
Qty: 90 TABLET | Refills: 1 | Status: SHIPPED | OUTPATIENT
Start: 2023-11-09

## 2023-11-09 RX ORDER — LORAZEPAM 1 MG/1
1 TABLET ORAL EVERY 6 HOURS PRN
COMMUNITY
End: 2023-12-09 | Stop reason: ALTCHOICE

## 2023-11-09 ASSESSMENT — PATIENT HEALTH QUESTIONNAIRE - PHQ9
SUM OF ALL RESPONSES TO PHQ QUESTIONS 1-9: 2
SUM OF ALL RESPONSES TO PHQ9 QUESTIONS 1 & 2: 2
2. FEELING DOWN, DEPRESSED OR HOPELESS: 1
SUM OF ALL RESPONSES TO PHQ QUESTIONS 1-9: 2
1. LITTLE INTEREST OR PLEASURE IN DOING THINGS: 1
SUM OF ALL RESPONSES TO PHQ QUESTIONS 1-9: 2
SUM OF ALL RESPONSES TO PHQ QUESTIONS 1-9: 2

## 2023-11-09 NOTE — PROGRESS NOTES
2023    Vitals:    23 1123   BP: 133/85   Pulse: 66   Resp: 16   SpO2: 96%   Weight: 112.9 kg (249 lb)         Crescencio Valencia Sr. (:  1960) is a 66 y.o. male, Established patient, here for evaluation of the following:    Chief complaint(s):  Established New Doctor      This documentation utilizes problem-oriented charting. Problems discussed today, Assessment, and Plan are listed first, followed by historical information reviewed, exam/objective data reviewed, and review of assessment/orders and follow-up. PROBLEMS DISCUSSED TODAY, ASSESSMENT, & PLAN    1. Essential hypertension  Overview:  HTN:  BP Readings from Last 3 Encounters:   23 133/85   23 (!) 153/81   23 136/84     Current Medications/Doses for HTN: bisoprolo-HCTZ 10-6.25 BID  Adherence/Took BP meds today?: yes  Home BP monitoring: no  Pt at Goal BP: borderline, goal <130/80 but pt very anxious today, states he is afraid of doctor's visits  ROS:denies headache, vision changes, lightheadedness, CP/SOB, leg swelling    Annual labs:  Sodium   Date Value Ref Range Status   2023 142 136 - 145 mmol/L Final     Potassium   Date Value Ref Range Status   2023 4.2 3.5 - 5.1 mmol/L Final     Creatinine   Date Value Ref Range Status   2023 0.8 0.8 - 1.3 mg/dL Final     Est, Glom Filt Rate   Date Value Ref Range Status   2023 >60 >60 Final     Comment:     Pediatric calculator link  Mabel.at. org/professionals/kdoqi/gfr_calculatorped  Effective Oct 3, 2022  These results are not intended for use in patients  <25years of age. eGFR results are calculated without  a race factor using the  CKD-EPI equation. Careful  clinical correlation is recommended, particularly when  comparing to results calculated using previous equations.   The CKD-EPI equation is less accurate in patients with  extremes of muscle mass, extra-renal metabolism of  creatinine, excessive creatinine ingestion, or

## 2023-11-10 DIAGNOSIS — R74.01 ELEVATED LIVER TRANSAMINASE LEVEL: ICD-10-CM

## 2023-11-10 DIAGNOSIS — A59.9 TRICHOMONIASIS: ICD-10-CM

## 2023-11-10 DIAGNOSIS — F10.90 ALCOHOL USE DISORDER: Primary | ICD-10-CM

## 2023-11-10 LAB
C TRACH DNA UR QL NAA+PROBE: NEGATIVE
EST. AVERAGE GLUCOSE BLD GHB EST-MCNC: 114 MG/DL
HBA1C MFR BLD: 5.6 %
N GONORRHOEA DNA UR QL NAA+PROBE: NEGATIVE

## 2023-11-10 RX ORDER — METRONIDAZOLE 500 MG/1
TABLET ORAL
Qty: 4 TABLET | Refills: 0 | Status: SHIPPED | OUTPATIENT
Start: 2023-11-10

## 2023-11-11 LAB — RPR SER QL: NORMAL

## 2023-11-12 LAB — MISCELLANEOUS LAB TEST ORDER: NORMAL

## 2023-11-28 ENCOUNTER — TELEPHONE (OUTPATIENT)
Dept: PRIMARY CARE CLINIC | Age: 63
End: 2023-11-28

## 2023-11-28 DIAGNOSIS — A59.9 TRICHOMONAS INFECTION: Primary | ICD-10-CM

## 2023-11-28 NOTE — TELEPHONE ENCOUNTER
Patient walked in he's asking to be retested for Trichomonas pt finished metroNIDAZOLE (FLAGYL) 500 MG tablet  Pt testicles are hurting no frequent urination     Please advise pt 8 [facility] [address], [address] - F [phone]

## 2023-11-29 DIAGNOSIS — R74.01 ELEVATED LIVER TRANSAMINASE LEVEL: ICD-10-CM

## 2023-11-29 DIAGNOSIS — A59.9 TRICHOMONAS INFECTION: ICD-10-CM

## 2023-11-29 LAB
SPECIMEN TYPE: NORMAL
TRICHOMONAS VAGINALIS SCREEN: NEGATIVE

## 2023-11-29 NOTE — TELEPHONE ENCOUNTER
Pt was advise and expressed understanding.   Pt may go to urgent care since he will be going out of town Sunday and will be gone for a while

## 2023-11-30 LAB
HAV IGM SERPL QL IA: NORMAL
HBV CORE IGM SERPL QL IA: NORMAL
HBV SURFACE AB SERPL IA-ACNC: 28.34 MIU/ML
HBV SURFACE AG SERPL QL IA: NORMAL
HCV AB SERPL QL IA: NORMAL

## 2023-12-05 ENCOUNTER — TELEPHONE (OUTPATIENT)
Dept: ORTHOPEDIC SURGERY | Age: 63
End: 2023-12-05

## 2023-12-05 NOTE — TELEPHONE ENCOUNTER
NARRATIVE REQUEST:    COMPANY: [facility]   CONTACTMatthew Mansfield  PH: [phone]  FAX: [phone]    REQUEST SCANNED REGARDING C CLAIM REACTIVATION

## 2023-12-09 PROBLEM — F10.90 ALCOHOL USE DISORDER: Status: ACTIVE | Noted: 2023-12-09

## 2023-12-09 PROBLEM — S46.011A ROTATOR CUFF STRAIN, RIGHT, INITIAL ENCOUNTER: Status: RESOLVED | Noted: 2020-03-19 | Resolved: 2023-12-09

## 2023-12-09 PROBLEM — E78.00 HYPERCHOLESTEROLEMIA: Status: ACTIVE | Noted: 2023-12-09

## 2023-12-09 PROBLEM — M1A.0790: Status: ACTIVE | Noted: 2023-12-09

## 2023-12-09 PROBLEM — M25.511 ACUTE PAIN OF RIGHT SHOULDER: Status: RESOLVED | Noted: 2020-03-19 | Resolved: 2023-12-09

## 2023-12-09 PROBLEM — F41.1 GENERALIZED ANXIETY DISORDER: Status: ACTIVE | Noted: 2023-12-09

## 2023-12-09 PROBLEM — S80.01XA CONTUSION OF RIGHT KNEE: Status: RESOLVED | Noted: 2018-06-23 | Resolved: 2023-12-09

## 2023-12-09 PROBLEM — S80.02XA CONTUSION OF LEFT KNEE: Status: RESOLVED | Noted: 2018-06-23 | Resolved: 2023-12-09

## 2023-12-09 RX ORDER — ASPIRIN 81 MG/1
81 TABLET ORAL DAILY
Qty: 90 TABLET | Refills: 1 | Status: SHIPPED | OUTPATIENT
Start: 2023-12-09

## 2023-12-09 ASSESSMENT — ENCOUNTER SYMPTOMS
COUGH: 0
SHORTNESS OF BREATH: 0

## 2023-12-09 NOTE — ASSESSMENT & PLAN NOTE
Unstable  -Refill sertraline 100mg daily  -Cannot refill Ativan due to risk of OD with alcohol use, discussed risk with patient  -Encouraged therapy, pt states he is going on trip to the [facility] so is not interested at this time  -Encouraged Alcohol cessation

## 2023-12-09 NOTE — ASSESSMENT & PLAN NOTE
Borderline: BP near goal <130/80, but pt very anxious during appt  -Continue current regimen  -Encouraged smoking and alcohol cessation however pt precontemplative  -Due for labs to check renal function, electrolytes, ordered, advised pt to go to lab to complete today/ASAP, discussed need for lab monitoring for patient's safety and safe medication prescribing, pt agreeable and states they will complete today

## 2023-12-09 NOTE — ASSESSMENT & PLAN NOTE
Unstable  Pt precontemplative.  Reviewed risks of alcohol use and sx and risks of alcohol withdrawal including seizure and death, encouraged cessation and for pt to seek care if withdrawing  Check LFTs

## 2024-02-09 ENCOUNTER — OFFICE VISIT (OUTPATIENT)
Dept: PRIMARY CARE CLINIC | Age: 64
End: 2024-02-09
Payer: MEDICAID

## 2024-02-09 VITALS
RESPIRATION RATE: 16 BRPM | WEIGHT: 246.4 LBS | HEART RATE: 62 BPM | HEIGHT: 77 IN | SYSTOLIC BLOOD PRESSURE: 136 MMHG | DIASTOLIC BLOOD PRESSURE: 86 MMHG | TEMPERATURE: 97.6 F | BODY MASS INDEX: 29.09 KG/M2 | OXYGEN SATURATION: 98 %

## 2024-02-09 DIAGNOSIS — F43.10 PTSD (POST-TRAUMATIC STRESS DISORDER): Primary | ICD-10-CM

## 2024-02-09 DIAGNOSIS — F10.90 ALCOHOL USE DISORDER: ICD-10-CM

## 2024-02-09 DIAGNOSIS — B35.1 ONYCHOMYCOSIS: ICD-10-CM

## 2024-02-09 DIAGNOSIS — N52.9 VASCULOGENIC ERECTILE DYSFUNCTION, UNSPECIFIED VASCULOGENIC ERECTILE DYSFUNCTION TYPE: ICD-10-CM

## 2024-02-09 DIAGNOSIS — F41.1 GENERALIZED ANXIETY DISORDER: ICD-10-CM

## 2024-02-09 DIAGNOSIS — I10 ESSENTIAL HYPERTENSION: ICD-10-CM

## 2024-02-09 DIAGNOSIS — M10.00 IDIOPATHIC GOUT, UNSPECIFIED CHRONICITY, UNSPECIFIED SITE: ICD-10-CM

## 2024-02-09 DIAGNOSIS — Z12.5 PROSTATE CANCER SCREENING: ICD-10-CM

## 2024-02-09 DIAGNOSIS — G43.109 MIGRAINE WITH AURA AND WITHOUT STATUS MIGRAINOSUS, NOT INTRACTABLE: ICD-10-CM

## 2024-02-09 DIAGNOSIS — E78.00 HYPERCHOLESTEROLEMIA: ICD-10-CM

## 2024-02-09 DIAGNOSIS — R79.89 ELEVATED LFTS: ICD-10-CM

## 2024-02-09 PROCEDURE — 3079F DIAST BP 80-89 MM HG: CPT | Performed by: FAMILY MEDICINE

## 2024-02-09 PROCEDURE — 99214 OFFICE O/P EST MOD 30 MIN: CPT | Performed by: FAMILY MEDICINE

## 2024-02-09 PROCEDURE — 3075F SYST BP GE 130 - 139MM HG: CPT | Performed by: FAMILY MEDICINE

## 2024-02-09 RX ORDER — COLCHICINE 0.6 MG/1
0.6 TABLET ORAL 2 TIMES DAILY
Qty: 60 TABLET | Refills: 2 | Status: CANCELLED | OUTPATIENT
Start: 2024-02-09 | End: 2025-02-08

## 2024-02-09 RX ORDER — SERTRALINE HYDROCHLORIDE 100 MG/1
100 TABLET, FILM COATED ORAL DAILY
Qty: 90 TABLET | Refills: 1 | Status: SHIPPED | OUTPATIENT
Start: 2024-02-09

## 2024-02-09 RX ORDER — CICLOPIROX 80 MG/ML
SOLUTION TOPICAL
Qty: 6 ML | Refills: 1 | Status: SHIPPED | OUTPATIENT
Start: 2024-02-09

## 2024-02-09 RX ORDER — BLOOD PRESSURE TEST KIT
1-2 KIT MISCELLANEOUS WEEKLY
Qty: 70 EACH | Refills: 1 | Status: SHIPPED | OUTPATIENT
Start: 2024-02-09

## 2024-02-09 RX ORDER — COLCHICINE 0.6 MG/1
TABLET ORAL
Qty: 11 TABLET | Refills: 2 | Status: SHIPPED | OUTPATIENT
Start: 2024-02-09

## 2024-02-09 RX ORDER — AMITRIPTYLINE HYDROCHLORIDE 10 MG/1
10 TABLET, FILM COATED ORAL NIGHTLY
Qty: 90 TABLET | Refills: 1 | Status: SHIPPED | OUTPATIENT
Start: 2024-02-09

## 2024-02-09 SDOH — ECONOMIC STABILITY: HOUSING INSECURITY
IN THE LAST 12 MONTHS, WAS THERE A TIME WHEN YOU DID NOT HAVE A STEADY PLACE TO SLEEP OR SLEPT IN A SHELTER (INCLUDING NOW)?: NO

## 2024-02-09 SDOH — ECONOMIC STABILITY: FOOD INSECURITY: WITHIN THE PAST 12 MONTHS, YOU WORRIED THAT YOUR FOOD WOULD RUN OUT BEFORE YOU GOT MONEY TO BUY MORE.: NEVER TRUE

## 2024-02-09 SDOH — ECONOMIC STABILITY: INCOME INSECURITY: HOW HARD IS IT FOR YOU TO PAY FOR THE VERY BASICS LIKE FOOD, HOUSING, MEDICAL CARE, AND HEATING?: NOT HARD AT ALL

## 2024-02-09 SDOH — ECONOMIC STABILITY: FOOD INSECURITY: WITHIN THE PAST 12 MONTHS, THE FOOD YOU BOUGHT JUST DIDN'T LAST AND YOU DIDN'T HAVE MONEY TO GET MORE.: NEVER TRUE

## 2024-02-09 ASSESSMENT — PATIENT HEALTH QUESTIONNAIRE - PHQ9
SUM OF ALL RESPONSES TO PHQ QUESTIONS 1-9: 20
SUM OF ALL RESPONSES TO PHQ QUESTIONS 1-9: 21
SUM OF ALL RESPONSES TO PHQ QUESTIONS 1-9: 21
7. TROUBLE CONCENTRATING ON THINGS, SUCH AS READING THE NEWSPAPER OR WATCHING TELEVISION: 3
5. POOR APPETITE OR OVEREATING: 2
SUM OF ALL RESPONSES TO PHQ9 QUESTIONS 1 & 2: 4
6. FEELING BAD ABOUT YOURSELF - OR THAT YOU ARE A FAILURE OR HAVE LET YOURSELF OR YOUR FAMILY DOWN: 3
2. FEELING DOWN, DEPRESSED OR HOPELESS: 2
3. TROUBLE FALLING OR STAYING ASLEEP: 2
9. THOUGHTS THAT YOU WOULD BE BETTER OFF DEAD, OR OF HURTING YOURSELF: 1
10. IF YOU CHECKED OFF ANY PROBLEMS, HOW DIFFICULT HAVE THESE PROBLEMS MADE IT FOR YOU TO DO YOUR WORK, TAKE CARE OF THINGS AT HOME, OR GET ALONG WITH OTHER PEOPLE: 2
SUM OF ALL RESPONSES TO PHQ QUESTIONS 1-9: 21
4. FEELING TIRED OR HAVING LITTLE ENERGY: 3
8. MOVING OR SPEAKING SO SLOWLY THAT OTHER PEOPLE COULD HAVE NOTICED. OR THE OPPOSITE, BEING SO FIGETY OR RESTLESS THAT YOU HAVE BEEN MOVING AROUND A LOT MORE THAN USUAL: 3
1. LITTLE INTEREST OR PLEASURE IN DOING THINGS: 2

## 2024-02-09 ASSESSMENT — COLUMBIA-SUICIDE SEVERITY RATING SCALE - C-SSRS
1. WITHIN THE PAST MONTH, HAVE YOU WISHED YOU WERE DEAD OR WISHED YOU COULD GO TO SLEEP AND NOT WAKE UP?: YES
6. HAVE YOU EVER DONE ANYTHING, STARTED TO DO ANYTHING, OR PREPARED TO DO ANYTHING TO END YOUR LIFE?: NO
2. HAVE YOU ACTUALLY HAD ANY THOUGHTS OF KILLING YOURSELF?: NO
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
## 2024-02-09 NOTE — PATIENT INSTRUCTIONS
Get labs done today    Call [phone] to schedule your liver ultrasound.    START amitriptyline 10mg daily for migraines  Continue sertraline 100mg daily    Start Penlac for your toe nail fungus, apply once daily, remove weekly with alcohal swab, then continue until nail fungus improves    [facility] and [facility] treatment

## 2024-02-16 DIAGNOSIS — Z12.5 PROSTATE CANCER SCREENING: ICD-10-CM

## 2024-02-16 DIAGNOSIS — R79.89 ELEVATED LFTS: ICD-10-CM

## 2024-02-16 LAB — PSA SERPL DL<=0.01 NG/ML-MCNC: 3.39 NG/ML (ref 0–4)

## 2024-02-17 LAB
ALBUMIN SERPL-MCNC: 4.4 G/DL (ref 3.4–5)
ALP SERPL-CCNC: 87 U/L (ref 40–129)
ALT SERPL-CCNC: 71 U/L (ref 10–40)
AST SERPL-CCNC: 107 U/L (ref 15–37)
BILIRUB DIRECT SERPL-MCNC: <0.2 MG/DL (ref 0–0.3)
BILIRUB INDIRECT SERPL-MCNC: ABNORMAL MG/DL (ref 0–1)
BILIRUB SERPL-MCNC: 0.6 MG/DL (ref 0–1)
PROT SERPL-MCNC: 7.8 G/DL (ref 6.4–8.2)

## 2024-02-28 ENCOUNTER — TELEPHONE (OUTPATIENT)
Dept: PRIMARY CARE CLINIC | Age: 64
End: 2024-02-28

## 2024-02-28 PROBLEM — G43.109 MIGRAINE WITH AURA AND WITHOUT STATUS MIGRAINOSUS, NOT INTRACTABLE: Status: ACTIVE | Noted: 2024-02-28

## 2024-02-28 PROBLEM — F43.10 PTSD (POST-TRAUMATIC STRESS DISORDER): Status: ACTIVE | Noted: 2023-12-09

## 2024-02-28 PROBLEM — M1A.0790: Status: RESOLVED | Noted: 2023-12-09 | Resolved: 2024-02-28

## 2024-02-28 PROBLEM — R79.89 ELEVATED LFTS: Status: ACTIVE | Noted: 2024-02-28

## 2024-02-28 ASSESSMENT — ENCOUNTER SYMPTOMS
COUGH: 0
SHORTNESS OF BREATH: 0

## 2024-02-28 NOTE — TELEPHONE ENCOUNTER
Pt was advised he can get a copy of his AVS from the last visit but the last actual OV note he needs to fill out a release of records at the .    Dr Choi-Please sign OV note from 2/9/2024 so pt' can  a copy  Thank you

## 2024-02-28 NOTE — TELEPHONE ENCOUNTER
----- Message from Lelia Moyer MA sent at 2/27/2024 12:42 PM EST -----  Subject: Message to Provider    QUESTIONS  Information for Provider? Pt called and stated that he would like to come   in and  a copy of his last summary sheet from his last visit.   Please call the pt back and inform once he is able to .   ---------------------------------------------------------------------------  --------------  CALL BACK INFO  [phone]; OK to leave message on voicemail  ---------------------------------------------------------------------------  --------------  SCRIPT ANSWERS  Relationship to Patient? Self

## 2024-02-29 NOTE — ASSESSMENT & PLAN NOTE
Stable: likely multifactorial r/t alcohol use, HTN, HLD, sertraline. Testosterone 2013 low-nl  -Check PSA  -Refilled tadalafil last visit  -Again advised alcohol cessation

## 2024-02-29 NOTE — ASSESSMENT & PLAN NOTE
Unstable: PTSD + likely EDY  -Continue sertraline 100mg daily  -START amitriptyline for augmentation and migraine ppx, see separate problem

## 2024-02-29 NOTE — ASSESSMENT & PLAN NOTE
Stable: likely r/t alcohol use  -Repeat today  -Advised pt to get liver u/s as prev ordered, reminded pt and provided phone number

## 2024-02-29 NOTE — ASSESSMENT & PLAN NOTE
Unstable: no new sx/characteristics recently and neuro exam reassuring today, but having migraines 4-5x/month, may be worse if untx sleep apnea.   -Pt already on Bblocker  -START amitriptyline 10mg daily  -[facility] and tx of sleep apnea  -Prev CT head neg, consider MRI pending response to above  -Consider TTE if c/f possible TIA- discuss with patient next visit  -Consider neuro ref pending response to above

## 2024-02-29 NOTE — ASSESSMENT & PLAN NOTE
Borderline: BP near goal <130/80, but pt very anxious during appt  -Continue bisoprolol-HCTZ 10-6.25mg BID  -Consider changing antihypertensive regimen from bisoprolol-HCTZ to alt agent in future if increased freq of flares, as these agents can increase serum urate, however BP currently well controlled on this regimen and bisoprolol may be helpful for migraines (see separate problem)  -Encouraged smoking and alcohol cessation  -[facility] for results of sleep study and tx of sleep apnea

## 2024-02-29 NOTE — ASSESSMENT & PLAN NOTE
Stable: likely worsened/triggered by alcohol use and may be worsened by bisoprolol-HCTZ  -Cont allopurinol  -LFTs chronically mildly elevated but stable, likely r/t alcohol use, see separate problem  -Renal function wnl 11/2023  -Refill colchicine for flares only, advised to take at flare onset, clarified dosing  -Encouraged alcohol cessation  -Consider changing antihypertensive regimen from bisoprolol-HCTZ to alt agent in future if increased freq of flares, as these agents can increase serum urate, however BP currently well controlled on this regimen and bisoprolol may be helpful for migraines (see separate problem)

## 2024-02-29 NOTE — ASSESSMENT & PLAN NOTE
Stable  -Reviewed risks of alcohol use and sx and risks of alcohol withdrawal including seizure and death, encouraged cessation and for pt to seek care if withdrawing  -LFTs elevated- repeat today, check liver u/s, discussed with patient

## 2024-03-13 ENCOUNTER — HOSPITAL ENCOUNTER (OUTPATIENT)
Dept: ULTRASOUND IMAGING | Age: 64
Discharge: HOME | End: 2024-03-13
Attending: FAMILY MEDICINE
Payer: MEDICAID

## 2024-03-13 DIAGNOSIS — F10.90 ALCOHOL USE DISORDER: ICD-10-CM

## 2024-03-13 DIAGNOSIS — R74.01 ELEVATED LIVER TRANSAMINASE LEVEL: ICD-10-CM

## 2024-03-13 PROCEDURE — 76705 ECHO EXAM OF ABDOMEN: CPT

## 2024-03-13 PROCEDURE — 76981 USE PARENCHYMA: CPT

## 2024-04-18 DIAGNOSIS — B35.1 ONYCHOMYCOSIS: ICD-10-CM

## 2024-04-19 RX ORDER — CICLOPIROX 80 MG/ML
SOLUTION TOPICAL
Qty: 6 ML | Refills: 1 | Status: SHIPPED | OUTPATIENT
Start: 2024-04-19

## 2024-05-04 NOTE — TELEPHONE ENCOUNTER
Received PA request for Atorvastatin Calcium 80MG tablets    Per telephone encounter 2/28/2022    \"HE HAS TO PAY OUT OF POCKET FOR HIS NON  WORKERS COMP MEDS, HE IS AWARE OF THIS     NO ACTION NEEDED Dr. Bey

## 2024-05-09 ENCOUNTER — OFFICE VISIT (OUTPATIENT)
Dept: PRIMARY CARE CLINIC | Age: 64
End: 2024-05-09
Payer: MEDICAID

## 2024-05-09 VITALS
WEIGHT: 238.6 LBS | SYSTOLIC BLOOD PRESSURE: 126 MMHG | HEART RATE: 70 BPM | BODY MASS INDEX: 28.17 KG/M2 | DIASTOLIC BLOOD PRESSURE: 78 MMHG | HEIGHT: 77 IN | OXYGEN SATURATION: 98 % | TEMPERATURE: 97.3 F

## 2024-05-09 DIAGNOSIS — G43.109 MIGRAINE WITH AURA AND WITHOUT STATUS MIGRAINOSUS, NOT INTRACTABLE: ICD-10-CM

## 2024-05-09 DIAGNOSIS — M1A.0710 CHRONIC IDIOPATHIC GOUT INVOLVING TOE OF RIGHT FOOT WITHOUT TOPHUS: ICD-10-CM

## 2024-05-09 DIAGNOSIS — F43.10 PTSD (POST-TRAUMATIC STRESS DISORDER): Primary | ICD-10-CM

## 2024-05-09 DIAGNOSIS — E78.00 HYPERCHOLESTEROLEMIA: ICD-10-CM

## 2024-05-09 DIAGNOSIS — G47.33 OSA (OBSTRUCTIVE SLEEP APNEA): ICD-10-CM

## 2024-05-09 DIAGNOSIS — Z12.5 SCREENING FOR PROSTATE CANCER: ICD-10-CM

## 2024-05-09 DIAGNOSIS — I10 ESSENTIAL HYPERTENSION: ICD-10-CM

## 2024-05-09 DIAGNOSIS — N52.8 OTHER MALE ERECTILE DYSFUNCTION: ICD-10-CM

## 2024-05-09 DIAGNOSIS — F10.90 ALCOHOL USE DISORDER: ICD-10-CM

## 2024-05-09 LAB
ALBUMIN SERPL-MCNC: 4.1 G/DL (ref 3.4–5)
ALBUMIN/GLOB SERPL: 1.4 {RATIO} (ref 1.1–2.2)
ALP SERPL-CCNC: 90 U/L (ref 40–129)
ALT SERPL-CCNC: 48 U/L (ref 10–40)
ANION GAP SERPL CALCULATED.3IONS-SCNC: 15 MMOL/L (ref 3–16)
AST SERPL-CCNC: 118 U/L (ref 15–37)
BILIRUB SERPL-MCNC: 0.7 MG/DL (ref 0–1)
BUN SERPL-MCNC: 5 MG/DL (ref 7–20)
CALCIUM SERPL-MCNC: 9.8 MG/DL (ref 8.3–10.6)
CHLORIDE SERPL-SCNC: 97 MMOL/L (ref 99–110)
CO2 SERPL-SCNC: 28 MMOL/L (ref 21–32)
CREAT SERPL-MCNC: 0.7 MG/DL (ref 0.8–1.3)
GFR SERPLBLD CREATININE-BSD FMLA CKD-EPI: >90 ML/MIN/{1.73_M2}
GLUCOSE SERPL-MCNC: 101 MG/DL (ref 70–99)
POTASSIUM SERPL-SCNC: 3.3 MMOL/L (ref 3.5–5.1)
PROT SERPL-MCNC: 7.1 G/DL (ref 6.4–8.2)
PSA SERPL DL<=0.01 NG/ML-MCNC: 3.33 NG/ML (ref 0–4)
SODIUM SERPL-SCNC: 140 MMOL/L (ref 136–145)
TSH SERPL DL<=0.005 MIU/L-ACNC: 1.81 UIU/ML (ref 0.27–4.2)

## 2024-05-09 PROCEDURE — 3078F DIAST BP <80 MM HG: CPT | Performed by: FAMILY MEDICINE

## 2024-05-09 PROCEDURE — 3074F SYST BP LT 130 MM HG: CPT | Performed by: FAMILY MEDICINE

## 2024-05-09 PROCEDURE — 99214 OFFICE O/P EST MOD 30 MIN: CPT | Performed by: FAMILY MEDICINE

## 2024-05-09 RX ORDER — ALLOPURINOL 300 MG/1
300 TABLET ORAL DAILY
Qty: 90 TABLET | Refills: 1 | Status: SHIPPED | OUTPATIENT
Start: 2024-05-09

## 2024-05-09 RX ORDER — BISOPROLOL FUMARATE AND HYDROCHLOROTHIAZIDE 10; 6.25 MG/1; MG/1
1 TABLET ORAL 2 TIMES DAILY
Qty: 180 TABLET | Refills: 1 | Status: SHIPPED | OUTPATIENT
Start: 2024-05-09

## 2024-05-09 RX ORDER — ASPIRIN 81 MG/1
81 TABLET ORAL DAILY
Qty: 90 TABLET | Refills: 1 | Status: SHIPPED | OUTPATIENT
Start: 2024-05-09

## 2024-05-09 RX ORDER — ATORVASTATIN CALCIUM 80 MG/1
80 TABLET, FILM COATED ORAL DAILY
Qty: 90 TABLET | Refills: 1 | Status: SHIPPED | OUTPATIENT
Start: 2024-05-09

## 2024-05-09 ASSESSMENT — PATIENT HEALTH QUESTIONNAIRE - PHQ9
9. THOUGHTS THAT YOU WOULD BE BETTER OFF DEAD, OR OF HURTING YOURSELF: SEVERAL DAYS
SUM OF ALL RESPONSES TO PHQ9 QUESTIONS 1 & 2: 2
4. FEELING TIRED OR HAVING LITTLE ENERGY: NEARLY EVERY DAY
SUM OF ALL RESPONSES TO PHQ QUESTIONS 1-9: 16
3. TROUBLE FALLING OR STAYING ASLEEP: NEARLY EVERY DAY
2. FEELING DOWN, DEPRESSED OR HOPELESS: SEVERAL DAYS
5. POOR APPETITE OR OVEREATING: MORE THAN HALF THE DAYS
SUM OF ALL RESPONSES TO PHQ QUESTIONS 1-9: 16
7. TROUBLE CONCENTRATING ON THINGS, SUCH AS READING THE NEWSPAPER OR WATCHING TELEVISION: SEVERAL DAYS
8. MOVING OR SPEAKING SO SLOWLY THAT OTHER PEOPLE COULD HAVE NOTICED. OR THE OPPOSITE, BEING SO FIGETY OR RESTLESS THAT YOU HAVE BEEN MOVING AROUND A LOT MORE THAN USUAL: MORE THAN HALF THE DAYS
SUM OF ALL RESPONSES TO PHQ QUESTIONS 1-9: 16
6. FEELING BAD ABOUT YOURSELF - OR THAT YOU ARE A FAILURE OR HAVE LET YOURSELF OR YOUR FAMILY DOWN: MORE THAN HALF THE DAYS
SUM OF ALL RESPONSES TO PHQ QUESTIONS 1-9: 15
1. LITTLE INTEREST OR PLEASURE IN DOING THINGS: SEVERAL DAYS

## 2024-05-09 ASSESSMENT — ANXIETY QUESTIONNAIRES
GAD7 TOTAL SCORE: 14
1. FEELING NERVOUS, ANXIOUS, OR ON EDGE: MORE THAN HALF THE DAYS
3. WORRYING TOO MUCH ABOUT DIFFERENT THINGS: MORE THAN HALF THE DAYS
6. BECOMING EASILY ANNOYED OR IRRITABLE: MORE THAN HALF THE DAYS
4. TROUBLE RELAXING: MORE THAN HALF THE DAYS
2. NOT BEING ABLE TO STOP OR CONTROL WORRYING: MORE THAN HALF THE DAYS
5. BEING SO RESTLESS THAT IT IS HARD TO SIT STILL: MORE THAN HALF THE DAYS
IF YOU CHECKED OFF ANY PROBLEMS ON THIS QUESTIONNAIRE, HOW DIFFICULT HAVE THESE PROBLEMS MADE IT FOR YOU TO DO YOUR WORK, TAKE CARE OF THINGS AT HOME, OR GET ALONG WITH OTHER PEOPLE: VERY DIFFICULT
7. FEELING AFRAID AS IF SOMETHING AWFUL MIGHT HAPPEN: MORE THAN HALF THE DAYS

## 2024-05-09 ASSESSMENT — COLUMBIA-SUICIDE SEVERITY RATING SCALE - C-SSRS
2. HAVE YOU ACTUALLY HAD ANY THOUGHTS OF KILLING YOURSELF?: NO
1. WITHIN THE PAST MONTH, HAVE YOU WISHED YOU WERE DEAD OR WISHED YOU COULD GO TO SLEEP AND NOT WAKE UP?: YES
6. HAVE YOU EVER DONE ANYTHING, STARTED TO DO ANYTHING, OR PREPARED TO DO ANYTHING TO END YOUR LIFE?: NO

## 2024-05-09 ASSESSMENT — ENCOUNTER SYMPTOMS
SHORTNESS OF BREATH: 0
COUGH: 0

## 2024-05-09 NOTE — ASSESSMENT & PLAN NOTE
Stable: likely multifactorial r/t alcohol use, ALISIA, HTN, HLD, sertraline. Testosterone 2013 low-nl  -Recheck PSA  -Refilled tadalafil prev  -Pt has appt with sleep medicine  -Again advised alcohol cessation

## 2024-05-09 NOTE — ASSESSMENT & PLAN NOTE
Stable  -Reviewed risks of alcohol use and sx and risks of alcohol withdrawal including seizure and death, encouraged cessation and for pt to seek care if withdrawing  -Reviewed liver u/s 3/2024: Moderate hepatic steatosis and mild hepatomegaly.  No findings suggestive  of cirrhosis.

## 2024-05-09 NOTE — ASSESSMENT & PLAN NOTE
Improving: decreased freq/intensity since starting amitriptyline, no new sx/characteristics recently and neuro exam reassuring today, but having migraines 4-5x/month, may be worse if untx sleep apnea.   -Pt already on Bblocker  -Continue amitriptyline 10mg daily  -Check TSH  -Prev CT head neg  -MRI ordered  -Untreated Perico may be contributing, pt has upcoming appt to est with sleep medicine  -Strongly recommended pt estblish with a neurologist, offered referral however pt states he would like to [facility]. Will call [facility] to facilitate pt getting in   -Consider TTE if c/f possible TIA pending neurology recs

## 2024-05-09 NOTE — PATIENT INSTRUCTIONS
Get blood work done    Continue current medications    Follow-up with sleep medicine doctor on 5/23 at 11am at [facility]. Dr. Shelley    Please request to see psychiatrist (mental health) for PTSD and [facility]ist [facility]ines [facility]. If you can't get in with these specialists at the [facility], let me know and I will put in a referral.     [facility] at [facility], [facility], or [facility]- request open MRI for migraines

## 2024-05-09 NOTE — ASSESSMENT & PLAN NOTE
Unstable: PTSD + likely EDY+/-MDD. Today advised increasing his dose of either sertraline or amitriptyline for improved sx control, however pt declines medication changes at this time, states walking more has been helping him. Strongly recommended pt nasra with a psychiatrist, offered referral however pt states he would like to est [facility]  -Continue sertraline 100mg daily  -Continue amitriptyline 10mg QHS for augmentation and migraine ppx, see separate problem  -Strongly recommended pt nasra with a psychiatrist, offered referral however pt states he would like to est with [facility] at the [facility]. Will call [facility] to facilitate pt getting in  -Safety plan: Denies SI/HI currently. States he has been having intermittent thoughts about not wanting to live feeling like this but denies thoughts of, plan or intent to hurt or kill himself at this time. Pt states if he develops SI, he will go for a walk and then it usually goes away. If this doesn't help pt states he will call[facility]go [facility] and [facility] crisis services/admission, states he feels comfortable seeking mental health care through the [facility].  -Called and confirmed with pt's brother, Zachary, he states he has pt's gun in his possession, states he will call to check on pt

## 2024-05-09 NOTE — PROGRESS NOTES
at least 3-4 drinks sometimes more  Denies hx sig withdrawal  Not interested in cessation  Assessment & Plan:  Stable  -Reviewed risks of alcohol use and sx and risks of alcohol withdrawal including seizure and death, encouraged cessation and for pt to seek care if withdrawing  -Reviewed liver u/s 3/2024: Moderate hepatic steatosis and mild hepatomegaly.  No findings suggestive  of cirrhosis.  Orders:  -     Comprehensive Metabolic Panel; Future  8. Other male erectile dysfunction  Overview:  5/2024:  Today patient denies patient denies urinary frequency, hesitancy, dribbling  Patient states if he sits on the \"lump\"/ridge in his car seat too hard it hurts his testicles, but otherwise denies rectal/testicular pain/pressure  Discussed last PSA wnl but increased compared with prior:  Lab Results   Component Value Date    PSA 3.39 02/16/2024    PSA 1.33 07/13/2021    PSA 1.56 04/02/2018 2/2024:  Testicular pressure he had at last visit resolved shortly after, was treated for trich  Discussed at last visit risk of torsion, pt had declined exam at that time  Notes this has all since resolved  Denies Family history of prostate cancer but would like to check PSA  Reports chronic ED, using tadalafil prn, has good effect   Assessment & Plan:   Stable: likely multifactorial r/t alcohol use, ALISIA, HTN, HLD, sertraline. Testosterone 2013 low-nl  -Recheck PSA  -Refilled tadalafil prev  -Pt has appt with sleep medicine  -Again advised alcohol cessation  9. Screening for prostate cancer  Comments:  Discussed last PSA wnl but increased compared with prior, recheck today  Orders:  -     PSA Screening; Future         Health Maintenance:  Health Maintenance Due   Topic Date Due    COVID-19 Vaccine (1) Never done    Colorectal Cancer Screen  Never done    Respiratory Syncytial Virus (RSV) Pregnant or age 60 yrs+ (1 - 1-dose 60+ series) Never done    Shingles vaccine (2 of 2) 04/05/2024        I have reviewed chart notes available

## 2024-05-10 ENCOUNTER — TELEPHONE (OUTPATIENT)
Dept: PRIMARY CARE CLINIC | Age: 64
End: 2024-05-10

## 2024-05-10 DIAGNOSIS — M1A.0710 CHRONIC IDIOPATHIC GOUT INVOLVING TOE OF RIGHT FOOT WITHOUT TOPHUS: ICD-10-CM

## 2024-05-10 DIAGNOSIS — E87.6 HYPOKALEMIA: Primary | ICD-10-CM

## 2024-05-10 LAB — URATE SERPL-MCNC: 12.5 MG/DL (ref 3.5–7.2)

## 2024-05-10 RX ORDER — POTASSIUM CHLORIDE 750 MG/1
10 TABLET, EXTENDED RELEASE ORAL DAILY
Qty: 30 TABLET | Refills: 0 | Status: SHIPPED | OUTPATIENT
Start: 2024-05-10

## 2024-05-10 NOTE — ASSESSMENT & PLAN NOTE
Borderline: BP near goal <130/80, improved on repeat  -Continue bisoprolol-HCTZ 10-6.25mg BID  -Consider changing antihypertensive regimen from bisoprolol-HCTZ to alt agent in future if increased freq of flares, as these agents can increase serum urate, however BP currently well controlled on this regimen and bisoprolol may be helpful for migraines (see separate problem)  -Encouraged smoking and alcohol cessation  -Est with sleep med as scheduled for tx of sleep apnea

## 2024-05-10 NOTE — TELEPHONE ENCOUNTER
[facility] and spoke with Sebastien he stated patient has started intake with  and they will go from there regarding what his needs are for a psychiatrist. Sebastien also stated patient has two appointments next week regarding what the next steps are.      [facility]: Called and spoke with Lisseth and informed her of message she informed me she will place a note in his chart informing the nurse his upcoming appt is  may 16, office phone number was taken down to call us back if any questions or needs general clarity

## 2024-05-10 NOTE — ASSESSMENT & PLAN NOTE
Improved/Stable: likely worsened/triggered by alcohol use and may be worsened by bisoprolol-HCTZ  -Cont allopurinol  -Check uric acid  -LFTs chronically mildly elevated but stable, likely r/t alcohol use, see separate problem  -Renal function wnl 11/2023, repeat today  -Refill colchicine for flares only, advised to take at flare onset, clarified dosing  -Encouraged alcohol cessation  -Consider changing antihypertensive regimen from bisoprolol-HCTZ to alt agent in future if increased freq of flares, as these agents can increase serum urate, however BP currently well controlled on this regimen and bisoprolol may be helpful for migraines (see separate problem)

## 2024-05-10 NOTE — RESULT ENCOUNTER NOTE
Dear Moshe Jesus Sr.,    Your attached lab results show:  -Potassium is slightly below normal, as it has been in the past. This can be caused by your blood pressure medicine. At your next appointment, we should discuss changing your blood pressure medicine to an option that won't lower your potassium and is less likely to trigger your gout. We will discuss this at your next appointment on 6/6.     In the meantime, I would like you to start a low dose potassium supplement. I sent this to your pharmacy, please take this once a day.     Please get your labs done again 1 week after starting this to make sure your potassium returns to normal. This repeat blood work is ordered and you can get it at any [facility] lab, but please get this done in the morning before 10am, as I would also like to check the kidney hormones that control potassium, and this is the best time to check these.    -kidney function looks good  -prostate testing is stable and still normal- this is good news, no further testing necessary until next year  -thyroid testing is normal    Feel free to mychart/call with any questions.    Dr. Choi

## 2024-05-10 NOTE — TELEPHONE ENCOUNTER
Please call the following [facility] numbers and advise (will need last 4 digits of pt's SSN):  A)  [facility]  Phone: [phone]  Please advise:   Pt is , [facility]  I am currently his PCP but he has an upcoming appointment to establish in the [facility] primary [facility] as well  As his current PCP, I recommend pt establish with a psychiatrist for PTSD. Current symptoms are in severe range, though patient said at last appt with me that he was not suicidal or homicidal and patient is taking medications (sertraline and amitriptyline). Please ask if there is a way to get patient scheduled with psychiatry through the [facility].?    B)   [facility]:   Phone: [phone], ext. [phone]   Pt is , [facility]  I am currently his PCP but he has an upcoming appointment to establish in the [facility] as well  As his current PCP, I recommend pt establish with a neurologist for chronic migraines. Patient is taking medications for this (beta blocker and amitriptyline). Please ask if there is a way to get patient scheduled with neurology through the [facility].?    Dr. Choi

## 2024-05-10 NOTE — RESULT ENCOUNTER NOTE
Dear Moshe Jesus Sr.,    Your attached lab results show:  -uric acid is elevated- uric acid can cause gout and is elevated by alcohol use. If you have more gout flares, we may need to discuss adjusting your allopurinol dose. We can discuss this at your next appointment    Dr. Choi.    Feel free to mychart/call with any questions.    Dr. Choi no fever and no chills.

## 2024-05-23 ENCOUNTER — OFFICE VISIT (OUTPATIENT)
Dept: SLEEP MEDICINE | Age: 64
End: 2024-05-23
Payer: MEDICAID

## 2024-05-23 VITALS
DIASTOLIC BLOOD PRESSURE: 64 MMHG | TEMPERATURE: 97.6 F | WEIGHT: 233.4 LBS | OXYGEN SATURATION: 99 % | RESPIRATION RATE: 18 BRPM | HEIGHT: 77 IN | HEART RATE: 78 BPM | SYSTOLIC BLOOD PRESSURE: 122 MMHG | BODY MASS INDEX: 27.56 KG/M2

## 2024-05-23 DIAGNOSIS — I10 ESSENTIAL HYPERTENSION: ICD-10-CM

## 2024-05-23 DIAGNOSIS — G47.33 OSA (OBSTRUCTIVE SLEEP APNEA): Primary | ICD-10-CM

## 2024-05-23 DIAGNOSIS — R06.89 GASPING FOR BREATH: ICD-10-CM

## 2024-05-23 DIAGNOSIS — G47.19 EXCESSIVE DAYTIME SLEEPINESS: ICD-10-CM

## 2024-05-23 DIAGNOSIS — R06.83 SNORING: ICD-10-CM

## 2024-05-23 DIAGNOSIS — N52.9 VASCULOGENIC ERECTILE DYSFUNCTION, UNSPECIFIED VASCULOGENIC ERECTILE DYSFUNCTION TYPE: ICD-10-CM

## 2024-05-23 DIAGNOSIS — R06.81 WITNESSED EPISODE OF APNEA: ICD-10-CM

## 2024-05-23 PROCEDURE — 3078F DIAST BP <80 MM HG: CPT | Performed by: PSYCHIATRY & NEUROLOGY

## 2024-05-23 PROCEDURE — 3074F SYST BP LT 130 MM HG: CPT | Performed by: PSYCHIATRY & NEUROLOGY

## 2024-05-23 PROCEDURE — 99204 OFFICE O/P NEW MOD 45 MIN: CPT | Performed by: PSYCHIATRY & NEUROLOGY

## 2024-05-23 ASSESSMENT — SLEEP AND FATIGUE QUESTIONNAIRES
HOW LIKELY ARE YOU TO NOD OFF OR FALL ASLEEP IN A CAR, WHILE STOPPED FOR A FEW MINUTES IN TRAFFIC: WOULD NEVER DOZE
ESS TOTAL SCORE: 11
HOW LIKELY ARE YOU TO NOD OFF OR FALL ASLEEP WHILE SITTING INACTIVE IN A PUBLIC PLACE: SLIGHT CHANCE OF DOZING
HOW LIKELY ARE YOU TO NOD OFF OR FALL ASLEEP WHILE SITTING AND READING: SLIGHT CHANCE OF DOZING
HOW LIKELY ARE YOU TO NOD OFF OR FALL ASLEEP WHILE SITTING AND TALKING TO SOMEONE: SLIGHT CHANCE OF DOZING
HOW LIKELY ARE YOU TO NOD OFF OR FALL ASLEEP WHILE WATCHING TV: HIGH CHANCE OF DOZING
HOW LIKELY ARE YOU TO NOD OFF OR FALL ASLEEP WHILE LYING DOWN TO REST IN THE AFTERNOON WHEN CIRCUMSTANCES PERMIT: MODERATE CHANCE OF DOZING
HOW LIKELY ARE YOU TO NOD OFF OR FALL ASLEEP WHEN YOU ARE A PASSENGER IN A CAR FOR AN HOUR WITHOUT A BREAK: SLIGHT CHANCE OF DOZING
HOW LIKELY ARE YOU TO NOD OFF OR FALL ASLEEP WHILE SITTING QUIETLY AFTER LUNCH WITHOUT ALCOHOL: MODERATE CHANCE OF DOZING
NECK CIRCUMFERENCE (INCHES): 19

## 2024-05-23 ASSESSMENT — ENCOUNTER SYMPTOMS
GASTROINTESTINAL NEGATIVE: 1
APNEA: 1
CHOKING: 1

## 2024-05-23 NOTE — PATIENT INSTRUCTIONS
Orders Placed This Encounter   Procedures    Baseline Diagnostic Sleep Study     Standing Status:   Future     Standing Expiration Date:   5/23/2025     Order Specific Question:   Adult or Pediatric     Answer:   Adult Study (>7 Years)     Order Specific Question:   Location For Sleep Study     Answer:   [place]     Order Specific Question:   Select Sleep Lab Location     Answer:   [facility]    Sleep Study with PAP Titration     Standing Status:   Future     Standing Expiration Date:   5/23/2025     Order Specific Question:   Sleep Study Titration Type     Answer:   CPAP     Order Specific Question:   Location For Sleep Study     Answer:   [place]     Order Specific Question:   Select Sleep Lab Location     Answer:   [facility]

## 2024-05-23 NOTE — PROGRESS NOTES
Assessment:    Obstructive sleep apnea especially with snoring, snorting,  observed apnea, daytime sleepiness,  and obesity.             Diagnosis Orders   1. ALISIA (obstructive sleep apnea)        2. Essential hypertension        3. Vasculogenic erectile dysfunction, unspecified vasculogenic erectile dysfunction type          Plan:     Patient was counseled about the pathophysiology of obstructive sleep apnea syndrome and the methods for evaluating its presence and severity.  Patient was counseled to avoid driving and other potentially hazardous circumstances if the patient is experiencing excessive sleepiness.  Treatment considerations include the use of nasal CPAP, inspire, oral dental appliance or a surgical intervention, which should be based on otolarygologic findings, In the meantime, the patient should be cautioned to avoid the use of alcohol or other depressant medications because of potential for increasing the duration and severity of apnea and cautioned regarding driving or operating and dangerous equipment if the patient is experiencing daytime sleepiness..  Most likely treating the ALISIA will have positive impact on HTN, and ED control.       No orders of the defined types were placed in this encounter.      No follow-ups on file.    Zuleyma Shelley MD  Medical Director - [facility]

## 2024-05-30 ENCOUNTER — HOSPITAL ENCOUNTER (OUTPATIENT)
Dept: SLEEP CENTER | Age: 64
Discharge: HOME | End: 2024-05-30
Attending: PSYCHIATRY & NEUROLOGY
Payer: MEDICAID

## 2024-05-30 DIAGNOSIS — G47.33 OSA (OBSTRUCTIVE SLEEP APNEA): ICD-10-CM

## 2024-05-30 PROCEDURE — 95810 POLYSOM 6/> YRS 4/> PARAM: CPT

## 2024-06-03 ENCOUNTER — TELEPHONE (OUTPATIENT)
Dept: PRIMARY CARE CLINIC | Age: 64
End: 2024-06-03

## 2024-06-03 NOTE — TELEPHONE ENCOUNTER
Please call [facility] and request records be faxed from any recent appts for this patient prior to his scheduled visit with me on Thursday. Please place records on my desk once received. Thank you.    Daisy Choi MD  Family Medicine, [facility]  Psychiatry, [facility]

## 2024-06-03 NOTE — TELEPHONE ENCOUNTER
Called [facility]([phone]) was transferred to medical records dept., unable to leave [facility] or speak with anyone,kept being disconnected.  Called pt' and he has attempted to get medical records and is unsuccessful.  Very hard to get records from [facility] everywhere

## 2024-06-05 ENCOUNTER — TELEPHONE (OUTPATIENT)
Dept: PULMONOLOGY | Age: 64
End: 2024-06-05

## 2024-06-05 NOTE — TELEPHONE ENCOUNTER
PSG Sleep study showed mild ALISIA (on a scale of mild, moderate and severe).  AHI was 5.6  per hr. (Average times per hr breathing was obstructed).  O2 Desaturations to 79% (lowest o2)   Dr Recommends:    Follow up with the patient's sleep physician to discuss results  A trial of CPAP titration is recommended with supplemental oxygen per protocol if needed.  Avoid sedatives, alcohol and caffeinated drinks at bedtime.  Avoid driving while sleepy.       The patient has been notified of this information and all questions answered.  Transferred to sleep lab

## 2024-06-06 ENCOUNTER — OFFICE VISIT (OUTPATIENT)
Dept: PRIMARY CARE CLINIC | Age: 64
End: 2024-06-06
Payer: MEDICAID

## 2024-06-06 VITALS
WEIGHT: 232.2 LBS | DIASTOLIC BLOOD PRESSURE: 72 MMHG | RESPIRATION RATE: 16 BRPM | SYSTOLIC BLOOD PRESSURE: 110 MMHG | HEIGHT: 77 IN | BODY MASS INDEX: 27.42 KG/M2 | TEMPERATURE: 97.2 F | OXYGEN SATURATION: 98 % | HEART RATE: 66 BPM

## 2024-06-06 DIAGNOSIS — F43.10 PTSD (POST-TRAUMATIC STRESS DISORDER): Primary | ICD-10-CM

## 2024-06-06 DIAGNOSIS — I10 ESSENTIAL HYPERTENSION: ICD-10-CM

## 2024-06-06 DIAGNOSIS — G47.33 OSA (OBSTRUCTIVE SLEEP APNEA): ICD-10-CM

## 2024-06-06 DIAGNOSIS — N52.9 ERECTILE DYSFUNCTION, UNSPECIFIED ERECTILE DYSFUNCTION TYPE: ICD-10-CM

## 2024-06-06 DIAGNOSIS — G43.109 MIGRAINE WITH AURA AND WITHOUT STATUS MIGRAINOSUS, NOT INTRACTABLE: ICD-10-CM

## 2024-06-06 DIAGNOSIS — E87.6 HYPOKALEMIA: ICD-10-CM

## 2024-06-06 PROCEDURE — 3074F SYST BP LT 130 MM HG: CPT | Performed by: FAMILY MEDICINE

## 2024-06-06 PROCEDURE — 3078F DIAST BP <80 MM HG: CPT | Performed by: FAMILY MEDICINE

## 2024-06-06 PROCEDURE — 99214 OFFICE O/P EST MOD 30 MIN: CPT | Performed by: FAMILY MEDICINE

## 2024-06-06 RX ORDER — POTASSIUM CHLORIDE 20 MEQ/1
20 TABLET, EXTENDED RELEASE ORAL DAILY
Qty: 90 TABLET | Refills: 0 | Status: SHIPPED | OUTPATIENT
Start: 2024-06-06

## 2024-06-06 RX ORDER — TADALAFIL 5 MG/1
5 TABLET ORAL DAILY PRN
Qty: 90 TABLET | Refills: 0 | Status: SHIPPED | OUTPATIENT
Start: 2024-06-06

## 2024-06-06 ASSESSMENT — PATIENT HEALTH QUESTIONNAIRE - PHQ9
SUM OF ALL RESPONSES TO PHQ QUESTIONS 1-9: 17
SUM OF ALL RESPONSES TO PHQ QUESTIONS 1-9: 19
9. THOUGHTS THAT YOU WOULD BE BETTER OFF DEAD, OR OF HURTING YOURSELF: MORE THAN HALF THE DAYS
SUM OF ALL RESPONSES TO PHQ9 QUESTIONS 1 & 2: 4
3. TROUBLE FALLING OR STAYING ASLEEP: MORE THAN HALF THE DAYS
2. FEELING DOWN, DEPRESSED OR HOPELESS: MORE THAN HALF THE DAYS
8. MOVING OR SPEAKING SO SLOWLY THAT OTHER PEOPLE COULD HAVE NOTICED. OR THE OPPOSITE, BEING SO FIGETY OR RESTLESS THAT YOU HAVE BEEN MOVING AROUND A LOT MORE THAN USUAL: MORE THAN HALF THE DAYS
1. LITTLE INTEREST OR PLEASURE IN DOING THINGS: MORE THAN HALF THE DAYS
SUM OF ALL RESPONSES TO PHQ QUESTIONS 1-9: 19
5. POOR APPETITE OR OVEREATING: MORE THAN HALF THE DAYS
10. IF YOU CHECKED OFF ANY PROBLEMS, HOW DIFFICULT HAVE THESE PROBLEMS MADE IT FOR YOU TO DO YOUR WORK, TAKE CARE OF THINGS AT HOME, OR GET ALONG WITH OTHER PEOPLE: SOMEWHAT DIFFICULT
6. FEELING BAD ABOUT YOURSELF - OR THAT YOU ARE A FAILURE OR HAVE LET YOURSELF OR YOUR FAMILY DOWN: NEARLY EVERY DAY
SUM OF ALL RESPONSES TO PHQ QUESTIONS 1-9: 19
4. FEELING TIRED OR HAVING LITTLE ENERGY: MORE THAN HALF THE DAYS
7. TROUBLE CONCENTRATING ON THINGS, SUCH AS READING THE NEWSPAPER OR WATCHING TELEVISION: MORE THAN HALF THE DAYS

## 2024-06-06 ASSESSMENT — ANXIETY QUESTIONNAIRES
1. FEELING NERVOUS, ANXIOUS, OR ON EDGE: NEARLY EVERY DAY
2. NOT BEING ABLE TO STOP OR CONTROL WORRYING: NEARLY EVERY DAY
IF YOU CHECKED OFF ANY PROBLEMS ON THIS QUESTIONNAIRE, HOW DIFFICULT HAVE THESE PROBLEMS MADE IT FOR YOU TO DO YOUR WORK, TAKE CARE OF THINGS AT HOME, OR GET ALONG WITH OTHER PEOPLE: SOMEWHAT DIFFICULT
6. BECOMING EASILY ANNOYED OR IRRITABLE: MORE THAN HALF THE DAYS
7. FEELING AFRAID AS IF SOMETHING AWFUL MIGHT HAPPEN: NEARLY EVERY DAY
GAD7 TOTAL SCORE: 18
5. BEING SO RESTLESS THAT IT IS HARD TO SIT STILL: MORE THAN HALF THE DAYS
3. WORRYING TOO MUCH ABOUT DIFFERENT THINGS: NEARLY EVERY DAY
4. TROUBLE RELAXING: MORE THAN HALF THE DAYS

## 2024-06-06 ASSESSMENT — COLUMBIA-SUICIDE SEVERITY RATING SCALE - C-SSRS
1. WITHIN THE PAST MONTH, HAVE YOU WISHED YOU WERE DEAD OR WISHED YOU COULD GO TO SLEEP AND NOT WAKE UP?: NO
2. HAVE YOU ACTUALLY HAD ANY THOUGHTS OF KILLING YOURSELF?: NO
6. HAVE YOU EVER DONE ANYTHING, STARTED TO DO ANYTHING, OR PREPARED TO DO ANYTHING TO END YOUR LIFE?: NO

## 2024-06-06 NOTE — PATIENT INSTRUCTIONS
Please start potassium pill, take one pill, once a day    Get blood work done in 1 week    Please [facility] with [facility] as scheduled in July

## 2024-06-06 NOTE — PROGRESS NOTES
Hypokalemia  Assessment & Plan:  Unstable: likely caused by HCTZ, discussed changing pt's BP med regimen as above, however pt declines any med changes at this time as he feels his meds have managed his BP well  -START potassium supplement  -Repeat BMP 1 week  -Check renin/yadi as prev ordered  Orders:  -     Basic Metabolic Panel; Future  5. ALISIA (obstructive sleep apnea)  Assessment & Plan:  Stable  -f/u as scheduled with sleep medicine for PAP titration  6. Erectile dysfunction, unspecified erectile dysfunction type  Comments:  Requesting refill on cialis, states this works adequately for him. Advised smoking, alcohol cessation  Orders:  -     tadalafil (CIALIS) 5 MG tablet; Take 1 tablet by mouth daily as needed for Erectile Dysfunction, Disp-90 tablet, R-0Normal       Return in about 4 weeks (around 7/4/2024) for ptsd, blood pressure.    Patient received counseling and, if relevant, printed instructions for symptoms/diagnoses listed documented in today's visit note. Typical counseling includes, but is not limited to, non-pharmacologic measures to manage listed symptoms and conditions; appropriate use, risks and benefits for all prescribed medications; potential interactions between medications both prescribed and OTC; diet; exercise; healthy behaviors; and goalsetting to improve health. Patient or responsible party was involved in shared decision making and had opportunity to have all questions answered.    For any lab/imaging orders as above, advised pt to go to complete these orders today/ASAP. Discussed need for lab monitoring for patient's safety and safe medication prescribing, pt agreeable and states they will complete today/ASAP.    Electronically signed by Daisy Choi MD on 6/6/2024 at 8:52 PM.

## 2024-06-16 PROBLEM — E87.6 HYPOKALEMIA: Status: ACTIVE | Noted: 2024-06-16

## 2024-06-19 ENCOUNTER — HOSPITAL ENCOUNTER (OUTPATIENT)
Dept: SLEEP CENTER | Age: 64
Discharge: HOME | End: 2024-06-19
Payer: MEDICAID

## 2024-06-19 DIAGNOSIS — G47.33 OSA (OBSTRUCTIVE SLEEP APNEA): ICD-10-CM

## 2024-06-19 PROCEDURE — 95811 POLYSOM 6/>YRS CPAP 4/> PARM: CPT

## 2024-06-20 NOTE — PROGRESS NOTES
Moshe Jesus Sr.         : 1960  [] [facility]     []  HealthCare      [] [facility]     [][facility]    [] [facility]  [] [facility]  [] [facility]   [] Retail Medical services [] Other:  Diagnosis: [x] ALISIA (G47.33) [] CSA (G47.31) [] Apnea (G47.30)   Length of Need: [] 12 Months [x] 99 Months [] Other:    Machine (DUANE!):  [x] ResMed AirSense     Auto [] Other:     [x]  CPAP () [] Bilevel ()   Mode: [x] Auto [] Spontaneous    Mode: [] Auto [] Spontaneous                                       7  cm                 Comfort Settings:     If the order for CPAP  - Ramp Pressure: 5 cmH2O                                        - Ramp time: 15 min                                     -  Flex/EPR - 3 full time       Humidifier: [x] Heated ()        [x] Water chamber replacement ()/ 1 per 6 months        Mask:  Please always start with the mask the patient used during the titraion    [x] Full Face () /1 per 3 months    [x] Patient Choice - Size and fit mask    [] Dispense: large Simplus full face     [x] Headgear () / 1 per 3 months    [x] Interface Replacement ()/1 per month            Tubing: [x] Heated ()/1 per 3 months    [] Standard ()/1 per 3 months [] Other:           Filters: [x] Non-disposable ()/1 per 6 months     [x] Ultra-Fine, Disposable ()/2 per month        Miscellaneous: [x] Chin Strap ()/ 1 per 6 months [] O2 bleed-in:       LPM   [] Oximetry on CPAP/Bilevel []  Other:          Start Order Date: 24    MEDICAL JUSTIFICATION:  I, the undersigned, certify that the above prescribed supplies are medically necessary for this patient’s wellbeing.  In my opinion, the supplies are both reasonable and necessary in reference to accepted standards of medicalpractice in treatment of this patient’s condition.    Zuleyma Shelley MD      NPI: [id number]       Order Signed Date: 24    Electronically signed by Zuleyma Shelley MD on 2024 at

## 2024-06-21 ENCOUNTER — TELEPHONE (OUTPATIENT)
Dept: PULMONOLOGY | Age: 64
End: 2024-06-21

## 2024-06-21 NOTE — TELEPHONE ENCOUNTER
Titration study shows adequate control of the events at a CPAP pressure of 7 cm.      DME:     Patient will need appointment 31-90 days after starting new machine    The patient has been notified of this information and all questions answered.  Will call back with DME

## 2024-07-11 ENCOUNTER — OFFICE VISIT (OUTPATIENT)
Dept: PRIMARY CARE CLINIC | Age: 64
End: 2024-07-11
Payer: MEDICAID

## 2024-07-11 VITALS
WEIGHT: 224.4 LBS | DIASTOLIC BLOOD PRESSURE: 79 MMHG | HEART RATE: 78 BPM | TEMPERATURE: 97 F | BODY MASS INDEX: 26.5 KG/M2 | HEIGHT: 77 IN | OXYGEN SATURATION: 99 % | SYSTOLIC BLOOD PRESSURE: 120 MMHG

## 2024-07-11 DIAGNOSIS — F43.10 PTSD (POST-TRAUMATIC STRESS DISORDER): Primary | ICD-10-CM

## 2024-07-11 DIAGNOSIS — E87.6 HYPOKALEMIA: ICD-10-CM

## 2024-07-11 PROCEDURE — 3078F DIAST BP <80 MM HG: CPT | Performed by: FAMILY MEDICINE

## 2024-07-11 PROCEDURE — 99215 OFFICE O/P EST HI 40 MIN: CPT | Performed by: FAMILY MEDICINE

## 2024-07-11 PROCEDURE — 3074F SYST BP LT 130 MM HG: CPT | Performed by: FAMILY MEDICINE

## 2024-07-11 SDOH — ECONOMIC STABILITY: FOOD INSECURITY: WITHIN THE PAST 12 MONTHS, THE FOOD YOU BOUGHT JUST DIDN'T LAST AND YOU DIDN'T HAVE MONEY TO GET MORE.: SOMETIMES TRUE

## 2024-07-11 SDOH — ECONOMIC STABILITY: INCOME INSECURITY: HOW HARD IS IT FOR YOU TO PAY FOR THE VERY BASICS LIKE FOOD, HOUSING, MEDICAL CARE, AND HEATING?: NOT VERY HARD

## 2024-07-11 SDOH — ECONOMIC STABILITY: FOOD INSECURITY: WITHIN THE PAST 12 MONTHS, YOU WORRIED THAT YOUR FOOD WOULD RUN OUT BEFORE YOU GOT MONEY TO BUY MORE.: SOMETIMES TRUE

## 2024-07-11 ASSESSMENT — PATIENT HEALTH QUESTIONNAIRE - PHQ9
2. FEELING DOWN, DEPRESSED OR HOPELESS: SEVERAL DAYS
1. LITTLE INTEREST OR PLEASURE IN DOING THINGS: MORE THAN HALF THE DAYS
4. FEELING TIRED OR HAVING LITTLE ENERGY: MORE THAN HALF THE DAYS
9. THOUGHTS THAT YOU WOULD BE BETTER OFF DEAD, OR OF HURTING YOURSELF: NEARLY EVERY DAY
5. POOR APPETITE OR OVEREATING: MORE THAN HALF THE DAYS
10. IF YOU CHECKED OFF ANY PROBLEMS, HOW DIFFICULT HAVE THESE PROBLEMS MADE IT FOR YOU TO DO YOUR WORK, TAKE CARE OF THINGS AT HOME, OR GET ALONG WITH OTHER PEOPLE: EXTREMELY DIFFICULT
8. MOVING OR SPEAKING SO SLOWLY THAT OTHER PEOPLE COULD HAVE NOTICED. OR THE OPPOSITE, BEING SO FIGETY OR RESTLESS THAT YOU HAVE BEEN MOVING AROUND A LOT MORE THAN USUAL: SEVERAL DAYS
6. FEELING BAD ABOUT YOURSELF - OR THAT YOU ARE A FAILURE OR HAVE LET YOURSELF OR YOUR FAMILY DOWN: MORE THAN HALF THE DAYS
SUM OF ALL RESPONSES TO PHQ QUESTIONS 1-9: 14
SUM OF ALL RESPONSES TO PHQ QUESTIONS 1-9: 17
SUM OF ALL RESPONSES TO PHQ QUESTIONS 1-9: 17
SUM OF ALL RESPONSES TO PHQ9 QUESTIONS 1 & 2: 3
3. TROUBLE FALLING OR STAYING ASLEEP: MORE THAN HALF THE DAYS
SUM OF ALL RESPONSES TO PHQ QUESTIONS 1-9: 17
7. TROUBLE CONCENTRATING ON THINGS, SUCH AS READING THE NEWSPAPER OR WATCHING TELEVISION: MORE THAN HALF THE DAYS

## 2024-07-11 ASSESSMENT — COLUMBIA-SUICIDE SEVERITY RATING SCALE - C-SSRS
1. WITHIN THE PAST MONTH, HAVE YOU WISHED YOU WERE DEAD OR WISHED YOU COULD GO TO SLEEP AND NOT WAKE UP?: YES
3. HAVE YOU BEEN THINKING ABOUT HOW YOU MIGHT KILL YOURSELF?: YES
5. HAVE YOU STARTED TO WORK OUT OR WORKED OUT THE DETAILS OF HOW TO KILL YOURSELF? DO YOU INTEND TO CARRY OUT THIS PLAN?: NO
6. HAVE YOU EVER DONE ANYTHING, STARTED TO DO ANYTHING, OR PREPARED TO DO ANYTHING TO END YOUR LIFE?: NO
4. HAVE YOU HAD THESE THOUGHTS AND HAD SOME INTENTION OF ACTING ON THEM?: YES
2. HAVE YOU ACTUALLY HAD ANY THOUGHTS OF KILLING YOURSELF?: YES

## 2024-07-11 NOTE — PATIENT INSTRUCTIONS
[facility] Food Resources*  (Call [employer]/211 if need more resources.)          [employer] 211   Speak to a trained professional 24/7 who can connect you to essential community services including food, clothing, transportation, housing, utilities, employment services, childcare, and baby supplies. 211 serves nationwide.  [facility].org for resources in [place], [facility], [place] and [facility] in [place]; [place], [place], [place], and [facility] in [place].   [facility].org/resources for resources in [facility], [place], [place], [place], [place], [place], [place], [facility], [place], [place], and [facility] in [place].    [employer]   What they offer: [employer] is a nationwide network of food jonas, food pantries, and meal programs.  Website: https://www.feedingamerica.org/find-your-local-foodbank      National Hunger Hotline  What they offer: The hotline is a resource for individuals and families seeking information on how and where to obtain food.   Website:  https://www.hungerfreeamerica.org/en-us/[facility]-national-hunger-hotline    Hunger Hotline Phone Number: 5-131-0-HUNGRY (1-377.872.7915)  Hours of Operation: Monday - Friday 7am to 10pm   The Hunger Hotline also operates a texting service. Our number is 118-974-6262. Please note that these are automated texts and we do not reply or monitor texts.   [employer] [place]  What they offer: $1 for $1 matching for families receiving SNAP/food stamps when spent on “healthy” food.  The match money can be used to purchase fruits and vegetables so adds more healthy food choices for SNAP beneficiaries.   Contact: https://[employer].EqualEyes/locations/     SNAP (formerly [employer])   What they offer: SNAP is used like cash to buy eligible food items from authorized retailers.  Apply for benefits online: https://jfs.[place].gov/ofam/foodstamps.stm     Apply for benefits by phone or in-person by visiting your local [employer] Family Services.

## 2024-07-15 RX ORDER — SERTRALINE HYDROCHLORIDE 100 MG/1
100 TABLET, FILM COATED ORAL DAILY
Qty: 90 TABLET | Refills: 1 | Status: SHIPPED | OUTPATIENT
Start: 2024-07-15

## 2024-07-15 RX ORDER — AMITRIPTYLINE HYDROCHLORIDE 10 MG/1
10 TABLET, FILM COATED ORAL NIGHTLY
Qty: 90 TABLET | Refills: 1 | Status: SHIPPED | OUTPATIENT
Start: 2024-07-15

## 2024-07-29 ENCOUNTER — TELEPHONE (OUTPATIENT)
Dept: PRIMARY CARE CLINIC | Age: 64
End: 2024-07-29

## 2024-07-30 ASSESSMENT — ENCOUNTER SYMPTOMS
COUGH: 0
SHORTNESS OF BREATH: 0

## 2024-07-30 NOTE — PROGRESS NOTES
2024    Vitals:    24 1004   BP: 120/79   Pulse: 78   Temp: 97 °F (36.1 °C)   TempSrc: Temporal   SpO2: 99%   Weight: 101.8 kg (224 lb 6.4 oz)   Height: 1.956 m (6' 5\")       Moshe Jesus Sr. (:  1960) is a 64 y.o. male, Established patient, here for evaluation of the following:    Chief complaint(s):  Hypertension and Follow-up      This documentation utilizes problem-oriented charting. Problems discussed today, Assessment, and Plan are listed first, followed by historical information reviewed, exam/objective data reviewed, and review of assessment/orders and follow-up.    PROBLEMS DISCUSSED TODAY, ASSESSMENT, & PLAN    1. PTSD (post-traumatic stress disorder)  Overview:  24:  Taking zoloft 100mg daily + amitriptyline 10mg daily  PHQ9= 17 today with 3 for Q9  Pt states he moved since last appt, reports new place is a lot better  Despite this, pt reports he is having a bad day  Pt reports feeling on edge and angry  Pt reports feeling very scared to go outside and be in public, though notes he does walk to get groceries and uses walking to cope and that this helps him feel better  Has been walking a lot to cope, lost weight, was 270lbs in , now 224lbs, states he has been trying to lose weight  Pt states he doesn't want to be around others because he is afraid he will \"go off on someone\". Reports this last happened yesterday, was talking to a friend and told him he was struggling, ended up yelling at his friend  Feeling worse recently, worse today  Pt reports persistent intermittent thoughts that \"I just don't want to be here anymore\" because of how bad he feels, at times, had also had thoughts about death and dying. However he denies these thoughts currently. Reports intermittent thoughts about hurting himself, has thought about shooting himself or cutting his wrists but states he has never acted on these thoughts and denies these thoughts currently. Pt states that he does feel safe,

## 2024-07-30 NOTE — ASSESSMENT & PLAN NOTE
Unstable: likely caused by HCTZ, discussed changing pt's BP med regimen as above, however pt declines any med changes at this time as he feels his meds have managed his BP well  -Did not get labs done as prev recommended, overdue to recheck potassium and to check renin/yadi as prev ordered. Advised pt to get this blood work done today/ASAP  -Reviewed that hypokalemia is a known SE of tx with HCTZ and reviewed risks including potentially fatal cardiac arrhythmia. Pt does not want to change BP meds at this time, states he will take the potassium supplement, advised him to take this as prescribed

## 2024-07-30 NOTE — ASSESSMENT & PLAN NOTE
Unstable: PTSD + likely EDY+/-MDD  -Safety: due to pt reported chronic intermittent suicidal thoughts in setting of recent worsening in mood and anxiety, recommended inpatient psychiatric admission for further evaluation and treatment. [facility], however pt declines at this time, denies current thoughts of, plan, or intent to harm himself and states when he does have the above thoughts they are fleeting and he has not/will not act on them. States that he feels safe and would like to continue treatment in the outpatient setting (though refuses dose adjustments of his medications as below). [facility], pt discussed his current emotional state with crisis worker who advised pt safe to continue tx in outpatient setting, but that a [facility]  would call pt in next 24 hours to follow-up and help pt est with outpatient [facility] care through the [facility].   -Safety plan: Denies SI/HI currently. States he has had persistent intermittent thoughts about not wanting to live feeling like this but denies thoughts of, plan or intent to hurt or kill himself at this time. Pt states if he develops SI, he will go for a walk and then it usually goes away. If this doesn't help pt states he will call/go to the [facility] and request psychiatric crisis services/admission, states he feels comfortable seeking mental health care through the [facility].  -UPDATE: pt [facility] with [facility], had initial appt 7/15  -Again advised increasing his dose of either sertraline or amitriptyline for improved sx control, however pt refuses medication changes at this time, would not like to make any medication changes until he sees/obtains recommendations from [facility]  -Continue sertraline 100mg daily  -Continue amitriptyline 10mg QHS for augmentation and migraine ppx, see separate problem  -Again called pt's brother, Zachary, on 7/11 and confirmed he still has pt's gun in his

## 2024-08-07 ENCOUNTER — TELEPHONE (OUTPATIENT)
Dept: PRIMARY CARE CLINIC | Age: 64
End: 2024-08-07

## 2024-08-07 NOTE — TELEPHONE ENCOUNTER
His big toe toe nail split in half when he cutting his nails. Not bleeding.  Advised to go to Urgent Care or ER. He will call back for F/U.

## 2024-08-08 DIAGNOSIS — M10.00 IDIOPATHIC GOUT, UNSPECIFIED CHRONICITY, UNSPECIFIED SITE: ICD-10-CM

## 2024-08-08 RX ORDER — COLCHICINE 0.6 MG/1
TABLET ORAL
Qty: 11 TABLET | Refills: 2 | Status: SHIPPED | OUTPATIENT
Start: 2024-08-08

## 2024-08-08 NOTE — TELEPHONE ENCOUNTER
Medication:   Requested Prescriptions     Pending Prescriptions Disp Refills    colchicine (COLCRYS) 0.6 MG tablet 11 tablet 2     Sig: Take 2 tablets (1.2mg) on first day of a gout flare, then take one tablet (0.6mg) daily after this, until the gout flare resolves, for max of 7 days-10 days        Last Filled:      Patient Phone Number: [phone] (home)     Last appt: 7/11/2024   Next appt: 10/8/2024    Last OARRS:       4/2/2018    11:49 AM   RX Monitoring   Attestation The Prescription Monitoring Report for this patient was reviewed today.   Periodic Controlled Substance Monitoring Possible medication side effects, risk of tolerance/dependence & alternative treatments discussed.;Random urine drug screen sent today.   Chronic Pain > 80 MEDD Treatment objectives documented - patient is progressing appropriately.

## 2024-08-08 NOTE — TELEPHONE ENCOUNTER
PT called in requesting a refill for his Colchicine 0.6 mg.     Please send to the [facility] on [address]     NOV: 10/8/24 (with Dr. Bautista)    Best call back number: [phone]

## 2024-08-09 ENCOUNTER — TELEPHONE (OUTPATIENT)
Dept: ADMINISTRATIVE | Age: 64
End: 2024-08-09

## 2024-08-09 NOTE — TELEPHONE ENCOUNTER
Submitted PA for Colchicine 0.6MG tablets   Via CM (Key: [id number]) STATUS: PENDING.    Follow up done daily; if no decision with in three days we will refax.  If another three days goes by with no decision will call the insurance for status.

## 2024-08-12 DIAGNOSIS — M10.00 IDIOPATHIC GOUT, UNSPECIFIED CHRONICITY, UNSPECIFIED SITE: ICD-10-CM

## 2024-08-12 RX ORDER — COLCHICINE 0.6 MG/1
TABLET ORAL
Qty: 30 TABLET | Refills: 2 | Status: SHIPPED | OUTPATIENT
Start: 2024-08-12

## 2024-08-12 NOTE — TELEPHONE ENCOUNTER
The medication is APPROVED THRU 08/06/2025    If this requires a response please respond to the pool ( P [facility] MEDICATION PRE-AUTH).      Thank you please advise patient.

## 2024-08-12 NOTE — TELEPHONE ENCOUNTER
Called pt and informed him that his PA for medication was approved.. pt was upset because he was on given 11 tablets and requested 30... Order for 30 tablets is pended..    Please advise    Medication:   Requested Prescriptions     Pending Prescriptions Disp Refills    colchicine (COLCRYS) 0.6 MG tablet 30 tablet 2     Sig: Take 2 tablets (1.2mg) on first day of a gout flare, then take one tablet (0.6mg) daily after this, until the gout flare resolves, for max of 7 days-10 days        Last Filled:  [unfilled]    Patient Phone Number: [phone] (home)     Last appt: 7/11/2024   Next appt: 10/8/2024    Last OARRS:       4/2/2018    11:49 AM   RX Monitoring   Attestation The Prescription Monitoring Report for this patient was reviewed today.   Periodic Controlled Substance Monitoring Possible medication side effects, risk of tolerance/dependence & alternative treatments discussed.;Random urine drug screen sent today.   Chronic Pain > 80 MEDD Treatment objectives documented - patient is progressing appropriately.

## 2024-10-08 ENCOUNTER — OFFICE VISIT (OUTPATIENT)
Dept: PRIMARY CARE CLINIC | Age: 64
End: 2024-10-08
Payer: MEDICAID

## 2024-10-08 VITALS
HEIGHT: 77 IN | HEART RATE: 62 BPM | BODY MASS INDEX: 26.68 KG/M2 | TEMPERATURE: 97.5 F | SYSTOLIC BLOOD PRESSURE: 117 MMHG | DIASTOLIC BLOOD PRESSURE: 80 MMHG | WEIGHT: 226 LBS | OXYGEN SATURATION: 98 %

## 2024-10-08 DIAGNOSIS — M17.0 PRIMARY OSTEOARTHRITIS OF BOTH KNEES: Primary | ICD-10-CM

## 2024-10-08 DIAGNOSIS — F43.10 PTSD (POST-TRAUMATIC STRESS DISORDER): ICD-10-CM

## 2024-10-08 DIAGNOSIS — E87.6 HYPOKALEMIA: ICD-10-CM

## 2024-10-08 DIAGNOSIS — Z79.1 NSAID LONG-TERM USE: ICD-10-CM

## 2024-10-08 DIAGNOSIS — Z87.891 PERSONAL HISTORY OF TOBACCO USE, PRESENTING HAZARDS TO HEALTH: ICD-10-CM

## 2024-10-08 DIAGNOSIS — Z23 NEEDS FLU SHOT: ICD-10-CM

## 2024-10-08 DIAGNOSIS — F17.200 TOBACCO DEPENDENCE: ICD-10-CM

## 2024-10-08 LAB
ANION GAP SERPL CALCULATED.3IONS-SCNC: 14 MMOL/L (ref 3–16)
BUN SERPL-MCNC: 20 MG/DL (ref 7–20)
CALCIUM SERPL-MCNC: 10.1 MG/DL (ref 8.3–10.6)
CHLORIDE SERPL-SCNC: 97 MMOL/L (ref 99–110)
CO2 SERPL-SCNC: 27 MMOL/L (ref 21–32)
CREAT SERPL-MCNC: 0.8 MG/DL (ref 0.8–1.3)
GFR SERPLBLD CREATININE-BSD FMLA CKD-EPI: >90 ML/MIN/{1.73_M2}
GLUCOSE SERPL-MCNC: 75 MG/DL (ref 70–99)
POTASSIUM SERPL-SCNC: 4.5 MMOL/L (ref 3.5–5.1)
SODIUM SERPL-SCNC: 138 MMOL/L (ref 136–145)

## 2024-10-08 PROCEDURE — 3074F SYST BP LT 130 MM HG: CPT | Performed by: STUDENT IN AN ORGANIZED HEALTH CARE EDUCATION/TRAINING PROGRAM

## 2024-10-08 PROCEDURE — 99214 OFFICE O/P EST MOD 30 MIN: CPT | Performed by: STUDENT IN AN ORGANIZED HEALTH CARE EDUCATION/TRAINING PROGRAM

## 2024-10-08 PROCEDURE — 3079F DIAST BP 80-89 MM HG: CPT | Performed by: STUDENT IN AN ORGANIZED HEALTH CARE EDUCATION/TRAINING PROGRAM

## 2024-10-08 PROCEDURE — 99406 BEHAV CHNG SMOKING 3-10 MIN: CPT | Performed by: STUDENT IN AN ORGANIZED HEALTH CARE EDUCATION/TRAINING PROGRAM

## 2024-10-08 PROCEDURE — 90471 IMMUNIZATION ADMIN: CPT | Performed by: STUDENT IN AN ORGANIZED HEALTH CARE EDUCATION/TRAINING PROGRAM

## 2024-10-08 PROCEDURE — 90661 CCIIV3 VAC ABX FR 0.5 ML IM: CPT | Performed by: STUDENT IN AN ORGANIZED HEALTH CARE EDUCATION/TRAINING PROGRAM

## 2024-10-08 RX ORDER — SERTRALINE HYDROCHLORIDE 100 MG/1
100 TABLET, FILM COATED ORAL DAILY
Qty: 90 TABLET | Refills: 1 | Status: SHIPPED | OUTPATIENT
Start: 2024-10-08

## 2024-10-08 RX ORDER — AMITRIPTYLINE HYDROCHLORIDE 10 MG/1
10 TABLET ORAL NIGHTLY
Qty: 90 TABLET | Refills: 1 | Status: SHIPPED | OUTPATIENT
Start: 2024-10-08

## 2024-10-08 RX ORDER — KETOPROFEN 25 MG/1
25 CAPSULE ORAL 2 TIMES DAILY
Qty: 120 EACH | Refills: 2 | Status: SHIPPED | OUTPATIENT
Start: 2024-10-08

## 2024-10-08 RX ORDER — KETOPROFEN 25 MG/1
CAPSULE ORAL
COMMUNITY
End: 2024-10-08 | Stop reason: SDUPTHER

## 2024-10-08 SDOH — ECONOMIC STABILITY: FOOD INSECURITY: WITHIN THE PAST 12 MONTHS, YOU WORRIED THAT YOUR FOOD WOULD RUN OUT BEFORE YOU GOT MONEY TO BUY MORE.: PATIENT DECLINED

## 2024-10-08 SDOH — ECONOMIC STABILITY: FOOD INSECURITY: WITHIN THE PAST 12 MONTHS, THE FOOD YOU BOUGHT JUST DIDN'T LAST AND YOU DIDN'T HAVE MONEY TO GET MORE.: PATIENT DECLINED

## 2024-10-08 SDOH — ECONOMIC STABILITY: INCOME INSECURITY: HOW HARD IS IT FOR YOU TO PAY FOR THE VERY BASICS LIKE FOOD, HOUSING, MEDICAL CARE, AND HEATING?: PATIENT DECLINED

## 2024-10-08 ASSESSMENT — PATIENT HEALTH QUESTIONNAIRE - PHQ9
5. POOR APPETITE OR OVEREATING: SEVERAL DAYS
9. THOUGHTS THAT YOU WOULD BE BETTER OFF DEAD, OR OF HURTING YOURSELF: NOT AT ALL
2. FEELING DOWN, DEPRESSED OR HOPELESS: SEVERAL DAYS
SUM OF ALL RESPONSES TO PHQ QUESTIONS 1-9: 6
6. FEELING BAD ABOUT YOURSELF - OR THAT YOU ARE A FAILURE OR HAVE LET YOURSELF OR YOUR FAMILY DOWN: SEVERAL DAYS
SUM OF ALL RESPONSES TO PHQ QUESTIONS 1-9: 6
7. TROUBLE CONCENTRATING ON THINGS, SUCH AS READING THE NEWSPAPER OR WATCHING TELEVISION: SEVERAL DAYS
3. TROUBLE FALLING OR STAYING ASLEEP: SEVERAL DAYS
SUM OF ALL RESPONSES TO PHQ9 QUESTIONS 1 & 2: 1
SUM OF ALL RESPONSES TO PHQ QUESTIONS 1-9: 6
10. IF YOU CHECKED OFF ANY PROBLEMS, HOW DIFFICULT HAVE THESE PROBLEMS MADE IT FOR YOU TO DO YOUR WORK, TAKE CARE OF THINGS AT HOME, OR GET ALONG WITH OTHER PEOPLE: NOT DIFFICULT AT ALL
8. MOVING OR SPEAKING SO SLOWLY THAT OTHER PEOPLE COULD HAVE NOTICED. OR THE OPPOSITE, BEING SO FIGETY OR RESTLESS THAT YOU HAVE BEEN MOVING AROUND A LOT MORE THAN USUAL: NOT AT ALL
SUM OF ALL RESPONSES TO PHQ QUESTIONS 1-9: 6
4. FEELING TIRED OR HAVING LITTLE ENERGY: SEVERAL DAYS

## 2024-10-08 NOTE — ASSESSMENT & PLAN NOTE
Chronic, at goal (stable), continue current treatment plan  Patient [facility], patient aware that he should either call the office or go to the [facility] ER if he is having suicidal thoughts that are persisting and has a plan.  Refilled:  Orders:    amitriptyline (ELAVIL) 10 MG tablet; Take 1 tablet by mouth nightly    sertraline (ZOLOFT) 100 MG tablet; Take 1 tablet by mouth daily

## 2024-10-08 NOTE — PROGRESS NOTES
Office Note  10/8/2024  Patient Name: Moshe Jesus Sr.  MRN: [id number] : 1960    SUBJECTIVE:     CHIEF COMPLAINT:  Chief Complaint   Patient presents with    New Patient     Former Dr Choi pt here to [facility] care  Joint inflammation  R pinky finger       HISTORY OF PRESENT ILLNESS:  He presents today to meet new PCP. Previous PCP Dr. Alexander.    Joint inflammation:  -Ongoing for a while, chronic   -Does have a history of gout and alcohol use disorder  -patient states he is no longer drinking as much he used to. States in the last 3 months he's only drank about once a month.   -Is taking allopurinol as directed, has colchicine in case of flares  -has hx of bilateral knee OA and takes Ketoprofen for this usually but has ran out and would like more refills.     Depression/PTSD:  Has history of PTSD  PHQ-9= 6 (which is improved from last office visit in July which was 17)  [facility], is meant to be taking zoloft 100mg daily + amitriptyline 10mg daily   Admits to some suicidal ideations and does not have a plan currently states he is \"too chicken and besides he wants to make it into heaven\"  States that if he has changes in these suicidal thoughts he will let office or go to the [facility] ER  [facility] visit with the [facility] was yesterday virtually, due for follow-up 24  Has a gf in [place] that he visits every winter for 3-4 months    Tobacco use :  -1 pack lasts 3-4 days now  -not ready to quit as of yet     Past Medical History:  Past Medical History:   Diagnosis Date    Anxiety     Gout     Hyperlipidemia     Hypertension     Knee pain      Past Surgical History:  Past Surgical History:   Procedure Laterality Date    FINGER FRACTURE SURGERY Left     2 MIDDLE FINGERS    KNEE ARTHROSCOPY  right knee    SHOULDER ARTHROSCOPY Right 2020    RIGHT SHOULDER ARTHROSCOPY, SUBACROMIAL DECOMPRESSION, ROTATOR CUFF REPAIR AND BICEPS TENDONISIS performed by Jersey Young MD at [facility] OR

## 2024-10-08 NOTE — ASSESSMENT & PLAN NOTE
Chronic, at goal (stable), continue current treatment plan    Orders:    Ketoprofen 25 MG CAPS; Take 25 mg by mouth 2 times daily

## 2024-10-09 DIAGNOSIS — I10 ESSENTIAL HYPERTENSION: ICD-10-CM

## 2024-10-09 RX ORDER — LORATADINE 10 MG/1
10 TABLET ORAL DAILY
Qty: 90 TABLET | Refills: 1 | Status: SHIPPED | OUTPATIENT
Start: 2024-10-09

## 2024-10-09 RX ORDER — BISOPROLOL FUMARATE AND HYDROCHLOROTHIAZIDE 10; 6.25 MG/1; MG/1
1 TABLET ORAL 2 TIMES DAILY
Qty: 180 TABLET | Refills: 0 | Status: SHIPPED | OUTPATIENT
Start: 2024-10-09

## 2024-12-31 ENCOUNTER — TELEPHONE (OUTPATIENT)
Dept: PRIMARY CARE CLINIC | Age: 64
End: 2024-12-31

## 2024-12-31 DIAGNOSIS — M17.0 PRIMARY OSTEOARTHRITIS OF BOTH KNEES: ICD-10-CM

## 2024-12-31 DIAGNOSIS — Z79.1 NSAID LONG-TERM USE: Primary | ICD-10-CM

## 2024-12-31 NOTE — TELEPHONE ENCOUNTER
Pt called he stated that this medication should be 10 mg instead of 25 mg     Ketoprofen 25 MG [facility] #[id number] [address] [phone] -  [phone] [[id number]]    Ipledge Number (Optional): [id number] Ipledge Number (Optional): [id number]

## 2024-12-31 NOTE — TELEPHONE ENCOUNTER
Ketoprofen is not available in 10 mg. Has been on 25 mg or higher in the past. Patient should also only use this sparingly as he does have hx of HTN.

## 2025-01-02 NOTE — TELEPHONE ENCOUNTER
Called pharmacy and talked to Carla she confirmed that they did not have he 10 mg.. called back pt and informed him that the 10 mg does not exist and that he has been taking the 25 mg.. he stated that he understand

## 2025-01-06 DIAGNOSIS — I10 ESSENTIAL HYPERTENSION: ICD-10-CM

## 2025-01-06 RX ORDER — BISOPROLOL FUMARATE AND HYDROCHLOROTHIAZIDE 10; 6.25 MG/1; MG/1
1 TABLET ORAL 2 TIMES DAILY
Qty: 60 TABLET | Refills: 1 | Status: SHIPPED | OUTPATIENT
Start: 2025-01-06

## 2025-01-21 DIAGNOSIS — I10 ESSENTIAL HYPERTENSION: ICD-10-CM

## 2025-01-21 RX ORDER — BISOPROLOL FUMARATE AND HYDROCHLOROTHIAZIDE 10; 6.25 MG/1; MG/1
1 TABLET ORAL 2 TIMES DAILY
Qty: 180 TABLET | Refills: 1 | OUTPATIENT
Start: 2025-01-21

## 2025-01-22 ENCOUNTER — OFFICE VISIT (OUTPATIENT)
Dept: PRIMARY CARE CLINIC | Age: 65
End: 2025-01-22
Payer: MEDICAID

## 2025-01-22 VITALS
BODY MASS INDEX: 27.98 KG/M2 | WEIGHT: 237 LBS | HEART RATE: 74 BPM | TEMPERATURE: 97 F | DIASTOLIC BLOOD PRESSURE: 70 MMHG | SYSTOLIC BLOOD PRESSURE: 107 MMHG | HEIGHT: 77 IN | OXYGEN SATURATION: 98 %

## 2025-01-22 DIAGNOSIS — G89.29 CHRONIC PAIN OF LEFT KNEE: ICD-10-CM

## 2025-01-22 DIAGNOSIS — M25.562 CHRONIC PAIN OF LEFT KNEE: ICD-10-CM

## 2025-01-22 DIAGNOSIS — M25.562 ACUTE PAIN OF LEFT KNEE: Primary | ICD-10-CM

## 2025-01-22 PROCEDURE — 3078F DIAST BP <80 MM HG: CPT | Performed by: STUDENT IN AN ORGANIZED HEALTH CARE EDUCATION/TRAINING PROGRAM

## 2025-01-22 PROCEDURE — 3074F SYST BP LT 130 MM HG: CPT | Performed by: STUDENT IN AN ORGANIZED HEALTH CARE EDUCATION/TRAINING PROGRAM

## 2025-01-22 PROCEDURE — 99214 OFFICE O/P EST MOD 30 MIN: CPT | Performed by: STUDENT IN AN ORGANIZED HEALTH CARE EDUCATION/TRAINING PROGRAM

## 2025-01-22 RX ORDER — PREDNISONE 20 MG/1
20 TABLET ORAL DAILY
Qty: 5 TABLET | Refills: 0 | Status: SHIPPED | OUTPATIENT
Start: 2025-01-22 | End: 2025-01-27

## 2025-01-22 RX ORDER — METHOCARBAMOL 500 MG/1
500 TABLET, FILM COATED ORAL 3 TIMES DAILY PRN
Qty: 30 TABLET | Refills: 1 | Status: SHIPPED | OUTPATIENT
Start: 2025-01-22

## 2025-01-22 RX ORDER — MELOXICAM 7.5 MG/1
7.5 TABLET ORAL DAILY
Qty: 90 TABLET | Refills: 0 | Status: SHIPPED | OUTPATIENT
Start: 2025-01-22

## 2025-01-22 SDOH — ECONOMIC STABILITY: FOOD INSECURITY: WITHIN THE PAST 12 MONTHS, THE FOOD YOU BOUGHT JUST DIDN'T LAST AND YOU DIDN'T HAVE MONEY TO GET MORE.: NEVER TRUE

## 2025-01-22 SDOH — ECONOMIC STABILITY: FOOD INSECURITY: WITHIN THE PAST 12 MONTHS, YOU WORRIED THAT YOUR FOOD WOULD RUN OUT BEFORE YOU GOT MONEY TO BUY MORE.: NEVER TRUE

## 2025-01-22 NOTE — PROGRESS NOTES
Office Note  2025  Patient Name: Moshe Jesus Sr.  MRN: [id number] : 1960    SUBJECTIVE:     CHIEF COMPLAINT:  Chief Complaint   Patient presents with    Follow-up     Med Check  BP  Left knee swelling       HISTORY OF PRESENT ILLNESS:  He presents today with the following concerns:    Knee pain:  -worsening left knee pain for the last few weeks  -states that the pain is more located in the anterior knee  -went to ER on , treated with steroid and robaxin  -today patient states pain is back   -does admit to feeling warm and slightly more swollen, denies fever  -does have a hx of OA and gout.  Patient states that the pain does not feel like a gout flare.  -Patient does do a lot of climbing up and down stairs  -not taken any antiinflammatories       Past Medical History:  Past Medical History:   Diagnosis Date    Anxiety     Gout     Hyperlipidemia     Hypertension     Knee pain      Past Surgical History:  Past Surgical History:   Procedure Laterality Date    FINGER FRACTURE SURGERY Left     2 MIDDLE FINGERS    KNEE ARTHROSCOPY  right knee    SHOULDER ARTHROSCOPY Right 2020    RIGHT SHOULDER ARTHROSCOPY, SUBACROMIAL DECOMPRESSION, ROTATOR CUFF REPAIR AND BICEPS TENDONISIS performed by Jersey Young MD at [facility] OR     Medications:  Current Outpatient Medications   Medication Sig Dispense Refill    meloxicam (MOBIC) 7.5 MG tablet Take 1 tablet by mouth daily (Can take up to two daily) 90 tablet 0    predniSONE (DELTASONE) 20 MG tablet Take 1 tablet by mouth daily for 5 days 5 tablet 0    methocarbamol (ROBAXIN) 500 MG tablet Take 1 tablet by mouth 3 times daily as needed (muscle pain) 30 tablet 1    Elastic Bandages & Supports (KNEE BRACE) [facility] Please dispense appropriately sized knee brace 1 each 0    bisoprolol-hydroCHLOROthiazide (ZIAC) 10-6.25 MG per tablet TAKE 1 TABLET BY MOUTH TWICE A DAY 60 tablet 1    loratadine (CLARITIN) 10 MG tablet Take 1 tablet by mouth daily 90 tablet 1

## 2025-01-23 ENCOUNTER — OFFICE VISIT (OUTPATIENT)
Dept: ORTHOPEDIC SURGERY | Age: 65
End: 2025-01-23

## 2025-01-23 VITALS — BODY MASS INDEX: 28.1 KG/M2 | HEIGHT: 77 IN

## 2025-01-23 DIAGNOSIS — M17.12 PRIMARY OSTEOARTHRITIS OF LEFT KNEE: ICD-10-CM

## 2025-01-23 DIAGNOSIS — M25.562 LEFT KNEE PAIN, UNSPECIFIED CHRONICITY: Primary | ICD-10-CM

## 2025-01-23 RX ORDER — BUPIVACAINE HYDROCHLORIDE 2.5 MG/ML
2 INJECTION, SOLUTION INFILTRATION; PERINEURAL ONCE
Status: COMPLETED | OUTPATIENT
Start: 2025-01-23 | End: 2025-01-23

## 2025-01-23 RX ORDER — TRIAMCINOLONE ACETONIDE 40 MG/ML
40 INJECTION, SUSPENSION INTRA-ARTICULAR; INTRAMUSCULAR ONCE
Status: COMPLETED | OUTPATIENT
Start: 2025-01-23 | End: 2025-01-23

## 2025-01-23 RX ADMIN — TRIAMCINOLONE ACETONIDE 40 MG: 40 INJECTION, SUSPENSION INTRA-ARTICULAR; INTRAMUSCULAR at 13:05

## 2025-01-23 RX ADMIN — BUPIVACAINE HYDROCHLORIDE 5 MG: 2.5 INJECTION, SOLUTION INFILTRATION; PERINEURAL at 13:05

## 2025-01-24 NOTE — PROGRESS NOTES
This dictation was done with [employer] dictation and may contain mechanical errors related to translation.    I have today reviewed with Moshe Jesus Sr. the clinically relevant, past medical history, medications, allergies, family history, social history, and Review Of Systems form the patient’s most recent history form & I have documented any details relevant to today's presenting complaints in my history below. Mr. Moshe Jesus Sr.'s self-reported past medical history, medications, allergies, family history, social history, and Review Of Systems form has been scanned into the chart under the \"Media\" tab.    Subjective:  Moshe Jesus Sr. is a 65 y.o. who is here as a well-established patient complaining pain in his left knee over the last 5 or 6 days.  He does remember specific injury or surgery but pain seems to be in anterior and medial aspect of his left knee with crepitus and a 6 out of 10 pain rating.  He was sent for x-rays including an AP lateral sunrise view and tunnel view      Patient Active Problem List   Diagnosis    Essential hypertension    Erectile dysfunction    Idiopathic gout    Primary osteoarthritis of both knees    Traumatic complete tear of right rotator cuff    PTSD (post-traumatic stress disorder)    Alcohol use disorder    Hypercholesterolemia    Elevated LFTs    Migraine with aura and without status migrainosus, not intractable    ALISIA (obstructive sleep apnea)    Hypokalemia           Current Outpatient Medications on File Prior to Visit   Medication Sig Dispense Refill    meloxicam (MOBIC) 7.5 MG tablet Take 1 tablet by mouth daily (Can take up to two daily) 90 tablet 0    predniSONE (DELTASONE) 20 MG tablet Take 1 tablet by mouth daily for 5 days 5 tablet 0    methocarbamol (ROBAXIN) 500 MG tablet Take 1 tablet by mouth 3 times daily as needed (muscle pain) 30 tablet 1    Elastic Bandages & Supports (KNEE BRACE) [facility] Please dispense appropriately sized knee brace 1 each 0

## 2025-02-27 DIAGNOSIS — E78.00 HYPERCHOLESTEROLEMIA: ICD-10-CM

## 2025-02-27 RX ORDER — ATORVASTATIN CALCIUM 80 MG/1
80 TABLET, FILM COATED ORAL DAILY
Qty: 90 TABLET | Refills: 1 | Status: CANCELLED | OUTPATIENT
Start: 2025-02-27

## 2025-02-27 NOTE — TELEPHONE ENCOUNTER
Medication:   Requested Prescriptions     Pending Prescriptions Disp Refills    atorvastatin (LIPITOR) 80 MG tablet 90 tablet 1     Sig: Take 1 tablet by mouth daily        Last Filled:  [unfilled]    Patient Phone Number: [phone] (home)     Last appt: 1/22/2025   Next appt: 3/24/2025    Last OARRS:       4/2/2018    11:49 AM   RX Monitoring   Attestation The Prescription Monitoring Report for this patient was reviewed today.   Periodic Controlled Substance Monitoring Possible medication side effects, risk of tolerance/dependence & alternative treatments discussed.;Random urine drug screen sent today.   Chronic Pain > 80 MEDD Treatment objectives documented - patient is progressing appropriately.

## 2025-03-24 ENCOUNTER — RESULTS FOLLOW-UP (OUTPATIENT)
Dept: PRIMARY CARE CLINIC | Age: 65
End: 2025-03-24

## 2025-03-24 ENCOUNTER — OFFICE VISIT (OUTPATIENT)
Dept: PRIMARY CARE CLINIC | Age: 65
End: 2025-03-24
Payer: MEDICAID

## 2025-03-24 VITALS
DIASTOLIC BLOOD PRESSURE: 79 MMHG | WEIGHT: 249 LBS | HEART RATE: 62 BPM | BODY MASS INDEX: 29.4 KG/M2 | OXYGEN SATURATION: 99 % | SYSTOLIC BLOOD PRESSURE: 131 MMHG | HEIGHT: 77 IN | TEMPERATURE: 98.2 F

## 2025-03-24 DIAGNOSIS — Z00.00 ANNUAL PHYSICAL EXAM: Primary | ICD-10-CM

## 2025-03-24 DIAGNOSIS — Z11.3 ROUTINE SCREENING FOR STI (SEXUALLY TRANSMITTED INFECTION): ICD-10-CM

## 2025-03-24 DIAGNOSIS — Z00.00 ANNUAL PHYSICAL EXAM: ICD-10-CM

## 2025-03-24 DIAGNOSIS — F43.10 PTSD (POST-TRAUMATIC STRESS DISORDER): ICD-10-CM

## 2025-03-24 DIAGNOSIS — E78.00 HYPERCHOLESTEROLEMIA: ICD-10-CM

## 2025-03-24 DIAGNOSIS — T78.40XS ALLERGY, SEQUELA: ICD-10-CM

## 2025-03-24 DIAGNOSIS — Z12.11 SCREENING FOR MALIGNANT NEOPLASM OF COLON: ICD-10-CM

## 2025-03-24 DIAGNOSIS — N52.9 ERECTILE DYSFUNCTION, UNSPECIFIED ERECTILE DYSFUNCTION TYPE: ICD-10-CM

## 2025-03-24 DIAGNOSIS — I10 ESSENTIAL HYPERTENSION: ICD-10-CM

## 2025-03-24 LAB
25(OH)D3 SERPL-MCNC: 12.3 NG/ML
ALBUMIN SERPL-MCNC: 3.7 G/DL (ref 3.4–5)
ALBUMIN/GLOB SERPL: 1.6 {RATIO} (ref 1.1–2.2)
ALP SERPL-CCNC: 69 U/L (ref 40–129)
ALT SERPL-CCNC: 87 U/L (ref 10–40)
ANION GAP SERPL CALCULATED.3IONS-SCNC: 10 MMOL/L (ref 3–16)
AST SERPL-CCNC: 58 U/L (ref 15–37)
BILIRUB SERPL-MCNC: 0.3 MG/DL (ref 0–1)
BUN SERPL-MCNC: 11 MG/DL (ref 7–20)
CALCIUM SERPL-MCNC: 9.4 MG/DL (ref 8.3–10.6)
CHLORIDE SERPL-SCNC: 106 MMOL/L (ref 99–110)
CHOLEST SERPL-MCNC: 189 MG/DL (ref 0–199)
CO2 SERPL-SCNC: 29 MMOL/L (ref 21–32)
CREAT SERPL-MCNC: 0.8 MG/DL (ref 0.8–1.3)
DEPRECATED RDW RBC AUTO: 18.1 % (ref 12.4–15.4)
EST. AVERAGE GLUCOSE BLD GHB EST-MCNC: 108.3 MG/DL
FOLATE SERPL-MCNC: 9.55 NG/ML (ref 4.78–24.2)
GFR SERPLBLD CREATININE-BSD FMLA CKD-EPI: >90 ML/MIN/{1.73_M2}
GLUCOSE SERPL-MCNC: 76 MG/DL (ref 70–99)
HBA1C MFR BLD: 5.4 %
HCT VFR BLD AUTO: 38.6 % (ref 40.5–52.5)
HCV AB SERPL QL IA: NORMAL
HDLC SERPL-MCNC: 96 MG/DL (ref 40–60)
HGB BLD-MCNC: 12.5 G/DL (ref 13.5–17.5)
LDLC SERPL CALC-MCNC: 77 MG/DL
MCH RBC QN AUTO: 30.6 PG (ref 26–34)
MCHC RBC AUTO-ENTMCNC: 32.3 G/DL (ref 31–36)
MCV RBC AUTO: 94.7 FL (ref 80–100)
PLATELET # BLD AUTO: 231 K/UL (ref 135–450)
PMV BLD AUTO: 9.1 FL (ref 5–10.5)
POTASSIUM SERPL-SCNC: 4 MMOL/L (ref 3.5–5.1)
PROT SERPL-MCNC: 6 G/DL (ref 6.4–8.2)
PSA SERPL DL<=0.01 NG/ML-MCNC: 4.32 NG/ML (ref 0–4)
RBC # BLD AUTO: 4.08 M/UL (ref 4.2–5.9)
SODIUM SERPL-SCNC: 145 MMOL/L (ref 136–145)
TRIGL SERPL-MCNC: 81 MG/DL (ref 0–150)
TSH SERPL DL<=0.005 MIU/L-ACNC: 3.54 UIU/ML (ref 0.27–4.2)
VIT B12 SERPL-MCNC: 296 PG/ML (ref 211–911)
VLDLC SERPL CALC-MCNC: 16 MG/DL
WBC # BLD AUTO: 8.2 K/UL (ref 4–11)

## 2025-03-24 PROCEDURE — 3075F SYST BP GE 130 - 139MM HG: CPT | Performed by: STUDENT IN AN ORGANIZED HEALTH CARE EDUCATION/TRAINING PROGRAM

## 2025-03-24 PROCEDURE — 99396 PREV VISIT EST AGE 40-64: CPT | Performed by: STUDENT IN AN ORGANIZED HEALTH CARE EDUCATION/TRAINING PROGRAM

## 2025-03-24 PROCEDURE — 3078F DIAST BP <80 MM HG: CPT | Performed by: STUDENT IN AN ORGANIZED HEALTH CARE EDUCATION/TRAINING PROGRAM

## 2025-03-24 PROCEDURE — 36415 COLL VENOUS BLD VENIPUNCTURE: CPT | Performed by: STUDENT IN AN ORGANIZED HEALTH CARE EDUCATION/TRAINING PROGRAM

## 2025-03-24 RX ORDER — AMITRIPTYLINE HYDROCHLORIDE 10 MG/1
10 TABLET ORAL NIGHTLY
Qty: 90 TABLET | Refills: 1 | Status: SHIPPED | OUTPATIENT
Start: 2025-03-24

## 2025-03-24 RX ORDER — ATORVASTATIN CALCIUM 80 MG/1
80 TABLET, FILM COATED ORAL DAILY
Qty: 90 TABLET | Refills: 1 | Status: SHIPPED | OUTPATIENT
Start: 2025-03-24

## 2025-03-24 RX ORDER — BISOPROLOL FUMARATE AND HYDROCHLOROTHIAZIDE 10; 6.25 MG/1; MG/1
1 TABLET ORAL 2 TIMES DAILY
Qty: 90 TABLET | Refills: 1 | Status: SHIPPED | OUTPATIENT
Start: 2025-03-24

## 2025-03-24 RX ORDER — TADALAFIL 5 MG/1
5 TABLET ORAL DAILY PRN
Qty: 90 TABLET | Refills: 0 | Status: SHIPPED | OUTPATIENT
Start: 2025-03-24

## 2025-03-24 RX ORDER — LORATADINE 10 MG/1
10 TABLET ORAL DAILY
Qty: 90 TABLET | Refills: 1 | Status: SHIPPED | OUTPATIENT
Start: 2025-03-24

## 2025-03-24 ASSESSMENT — PATIENT HEALTH QUESTIONNAIRE - PHQ9
1. LITTLE INTEREST OR PLEASURE IN DOING THINGS: NOT AT ALL
SUM OF ALL RESPONSES TO PHQ QUESTIONS 1-9: 1
SUM OF ALL RESPONSES TO PHQ QUESTIONS 1-9: 1
2. FEELING DOWN, DEPRESSED OR HOPELESS: SEVERAL DAYS
SUM OF ALL RESPONSES TO PHQ QUESTIONS 1-9: 1
SUM OF ALL RESPONSES TO PHQ QUESTIONS 1-9: 1

## 2025-03-24 NOTE — ASSESSMENT & PLAN NOTE
Orders:    bisoprolol-hydroCHLOROthiazide (ZIAC) 10-6.25 MG per tablet; Take 1 tablet by mouth 2 times daily

## 2025-03-24 NOTE — PROGRESS NOTES
Office Note: Annual Physical  3/24/2025  Patient Name: Moshe Jesus Sr.  MRN: [id number] : 1960      SUBJECTIVE:     CHIEF COMPLAINT:  Chief Complaint   Patient presents with    Annual Exam    Migraine     Medication       HISTORY OF PRESENT ILLNESS:  He presents today for an annual physical.    Health Maintenance Due   Topic Date Due    Colorectal Cancer Screen  Never done    COVID-19 Vaccine ( season) 2024    Lipids  2024       Diet: trying to improve  Exercise: trying to improve  Dentist: goes next month  Eye Doctor: goes next month    Tobacco Use: doing better, 1 pack lasts 4 days now   Lung Cancer Screen? Has 10 ppd   AAA Screen? N/a due at 67   Other substance use: quit alcohol about a month ago    Desire for STI screening? Yes     Last colon cancer screening: due    Depression screen: phq-1  Labs Reviewed: yes    Acute Concerns/Follow Ups:    N/a    Past Medical History:  Past Medical History:   Diagnosis Date    Anxiety     Gout     Hyperlipidemia     Hypertension     Knee pain      Past Surgical History:  Past Surgical History:   Procedure Laterality Date    FINGER FRACTURE SURGERY Left     2 MIDDLE FINGERS    KNEE ARTHROSCOPY  right knee    SHOULDER ARTHROSCOPY Right 2020    RIGHT SHOULDER ARTHROSCOPY, SUBACROMIAL DECOMPRESSION, ROTATOR CUFF REPAIR AND BICEPS TENDONISIS performed by Jersey Young MD at [facility] OR     Medications:  Current Outpatient Medications   Medication Sig Dispense Refill    amitriptyline (ELAVIL) 10 MG tablet Take 1 tablet by mouth nightly 90 tablet 1    tadalafil (CIALIS) 5 MG tablet Take 1 tablet by mouth daily as needed for Erectile Dysfunction 90 tablet 0    bisoprolol-hydroCHLOROthiazide (ZIAC) 10-6.25 MG per tablet Take 1 tablet by mouth 2 times daily 90 tablet 1    atorvastatin (LIPITOR) 80 MG tablet Take 1 tablet by mouth daily 90 tablet 1    loratadine (CLARITIN) 10 MG tablet Take 1 tablet by mouth daily 90 tablet 1    meloxicam (MOBIC) 7.5

## 2025-03-24 NOTE — ASSESSMENT & PLAN NOTE
Orders:    tadalafil (CIALIS) 5 MG tablet; Take 1 tablet by mouth daily as needed for Erectile Dysfunction

## 2025-03-25 ENCOUNTER — RESULTS FOLLOW-UP (OUTPATIENT)
Dept: PRIMARY CARE CLINIC | Age: 65
End: 2025-03-25

## 2025-03-25 DIAGNOSIS — R97.20 ELEVATED PSA: Primary | ICD-10-CM

## 2025-03-25 DIAGNOSIS — D50.9 IRON DEFICIENCY ANEMIA, UNSPECIFIED IRON DEFICIENCY ANEMIA TYPE: ICD-10-CM

## 2025-03-25 DIAGNOSIS — E55.9 VITAMIN D DEFICIENCY: ICD-10-CM

## 2025-03-25 LAB
C TRACH DNA UR QL NAA+PROBE: NEGATIVE
HIV 1+2 AB+HIV1 P24 AG SERPL QL IA: NORMAL
HIV 2 AB SERPL QL IA: NORMAL
HIV1 AB SERPL QL IA: NORMAL
HIV1 P24 AG SERPL QL IA: NORMAL
N GONORRHOEA DNA UR QL NAA+PROBE: NEGATIVE
REAGIN+T PALLIDUM IGG+IGM SERPL-IMP: NORMAL

## 2025-03-25 RX ORDER — ERGOCALCIFEROL 1.25 MG/1
50000 CAPSULE, LIQUID FILLED ORAL WEEKLY
Qty: 4 CAPSULE | Refills: 4 | Status: SHIPPED | OUTPATIENT
Start: 2025-03-25

## 2025-06-13 ENCOUNTER — TELEPHONE (OUTPATIENT)
Dept: PRIMARY CARE CLINIC | Age: 65
End: 2025-06-13

## 2025-06-13 NOTE — TELEPHONE ENCOUNTER
Patient need refills on allopurinol          [id number] - [place], [place] - [address] [facility] [address] - P [phone] - F [phone] [address] [place] [id number]  Phone: [phone]  Fax: [phone]

## 2025-08-13 ENCOUNTER — OFFICE VISIT (OUTPATIENT)
Dept: ORTHOPEDIC SURGERY | Age: 65
End: 2025-08-13
Payer: MEDICAID

## 2025-08-13 VITALS — WEIGHT: 249 LBS | BODY MASS INDEX: 29.4 KG/M2 | HEIGHT: 77 IN

## 2025-08-13 DIAGNOSIS — M25.512 LEFT SHOULDER PAIN, UNSPECIFIED CHRONICITY: Primary | ICD-10-CM

## 2025-08-13 PROCEDURE — 99213 OFFICE O/P EST LOW 20 MIN: CPT | Performed by: ORTHOPAEDIC SURGERY

## (undated) DEVICE — SPLINT WRST FA R 7

## (undated) DEVICE — 3M™ STERI-DRAPE™ U-DRAPE 1015: Brand: STERI-DRAPE™

## (undated) DEVICE — 3M™ TEGADERM™ TRANSPARENT FILM DRESSING FRAME STYLE, 1626W, 4 IN X 4-3/4 IN (10 CM X 12 CM), 50/CT 4CT/CASE: Brand: 3M™ TEGADERM™

## (undated) DEVICE — MERCY HEALTH WEST TURNOVER: Brand: MEDLINE INDUSTRIES, INC.

## (undated) DEVICE — MAJOR SET UP PK

## (undated) DEVICE — GLOVE ORANGE PI 7 1/2   MSG9075

## (undated) DEVICE — STRIP,CLOSURE,WOUND,MEDI-STRIP,1/2X4: Brand: MEDLINE

## (undated) DEVICE — TUBING, SUCTION, 1/4" X 12', STRAIGHT: Brand: MEDLINE

## (undated) DEVICE — 3M™ COBAN™ NL STERILE NON-LATEX SELF-ADHERENT WRAP, 2084S, 4 IN X 5 YD (10 CM X 4,5 M), 18 ROLLS/CASE: Brand: 3M™ COBAN™

## (undated) DEVICE — SHEET,DRAPE,53X77,STERILE: Brand: MEDLINE

## (undated) DEVICE — PAD DRY FLOOR ABS 32X58IN GRN

## (undated) DEVICE — DRAPE,U/SHT,SPLIT,FILM,60X84,STERILE: Brand: MEDLINE

## (undated) DEVICE — SYSTEM IMPL L24MM DIA5.5MM BIOCOMP INCL 2 SWIVELOCK SP FOR

## (undated) DEVICE — PACK,SHOULDER,DRAPE,POUCH: Brand: MEDLINE

## (undated) DEVICE — 3M™ IOBAN™ 2 ANTIMICROBIAL INCISE DRAPE 6650EZ: Brand: IOBAN™ 2

## (undated) DEVICE — BLADE SHV L13CM DIA4MM DISECT AGG COOLCUT

## (undated) DEVICE — BUR SHV L13CM DIA4MM 8 FLUT OVL FOR RAP AGG BNE RESECT

## (undated) DEVICE — GOWN SIRUS NONREIN XL W/TWL: Brand: MEDLINE INDUSTRIES, INC.

## (undated) DEVICE — ELECTRODE PT RET AD L9FT HI MOIST COND ADH HYDRGEL CORDED

## (undated) DEVICE — SHOULDER CANNULA SET WITHOUT FENESTRATIONS, 5.5 MM (I.D.) X 70 MM: Brand: CONMED

## (undated) DEVICE — INTENDED FOR TISSUE SEPARATION, AND OTHER PROCEDURES THAT REQUIRE A SHARP SURGICAL BLADE TO PUNCTURE OR CUT.: Brand: BARD-PARKER ® STAINLESS STEEL BLADES

## (undated) DEVICE — APPLICATOR MEDICATED 26 CC SOLUTION HI LT ORNG CHLORAPREP

## (undated) DEVICE — PROBE ABLAT XL 90DEG ASPIR BPLR RF 1 PC ELECTRD ERGO HNDL

## (undated) DEVICE — NEEDLE SPNL L3.5IN PNK HUB S STL REG WALL FIT STYL W/ QNCKE

## (undated) DEVICE — Z INACTIVE USE 2660664 SOLUTION IRRIG 3000ML 0.9% SOD CHL USP UROMATIC PLAS CONT

## (undated) DEVICE — 1010 S-DRAPE TOWEL DRAPE 10/BX: Brand: STERI-DRAPE™

## (undated) DEVICE — SUTURE SUTTAPE FIBERLINK 1.3MM WHT BLU CLS LOOP AR7535

## (undated) DEVICE — GARMENT COMPR L FOR 23IN CALF FLOTRN

## (undated) DEVICE — SUTURE PROL SZ 0 L30IN NONABSORBABLE BLU MO-6 L26MM 1/2 CIR 8418H

## (undated) DEVICE — SUTURE PROL SZ 3-0 L18IN NONABSORBABLE BLU L30MM FS-1 3/8 8663G

## (undated) DEVICE — 4-PORT MANIFOLD: Brand: NEPTUNE 2

## (undated) DEVICE — SPONGE GZ W4XL4IN COT 12 PLY TYP VII WVN C FLD DSGN

## (undated) DEVICE — TUBING PMP L16FT MAIN DISP FOR AR-6400 AR-6475